# Patient Record
Sex: FEMALE | Race: WHITE | NOT HISPANIC OR LATINO | URBAN - METROPOLITAN AREA
[De-identification: names, ages, dates, MRNs, and addresses within clinical notes are randomized per-mention and may not be internally consistent; named-entity substitution may affect disease eponyms.]

---

## 2019-06-25 ENCOUNTER — EMERGENCY (EMERGENCY)
Facility: HOSPITAL | Age: 74
LOS: 1 days | Discharge: HOME | End: 2019-06-25
Attending: EMERGENCY MEDICINE | Admitting: EMERGENCY MEDICINE
Payer: MEDICARE

## 2019-06-25 VITALS
OXYGEN SATURATION: 98 % | SYSTOLIC BLOOD PRESSURE: 148 MMHG | TEMPERATURE: 99 F | HEIGHT: 66 IN | HEART RATE: 104 BPM | DIASTOLIC BLOOD PRESSURE: 90 MMHG | RESPIRATION RATE: 16 BRPM | WEIGHT: 134.92 LBS

## 2019-06-25 VITALS
DIASTOLIC BLOOD PRESSURE: 89 MMHG | HEART RATE: 86 BPM | OXYGEN SATURATION: 96 % | RESPIRATION RATE: 18 BRPM | SYSTOLIC BLOOD PRESSURE: 158 MMHG

## 2019-06-25 DIAGNOSIS — K56.41 FECAL IMPACTION: ICD-10-CM

## 2019-06-25 DIAGNOSIS — K62.89 OTHER SPECIFIED DISEASES OF ANUS AND RECTUM: ICD-10-CM

## 2019-06-25 PROCEDURE — 99283 EMERGENCY DEPT VISIT LOW MDM: CPT

## 2019-06-25 RX ORDER — ONDANSETRON 8 MG/1
8 TABLET, FILM COATED ORAL ONCE
Refills: 0 | Status: COMPLETED | OUTPATIENT
Start: 2019-06-25 | End: 2019-06-25

## 2019-06-25 RX ADMIN — Medication 1 ENEMA: at 20:39

## 2019-06-25 RX ADMIN — ONDANSETRON 8 MILLIGRAM(S): 8 TABLET, FILM COATED ORAL at 22:19

## 2019-06-25 NOTE — ED PROVIDER NOTE - CLINICAL SUMMARY MEDICAL DECISION MAKING FREE TEXT BOX
px with fecal impaction- manually disimpacted in the ED- then had several Large bm's- feeling mild quesy, but improved, stable for d/c home jackie faustin

## 2019-06-25 NOTE — ED PROVIDER NOTE - NSFOLLOWUPINSTRUCTIONS_ED_ALL_ED_FT
Fecal Impaction  Image   A fecal impaction is a large, firm amount of stool (feces) that will not pass out of the body. A fecal impaction usually occurs in the end of the large intestine (rectum). It can block the large intestine and cause significant problems.    What are the causes?  This condition may be caused by anything that slows down bowel movements, including:  Long-term use of medicines that help you have a bowel movement (laxatives).  Constipation.  Pain in the rectum. Fecal impaction can occur if you avoid having bowel movements due to the pain. Pain in the rectum can result from a medical condition, such as hemorrhoids or anal fissures.  Narcotic pain-relieving medicines, such as methadone, morphine, or codeine.  Not drinking enough fluids.  Being inactive for a long period of time.  Diseases of the brain or nervous system that damage nerves that control the muscles of the intestines.  What are the signs or symptoms?  Symptoms of this condition include:  Breathing problems.  Nausea, vomiting, and dehydration.  Dizziness.  Confusion.  Rapid heartbeat.  Fever.  Sweating.  Changes in blood pressure.  Not having a normal number of bowel movements.  Changes in bowel patterns. This may include going to the bathroom less often or not at all.  A sense of fullness in the rectum but being unable to pass stool.  Pain or cramps in the abdominal area. These often happen after meals.  Thin, watery discharge from the rectum.  How is this diagnosed?  This condition may be diagnosed based on your symptoms and an exam of your rectum. Sometimes X-rays or lab tests are done to confirm the diagnosis and to check for other problems.    How is this treated?  This condition may be treated by:  Having your health care provider remove the stool using a gloved finger.  Taking medicine.  A suppository or enema given in the rectum to soften the stool, which can stimulate a bowel movement.  Follow these instructions at home:  Eating and drinking     Image   Drink enough fluid to keep your urine clear or pale yellow.  Include a lot of fiber in your diet. Foods with a lot of fiber include fruits, vegetables, and oatmeal.  If you begin to get constipated, increase the amount of fiber in your diet.  General instructions     Develop bowel habits. An example of a bowel habit is having a bowel movement right after breakfast every day. Be sure to give yourself enough time on the toilet. This may require using enemas, bowel softeners, or suppositories at home, as directed by your health care provider. It may also include using mineral oil or olive oil.  Exercise regularly.  Take over-the-counter and prescription medicines only as told by your health care provider.  Contact a health care provider if:  You have ongoing pain in your rectum.  You need to use an enema or a suppository more than 2 times a week.  You have rectal bleeding.  You continue to have problems. The problems may include not being able to go to the bathroom and long-term (chronic) constipation.  You have pain in your abdomen.  You have thin, pencil-like stools.  Get help right away if:  You have black or tarry stools.  This information is not intended to replace advice given to you by your health care provider. Make sure you discuss any questions you have with your health care provider.    Document Released: 09/09/2005 Document Revised: 07/21/2017 Document Reviewed: 06/22/2017  [employer] Patient Education © 2019 [employer]

## 2019-06-25 NOTE — ED PROVIDER NOTE - OBJECTIVE STATEMENT
73 F co rectal pain- no sig bm x 5-6 days w abd distention  no n/v tolerating po normally no prior abd surgeries 73 F co rectal pain- no sig bm x 5-6 days w abd distention  no n/v tolerating po normally no prior abd surgeries  no abd pain no f/c  was partially disimpacted 2 days ago- no sig bm for 5-6 days  no exac/allev factors

## 2019-06-25 NOTE — ED ADULT NURSE NOTE - OBJECTIVE STATEMENT
Patient presents to the ED with no BM for 5 days.  Denies nausea, vomiting, diarrhea.  Patient states she feels pressure like she could go but she can't. Patient presents to the ED with no BM for 5 days.  Denies nausea, vomiting, diarrhea.  Patient states she feels pressure like she could go but she can't.  patient well appearing, AA&OX3, respirations unlabored, non-diaphoretic, no pallor. abdomen soft, non-tender.

## 2019-06-28 ENCOUNTER — EMERGENCY (EMERGENCY)
Facility: HOSPITAL | Age: 74
LOS: 1 days | Discharge: HOME | End: 2019-06-28
Attending: EMERGENCY MEDICINE | Admitting: EMERGENCY MEDICINE
Payer: MEDICARE

## 2019-06-28 VITALS
TEMPERATURE: 98 F | WEIGHT: 136.69 LBS | HEIGHT: 66 IN | DIASTOLIC BLOOD PRESSURE: 69 MMHG | SYSTOLIC BLOOD PRESSURE: 109 MMHG | HEART RATE: 118 BPM | RESPIRATION RATE: 18 BRPM | OXYGEN SATURATION: 99 %

## 2019-06-28 VITALS
RESPIRATION RATE: 18 BRPM | HEART RATE: 86 BPM | DIASTOLIC BLOOD PRESSURE: 88 MMHG | SYSTOLIC BLOOD PRESSURE: 145 MMHG | TEMPERATURE: 99 F | OXYGEN SATURATION: 99 %

## 2019-06-28 LAB
ALBUMIN SERPL ELPH-MCNC: 4.5 G/DL — SIGNIFICANT CHANGE UP (ref 3.3–5)
ALP SERPL-CCNC: 53 U/L — SIGNIFICANT CHANGE UP (ref 40–120)
ALT FLD-CCNC: 15 U/L — SIGNIFICANT CHANGE UP (ref 10–45)
ANION GAP SERPL CALC-SCNC: 12 MMOL/L — SIGNIFICANT CHANGE UP (ref 5–17)
AST SERPL-CCNC: 22 U/L — SIGNIFICANT CHANGE UP (ref 10–40)
BASOPHILS # BLD AUTO: 0.03 K/UL — SIGNIFICANT CHANGE UP (ref 0–0.2)
BASOPHILS NFR BLD AUTO: 0.4 % — SIGNIFICANT CHANGE UP (ref 0–2)
BILIRUB SERPL-MCNC: 0.4 MG/DL — SIGNIFICANT CHANGE UP (ref 0.2–1.2)
BUN SERPL-MCNC: 23 MG/DL — SIGNIFICANT CHANGE UP (ref 7–23)
CALCIUM SERPL-MCNC: 9.2 MG/DL — SIGNIFICANT CHANGE UP (ref 8.4–10.5)
CHLORIDE SERPL-SCNC: 101 MMOL/L — SIGNIFICANT CHANGE UP (ref 96–108)
CO2 SERPL-SCNC: 26 MMOL/L — SIGNIFICANT CHANGE UP (ref 22–31)
CREAT SERPL-MCNC: 1.02 MG/DL — SIGNIFICANT CHANGE UP (ref 0.5–1.3)
EOSINOPHIL # BLD AUTO: 0.09 K/UL — SIGNIFICANT CHANGE UP (ref 0–0.5)
EOSINOPHIL NFR BLD AUTO: 1.1 % — SIGNIFICANT CHANGE UP (ref 0–6)
GLUCOSE SERPL-MCNC: 110 MG/DL — HIGH (ref 70–99)
HCT VFR BLD CALC: 39.7 % — SIGNIFICANT CHANGE UP (ref 34.5–45)
HGB BLD-MCNC: 12.6 G/DL — SIGNIFICANT CHANGE UP (ref 11.5–15.5)
IMM GRANULOCYTES NFR BLD AUTO: 0.3 % — SIGNIFICANT CHANGE UP (ref 0–1.5)
LYMPHOCYTES # BLD AUTO: 1.98 K/UL — SIGNIFICANT CHANGE UP (ref 1–3.3)
LYMPHOCYTES # BLD AUTO: 25.1 % — SIGNIFICANT CHANGE UP (ref 13–44)
MCHC RBC-ENTMCNC: 27.7 PG — SIGNIFICANT CHANGE UP (ref 27–34)
MCHC RBC-ENTMCNC: 31.7 GM/DL — LOW (ref 32–36)
MCV RBC AUTO: 87.3 FL — SIGNIFICANT CHANGE UP (ref 80–100)
MONOCYTES # BLD AUTO: 0.76 K/UL — SIGNIFICANT CHANGE UP (ref 0–0.9)
MONOCYTES NFR BLD AUTO: 9.6 % — SIGNIFICANT CHANGE UP (ref 2–14)
NEUTROPHILS # BLD AUTO: 5 K/UL — SIGNIFICANT CHANGE UP (ref 1.8–7.4)
NEUTROPHILS NFR BLD AUTO: 63.5 % — SIGNIFICANT CHANGE UP (ref 43–77)
NRBC # BLD: 0 /100 WBCS — SIGNIFICANT CHANGE UP (ref 0–0)
PLATELET # BLD AUTO: 225 K/UL — SIGNIFICANT CHANGE UP (ref 150–400)
POTASSIUM SERPL-MCNC: 4.3 MMOL/L — SIGNIFICANT CHANGE UP (ref 3.5–5.3)
POTASSIUM SERPL-SCNC: 4.3 MMOL/L — SIGNIFICANT CHANGE UP (ref 3.5–5.3)
PROT SERPL-MCNC: 7.7 G/DL — SIGNIFICANT CHANGE UP (ref 6–8.3)
RBC # BLD: 4.55 M/UL — SIGNIFICANT CHANGE UP (ref 3.8–5.2)
RBC # FLD: 14 % — SIGNIFICANT CHANGE UP (ref 10.3–14.5)
SODIUM SERPL-SCNC: 139 MMOL/L — SIGNIFICANT CHANGE UP (ref 135–145)
WBC # BLD: 7.88 K/UL — SIGNIFICANT CHANGE UP (ref 3.8–10.5)
WBC # FLD AUTO: 7.88 K/UL — SIGNIFICANT CHANGE UP (ref 3.8–10.5)

## 2019-06-28 PROCEDURE — 74019 RADEX ABDOMEN 2 VIEWS: CPT | Mod: 26

## 2019-06-28 PROCEDURE — 99284 EMERGENCY DEPT VISIT MOD MDM: CPT

## 2019-06-28 NOTE — ED PROVIDER NOTE - SKIN NEGATIVE STATEMENT, MLM
no abrasions, no jaundice, no lesions, no pruritis, and no rashes.
Patient/Caregiver provided printed discharge information.

## 2019-06-28 NOTE — ED PROVIDER NOTE - NSFOLLOWUPINSTRUCTIONS_ED_ALL_ED_FT
Please follow up with your primary physician in 1-2 days for re evaluation.  Please return to ER immediately should your symptoms worsen or if you have any concern prior to this recommended follow up.          CONSTIPATION - AfterCare(R) Instructions(ER/ED)     Constipation    WHAT YOU NEED TO KNOW:    Constipation is when you have hard, dry bowel movements, or you go longer than usual between bowel movements.     DISCHARGE INSTRUCTIONS:    Call your doctor if:     You have blood in your bowel movements.      You have a fever and abdominal pain with the constipation.      Your constipation gets worse.       You start to vomit.      You have questions or concerns about your condition or care.    Medicines:     Medicine such as a laxative may help relax and loosen your intestines to help you have a bowel movement. Your provider may recommend you only use laxatives for a short time. Long-term use may make your bowels dependent on the medicine.      Take your medicine as directed. Contact your healthcare provider if you think your medicine is not helping or if you have side effects. Tell him of her if you are allergic to any medicine. Keep a list of the medicines, vitamins, and herbs you take. Include the amounts, and when and why you take them. Bring the list or the pill bottles to follow-up visits. Carry your medicine list with you in case of an emergency.    Relieve constipation:     A suppository may be used to help soften your bowel movements. This may make them easier to pass. A suppository is guided into your rectum through your anus.Suppository for Constipation           An enema is liquid medicine used to clear bowel movement from your rectum. The medicine is put into your rectum through your anus.Enemas         Prevent constipation:     Drink liquids as directed. You may need to drink extra liquids to help soften and move your bowels. Ask how much liquid to drink each day and which liquids are best for you.       Eat high-fiber foods. This may help decrease constipation by adding bulk to your bowel movements. High-fiber foods include fruit, vegetables, whole-grain breads and cereals, and beans. Your healthcare provider or dietitian can help you create a high-fiber meal plan. Your provider may also recommend a fiber supplement if you cannot get enough fiber from food.            Exercise regularly. Regular physical activity can help stimulate your intestines. Walking is a good exercise to prevent or relieve constipation. Ask which exercises are best for you.Walking for Exercise           Schedule a time each day to have a bowel movement. This may help train your body to have regular bowel movements. Bend forward while you are on the toilet to help move the bowel movement out. Sit on the toilet for at least 10 minutes, even if you do not have a bowel movement.       Talk to your healthcare provider about your medicines. Certain medicines, such as opioids, can cause constipation. Your provider may be able to make medicine changes. For example, he or she may change the kind of medicine, or change when you take it.    Follow up with your healthcare provider as directed: Write down your questions so you remember to ask them during your visits.        © Copyright [employer] 2019 All illustrations and images included in CareNotes are the copyrighted property of [employer] or [employer].      back to top                      © Copyright [employer] 2019

## 2019-06-28 NOTE — ED PROVIDER NOTE - OBJECTIVE STATEMENT
73 year old female presents to ED with concern for rectal pain and constipation, ongoing.  Patient notes she had a large bowel movements on her way to ED and is now feeling improved.  She had taken any enema prior to arrival in ED today.  She notes recent ED and PCP eval with disimpaction, though was still feeling constipated this morning.  Patient denies associated headache, fever, chills, nausea, emesis, or additional acute complaints or concerns at this time.

## 2019-06-28 NOTE — ED PROVIDER NOTE - CLINICAL SUMMARY MEDICAL DECISION MAKING FREE TEXT BOX
Patient in ED w concern for constipation over the past week - seen in ED and by her PCP with subsequent disimpactions.  Patient notes she was feeling constipated again today despite digital disimpaction by her PCP yesterday, so she took an enema and decided to come to ED for evaluation.  Patient notes en route to ED she had a large BM and is feeling improvement, though still wanted to be evaluated.  Patient is now abdominal pain free and well apearing.  Benign physical exam without reproducible abdominal discomfort.  Labs and imaging reviewed.  Several air fluid levels noted on abd x ray.  I spoke with radiologist in reading room who does not note dilated loops of bowel and is not worried for obstruction.  Given patient's benign exam and improved symptoms following BM, will plan for discharge and advise continued use of stool softener and prompt PCP follow up in 1-2 days for re evaluation.  Patient is instructed to return to ED immediately should symptoms worsen or if there is any concern prior to this recommended follow up.  Patient is aware of plan and verbalizes her understanding.  Will discharge home at this time.

## 2019-06-28 NOTE — ED ADULT NURSE NOTE - OBJECTIVE STATEMENT
The pt is a 74 y/o female who came in to ED for evaluation of rectal pain. Pt reports that she was here in ED two days ago and she was manually disimpacted. Pt reports taking an enema and that is "working now" Pt primary complaint is rectal pain and pressure.

## 2019-06-30 ENCOUNTER — INPATIENT (INPATIENT)
Facility: HOSPITAL | Age: 74
LOS: 1 days | Discharge: HOME | DRG: 389 | End: 2019-07-02
Attending: SPECIALIST | Admitting: SPECIALIST
Payer: MEDICARE

## 2019-06-30 VITALS
DIASTOLIC BLOOD PRESSURE: 98 MMHG | HEART RATE: 92 BPM | WEIGHT: 134.92 LBS | SYSTOLIC BLOOD PRESSURE: 159 MMHG | TEMPERATURE: 98 F | HEIGHT: 66 IN | OXYGEN SATURATION: 98 % | RESPIRATION RATE: 17 BRPM

## 2019-06-30 DIAGNOSIS — R63.8 OTHER SYMPTOMS AND SIGNS CONCERNING FOOD AND FLUID INTAKE: ICD-10-CM

## 2019-06-30 DIAGNOSIS — G20 PARKINSON'S DISEASE: ICD-10-CM

## 2019-06-30 DIAGNOSIS — K52.9 NONINFECTIVE GASTROENTERITIS AND COLITIS, UNSPECIFIED: ICD-10-CM

## 2019-06-30 DIAGNOSIS — K56.41 FECAL IMPACTION: ICD-10-CM

## 2019-06-30 DIAGNOSIS — R33.9 RETENTION OF URINE, UNSPECIFIED: ICD-10-CM

## 2019-06-30 DIAGNOSIS — Z29.9 ENCOUNTER FOR PROPHYLACTIC MEASURES, UNSPECIFIED: ICD-10-CM

## 2019-06-30 DIAGNOSIS — I10 ESSENTIAL (PRIMARY) HYPERTENSION: ICD-10-CM

## 2019-06-30 DIAGNOSIS — E78.5 HYPERLIPIDEMIA, UNSPECIFIED: ICD-10-CM

## 2019-06-30 DIAGNOSIS — Z91.89 OTHER SPECIFIED PERSONAL RISK FACTORS, NOT ELSEWHERE CLASSIFIED: ICD-10-CM

## 2019-06-30 LAB
ALBUMIN SERPL ELPH-MCNC: 4.2 G/DL — SIGNIFICANT CHANGE UP (ref 3.3–5)
ALP SERPL-CCNC: 56 U/L — SIGNIFICANT CHANGE UP (ref 40–120)
ALT FLD-CCNC: 14 U/L — SIGNIFICANT CHANGE UP (ref 10–45)
ANION GAP SERPL CALC-SCNC: 12 MMOL/L — SIGNIFICANT CHANGE UP (ref 5–17)
APPEARANCE UR: CLEAR — SIGNIFICANT CHANGE UP
AST SERPL-CCNC: 20 U/L — SIGNIFICANT CHANGE UP (ref 10–40)
BASOPHILS # BLD AUTO: 0.03 K/UL — SIGNIFICANT CHANGE UP (ref 0–0.2)
BASOPHILS NFR BLD AUTO: 0.4 % — SIGNIFICANT CHANGE UP (ref 0–2)
BILIRUB SERPL-MCNC: 0.5 MG/DL — SIGNIFICANT CHANGE UP (ref 0.2–1.2)
BILIRUB UR-MCNC: NEGATIVE — SIGNIFICANT CHANGE UP
BLD GP AB SCN SERPL QL: NEGATIVE — SIGNIFICANT CHANGE UP
BUN SERPL-MCNC: 22 MG/DL — SIGNIFICANT CHANGE UP (ref 7–23)
CALCIUM SERPL-MCNC: 9.5 MG/DL — SIGNIFICANT CHANGE UP (ref 8.4–10.5)
CHLORIDE SERPL-SCNC: 103 MMOL/L — SIGNIFICANT CHANGE UP (ref 96–108)
CO2 SERPL-SCNC: 25 MMOL/L — SIGNIFICANT CHANGE UP (ref 22–31)
COLOR SPEC: YELLOW — SIGNIFICANT CHANGE UP
CREAT SERPL-MCNC: 0.93 MG/DL — SIGNIFICANT CHANGE UP (ref 0.5–1.3)
DIFF PNL FLD: ABNORMAL
EOSINOPHIL # BLD AUTO: 0.06 K/UL — SIGNIFICANT CHANGE UP (ref 0–0.5)
EOSINOPHIL NFR BLD AUTO: 0.8 % — SIGNIFICANT CHANGE UP (ref 0–6)
GLUCOSE SERPL-MCNC: 105 MG/DL — HIGH (ref 70–99)
GLUCOSE UR QL: NEGATIVE — SIGNIFICANT CHANGE UP
HCT VFR BLD CALC: 41.2 % — SIGNIFICANT CHANGE UP (ref 34.5–45)
HGB BLD-MCNC: 13.2 G/DL — SIGNIFICANT CHANGE UP (ref 11.5–15.5)
IMM GRANULOCYTES NFR BLD AUTO: 0.3 % — SIGNIFICANT CHANGE UP (ref 0–1.5)
INR BLD: 1.15 — SIGNIFICANT CHANGE UP (ref 0.88–1.16)
KETONES UR-MCNC: 15 MG/DL
LACTATE SERPL-SCNC: 0.9 MMOL/L — SIGNIFICANT CHANGE UP (ref 0.5–2)
LEUKOCYTE ESTERASE UR-ACNC: NEGATIVE — SIGNIFICANT CHANGE UP
LYMPHOCYTES # BLD AUTO: 2.37 K/UL — SIGNIFICANT CHANGE UP (ref 1–3.3)
LYMPHOCYTES # BLD AUTO: 32.4 % — SIGNIFICANT CHANGE UP (ref 13–44)
MCHC RBC-ENTMCNC: 28.1 PG — SIGNIFICANT CHANGE UP (ref 27–34)
MCHC RBC-ENTMCNC: 32 GM/DL — SIGNIFICANT CHANGE UP (ref 32–36)
MCV RBC AUTO: 87.8 FL — SIGNIFICANT CHANGE UP (ref 80–100)
MONOCYTES # BLD AUTO: 0.7 K/UL — SIGNIFICANT CHANGE UP (ref 0–0.9)
MONOCYTES NFR BLD AUTO: 9.6 % — SIGNIFICANT CHANGE UP (ref 2–14)
NEUTROPHILS # BLD AUTO: 4.14 K/UL — SIGNIFICANT CHANGE UP (ref 1.8–7.4)
NEUTROPHILS NFR BLD AUTO: 56.5 % — SIGNIFICANT CHANGE UP (ref 43–77)
NITRITE UR-MCNC: NEGATIVE — SIGNIFICANT CHANGE UP
NRBC # BLD: 0 /100 WBCS — SIGNIFICANT CHANGE UP (ref 0–0)
PH UR: 6 — SIGNIFICANT CHANGE UP (ref 5–8)
PLATELET # BLD AUTO: 239 K/UL — SIGNIFICANT CHANGE UP (ref 150–400)
POTASSIUM SERPL-MCNC: 4.8 MMOL/L — SIGNIFICANT CHANGE UP (ref 3.5–5.3)
POTASSIUM SERPL-SCNC: 4.8 MMOL/L — SIGNIFICANT CHANGE UP (ref 3.5–5.3)
PROT SERPL-MCNC: 7.7 G/DL — SIGNIFICANT CHANGE UP (ref 6–8.3)
PROT UR-MCNC: NEGATIVE MG/DL — SIGNIFICANT CHANGE UP
PROTHROM AB SERPL-ACNC: 13.1 SEC — HIGH (ref 10–12.9)
RBC # BLD: 4.69 M/UL — SIGNIFICANT CHANGE UP (ref 3.8–5.2)
RBC # FLD: 13.7 % — SIGNIFICANT CHANGE UP (ref 10.3–14.5)
RH IG SCN BLD-IMP: POSITIVE — SIGNIFICANT CHANGE UP
SODIUM SERPL-SCNC: 140 MMOL/L — SIGNIFICANT CHANGE UP (ref 135–145)
SP GR SPEC: <=1.005 — SIGNIFICANT CHANGE UP (ref 1–1.03)
TSH SERPL-MCNC: 1.39 UIU/ML — SIGNIFICANT CHANGE UP (ref 0.35–4.94)
UROBILINOGEN FLD QL: 0.2 E.U./DL — SIGNIFICANT CHANGE UP
WBC # BLD: 7.32 K/UL — SIGNIFICANT CHANGE UP (ref 3.8–10.5)
WBC # FLD AUTO: 7.32 K/UL — SIGNIFICANT CHANGE UP (ref 3.8–10.5)

## 2019-06-30 PROCEDURE — 74177 CT ABD & PELVIS W/CONTRAST: CPT | Mod: 26

## 2019-06-30 PROCEDURE — 93010 ELECTROCARDIOGRAM REPORT: CPT | Mod: 76

## 2019-06-30 PROCEDURE — 99285 EMERGENCY DEPT VISIT HI MDM: CPT

## 2019-06-30 RX ORDER — ONDANSETRON 8 MG/1
4 TABLET, FILM COATED ORAL ONCE
Refills: 0 | Status: COMPLETED | OUTPATIENT
Start: 2019-06-30 | End: 2019-06-30

## 2019-06-30 RX ORDER — IOHEXOL 300 MG/ML
30 INJECTION, SOLUTION INTRAVENOUS ONCE
Refills: 0 | Status: COMPLETED | OUTPATIENT
Start: 2019-06-30 | End: 2019-06-30

## 2019-06-30 RX ORDER — SOD SULF/SODIUM/NAHCO3/KCL/PEG
4000 SOLUTION, RECONSTITUTED, ORAL ORAL ONCE
Refills: 0 | Status: DISCONTINUED | OUTPATIENT
Start: 2019-06-30 | End: 2019-06-30

## 2019-06-30 RX ORDER — AMLODIPINE BESYLATE 2.5 MG/1
2.5 TABLET ORAL ONCE
Refills: 0 | Status: DISCONTINUED | OUTPATIENT
Start: 2019-06-30 | End: 2019-06-30

## 2019-06-30 RX ORDER — SELEGILINE HYDROCHLORIDE 1.25 MG/1
5 TABLET, ORALLY DISINTEGRATING ORAL
Refills: 0 | Status: DISCONTINUED | OUTPATIENT
Start: 2019-06-30 | End: 2019-07-01

## 2019-06-30 RX ORDER — SODIUM CHLORIDE 9 MG/ML
1000 INJECTION INTRAMUSCULAR; INTRAVENOUS; SUBCUTANEOUS ONCE
Refills: 0 | Status: COMPLETED | OUTPATIENT
Start: 2019-06-30 | End: 2019-06-30

## 2019-06-30 RX ORDER — LACTULOSE 10 G/15ML
10 SOLUTION ORAL DAILY
Refills: 0 | Status: DISCONTINUED | OUTPATIENT
Start: 2019-06-30 | End: 2019-07-02

## 2019-06-30 RX ORDER — LISINOPRIL 2.5 MG/1
2.5 TABLET ORAL ONCE
Refills: 0 | Status: COMPLETED | OUTPATIENT
Start: 2019-06-30 | End: 2019-06-30

## 2019-06-30 RX ORDER — ATORVASTATIN CALCIUM 80 MG/1
10 TABLET, FILM COATED ORAL DAILY
Refills: 0 | Status: DISCONTINUED | OUTPATIENT
Start: 2019-06-30 | End: 2019-06-30

## 2019-06-30 RX ORDER — SOD SULF/SODIUM/NAHCO3/KCL/PEG
1000 SOLUTION, RECONSTITUTED, ORAL ORAL ONCE
Refills: 0 | Status: COMPLETED | OUTPATIENT
Start: 2019-06-30 | End: 2019-06-30

## 2019-06-30 RX ORDER — ATORVASTATIN CALCIUM 80 MG/1
10 TABLET, FILM COATED ORAL AT BEDTIME
Refills: 0 | Status: DISCONTINUED | OUTPATIENT
Start: 2019-06-30 | End: 2019-07-01

## 2019-06-30 RX ORDER — SELEGILINE HYDROCHLORIDE 1.25 MG/1
5 TABLET, ORALLY DISINTEGRATING ORAL
Refills: 0 | Status: DISCONTINUED | OUTPATIENT
Start: 2019-06-30 | End: 2019-06-30

## 2019-06-30 RX ORDER — LIDOCAINE 4 G/100G
1 CREAM TOPICAL ONCE
Refills: 0 | Status: COMPLETED | OUTPATIENT
Start: 2019-06-30 | End: 2019-06-30

## 2019-06-30 RX ADMIN — Medication 1000 MILLILITER(S): at 23:00

## 2019-06-30 RX ADMIN — ONDANSETRON 4 MILLIGRAM(S): 8 TABLET, FILM COATED ORAL at 11:17

## 2019-06-30 RX ADMIN — Medication 10 MILLIGRAM(S): at 19:06

## 2019-06-30 RX ADMIN — IOHEXOL 30 MILLILITER(S): 300 INJECTION, SOLUTION INTRAVENOUS at 11:00

## 2019-06-30 RX ADMIN — LIDOCAINE 1 APPLICATION(S): 4 CREAM TOPICAL at 13:51

## 2019-06-30 RX ADMIN — LISINOPRIL 2.5 MILLIGRAM(S): 2.5 TABLET ORAL at 19:05

## 2019-06-30 RX ADMIN — ONDANSETRON 4 MILLIGRAM(S): 8 TABLET, FILM COATED ORAL at 23:00

## 2019-06-30 RX ADMIN — SODIUM CHLORIDE 1000 MILLILITER(S): 9 INJECTION INTRAMUSCULAR; INTRAVENOUS; SUBCUTANEOUS at 11:15

## 2019-06-30 NOTE — ED PROVIDER NOTE - PHYSICAL EXAMINATION
CONSTITUTIONAL: WD,WN. NAD.    SKIN: Normal color and turgor. No rash.    HEAD: NC/AT.  EYES: Conjunctiva clear. EOMI. PERRL.    ENT: Airway patent, OP without erythema, tonsillar swelling or exudate; uvula midline without swelling. Nasal mucosa clear, no rhinorrhea.   RESPIRATORY:  Breathing non-labored. No retractions or accessory muscle use.  Lungs CTA bilat.  CARDIOVASCULAR:  RRR, S1S2. No M/R/G.      GI:  Abdomen moderately distended.  BS present, hypoactive.  No signficant tenderness.  Rectal: + fecal impaction, no visible blood.   MSK: Neck supple with painless ROM.  No lower extremity edema or calf tenderness.  No joint swelling or ROM limitation.  NEURO: Alert and oriented; CN II-XII grossly intact. Speech clear. 5/5 strength in all extremities.  Normal balance and gait.

## 2019-06-30 NOTE — ED PROVIDER NOTE - ATTENDING CONTRIBUTION TO CARE
72 yo f w hx of ongoing constipation presents to ED with concern for constipation.  She notes multiple recent evaluations and 2 subsequent disimpactions.  She has been taking her bowel regimen and despite this is not feeling improved.  She called Dr. Santacruz today and was instructed to come to ED for eval.  On my face to face ED eval, patient is non toxic in appearance.  HRRR, lungs clear.  Abd soft.  Will check CT and dispo accordingly.

## 2019-06-30 NOTE — H&P ADULT - PROBLEM SELECTOR PLAN 4
-Was given lisinopril 2.5mg PRN for  -Was given lisinopril 2.5mg one time dose /90  -Continue to monitor BP with scheduled vitals

## 2019-06-30 NOTE — ED ADULT TRIAGE NOTE - CHIEF COMPLAINT QUOTE
Patient complaining of constipation and rectal pain.  Patient states several visits this week to [facility] this week for same symptoms.  Patient states LBM was "a tiny piece yesterday."  Patient denies any CP, SOB, N/V or any other complaints at this time. Patient complaining of constipation and rectal pain.  Patient states several visits this week to [facility] for same symptoms.  Patient states LBM was "a tiny piece yesterday."  Patient denies any CP, SOB, N/V or any other complaints at this time.

## 2019-06-30 NOTE — ED PROVIDER NOTE - NS ED ROS FT
CONSTITUTIONAL: No fever, chills, or weakness  NEURO: No headache, no dizziness, no syncope; No focal weakness/tingling/numbness  EYES: No visual changes  ENT: No rhinorrhea or sore throat  PULM: No cough or dyspnea  CV: No chest pain or palpitations  GI: HPI  : HPI  MSK: No neck pain or back pain, no joint pain  SKIN: no rash or unusual bruising

## 2019-06-30 NOTE — H&P ADULT - NSHPSOCIALHISTORY_GEN_ALL_CORE
occasional etoh use  denies tobacco use  denies recreational drug use  lives in apartment by herself

## 2019-06-30 NOTE — ED PROVIDER NOTE - OBJECTIVE STATEMENT
72 yo fem PMHx parkinson's disease c/o rectal pain and abdominal bloating.  Constipated for over a week taking Miralax, colace, prune juice; has seen her PCP with digital rectal disimpaction in office, and had 2 ED visits in the past week before coming to ED today.  She had temporary relief after a second manual disimpaction in the ED a few days ago.  She spoke with Dr Santacruz covering for Dr Farrell, recommended return to ED for re-evaluation and CT scan.  No vomiting, fever/chills.  + urinary retention.  Last colonoscopy 2 yrs ago.

## 2019-06-30 NOTE — ED PROVIDER NOTE - PROGRESS NOTE DETAILS
d/w Dr Santacruz: he will admit for stercoral colitis; recommends trial of GoLytely, Linzess, or Cytotec.  Will have surgery consult as well.

## 2019-06-30 NOTE — H&P ADULT - PROBLEM SELECTOR PLAN 9
1) PCP Contacted on Admission: Dr. Robinson BARAHONA  2) Date of Contact with PCP:  3) PCP Contacted at Discharge:   4) Summary of Handoff Given to PCP:   5) Post-Discharge Appointment Date and Location:

## 2019-06-30 NOTE — H&P ADULT - PROBLEM SELECTOR PLAN 1
-generalized abdominal distension, active bowel sounds, non peritonitic, hemodynamically stable  -WBC 7.3, Hgb 13.2, lactate wnl  -CT A/P w oral and IV contrast noted, c/w stercoral colitis and severely distended urinary bladder  Plan:  -CBC, Coags, Type and Screen, Blood cx  -No surgical consultation at this time  -No empiric abx at this time  -NPO  -Serial abdominal exams  -Bowel regimen to include Genesis, Lactulose, Serial Enema -generalized abdominal distension, active bowel sounds, non peritonitic, hemodynamically stable  -WBC 7.3, Hgb 13.2, lactate wnl  -CT A/P w oral and IV contrast noted, c/w stercoral colitis and severely distended urinary bladder  Plan:  -CBC, Coags, Type and Screen, Blood cx  -AM labs (BMP, CBC, Mag)  -No surgical consultation at this time  -No empiric abx at this time  -NPO  -Serial abdominal exams  -Bowel regimen to include Genesis, Lactulose, Serial Enema -generalized abdominal distension, active bowel sounds, non peritonitic, hemodynamically stable  -WBC 7.3, Hgb 13.2, lactate wnl  -CT A/P w oral and IV contrast noted, c/w stercoral colitis and severely distended urinary bladder  Plan:  -CBC, Coags, Type and Screen, Blood cx  -AM labs (BMP, CBC, Mag)  -No surgical consultation at this time  -No empiric abx at this time  -NPO  -Zofran for nausea  -Bowel regimen to include Genesis, Lactulose, Serial Enema

## 2019-06-30 NOTE — H&P ADULT - PROBLEM SELECTOR PLAN 3
-STAT straight cath  -Bladder scan q6H and if PVR >300, consider straight cath again -Bladder scan showing PVR >900ml  -Will place silva and do trial of void tomorrow AM

## 2019-06-30 NOTE — H&P ADULT - NSHPPHYSICALEXAM_GEN_ALL_CORE
.  VITAL SIGNS:  T(C): 36.7 (06-30-19 @ 17:43), Max: 37.6 (06-30-19 @ 12:52)  T(F): 98 (06-30-19 @ 17:43), Max: 99.6 (06-30-19 @ 12:52)  HR: 89 (06-30-19 @ 17:43) (84 - 92)  BP: 160/96 (06-30-19 @ 17:43) (157/89 - 167/98)  BP(mean): --  RR: 16 (06-30-19 @ 17:43) (16 - 18)  SpO2: 99% (06-30-19 @ 17:43) (98% - 99%)  Wt(kg): --    PHYSICAL EXAM:    Constitutional: WDWN resting comfortably in bed; NAD  Head: NC/AT  Eyes: PERRL, EOMI  ENT: MMM  Neck: supple; no JVD or thyromegaly  Respiratory: CTA B/L; no W/R/R, no retractions  Cardiac: +S1/S2; RRR; no M/R/G  Gastrointestinal: moderate abdominal distension and slight discomfort to palpation in suprapubic region; no rebound, no guarding, normal bowel sounds  Back: no CVAT B/L  Extremities: WWP, no clubbing or cyanosis; no peripheral edema  Musculoskeletal: NROM x4; no joint swelling, tenderness or erythema  Vascular: 2+ radial, femoral, DP/PT pulses B/L  Dermatologic: skin warm, dry and intact; no rashes, wounds, or scars  Lymphatic: no submandibular or cervical LAD  Neurologic: AAOx3; CNII-XII grossly intact; no focal deficits  Psychiatric: affect and characteristics of appearance, verbalizations, behaviors are appropriate

## 2019-06-30 NOTE — H&P ADULT - PROBLEM SELECTOR PLAN 6
F: tolerating PO, no IVF  E: replete K<4, Mg<2  N: Dash/TLC    VTE Prophylaxis: Lovenox SubQ  C: Full Code  D: RMF c/w home lipitor

## 2019-06-30 NOTE — ED ADULT NURSE NOTE - OBJECTIVE STATEMENT
pt to ER w/ report of constipation and rectal pain.  Pt denies cp/sob/f/c/n/v.  Breathing unlabored, skin warm and dry. IV access established, labs drawn and sent. Will continue to monitor.

## 2019-06-30 NOTE — ED PROVIDER NOTE - CLINICAL SUMMARY MEDICAL DECISION MAKING FREE TEXT BOX
Patient with constipation not relieved by bowel regimen at home and multiple digital disimpactions, and associated urinary retention.  Third digital disimpaction done in ED, given tap water enema, and CT scan with oral contrast done without success. CT scan showing stercoral colitis, will admit.

## 2019-06-30 NOTE — ED ADULT NURSE NOTE - CHIEF COMPLAINT QUOTE
Patient complaining of constipation and rectal pain.  Patient states several visits this week to [facility] for same symptoms.  Patient states LBM was "a tiny piece yesterday."  Patient denies any CP, SOB, N/V or any other complaints at this time.

## 2019-06-30 NOTE — H&P ADULT - NSHPLABSRESULTS_GEN_ALL_CORE
13.2   7.32  )-----------( 239      ( 30 Jun 2019 11:13 )             41.2       LIVER FUNCTIONS - ( 30 Jun 2019 11:13 )  Alb: 4.2 g/dL / Pro: 7.7 g/dL / ALK PHOS: 56 U/L / ALT: 14 U/L / AST: 20 U/L / GGT: x             All imaging reviewed. .  LABS:                         13.2   7.32  )-----------( 239      ( 30 Jun 2019 11:13 )             41.2     06-30    140  |  103  |  22  ----------------------------<  105<H>  4.8   |  25  |  0.93    Ca    9.5      30 Jun 2019 11:13    TPro  7.7  /  Alb  4.2  /  TBili  0.5  /  DBili  x   /  AST  20  /  ALT  14  /  AlkPhos  56  06-30      Urinalysis Basic - ( 30 Jun 2019 15:52 )    Color: Yellow / Appearance: Clear / SG: <=1.005 / pH: x  Gluc: x / Ketone: 15 mg/dL  / Bili: Negative / Urobili: 0.2 E.U./dL   Blood: x / Protein: NEGATIVE mg/dL / Nitrite: NEGATIVE   Leuk Esterase: NEGATIVE / RBC: < 5 /HPF / WBC < 5 /HPF   Sq Epi: x / Non Sq Epi: 0-5 /HPF / Bacteria: Present /HPF            Lactate, Blood: 0.9 mmoL/L (06-30 @ 18:17)      RADIOLOGY, EKG & ADDITIONAL TESTS: Reviewed.

## 2019-07-01 LAB
ANION GAP SERPL CALC-SCNC: 11 MMOL/L — SIGNIFICANT CHANGE UP (ref 5–17)
BUN SERPL-MCNC: 19 MG/DL — SIGNIFICANT CHANGE UP (ref 7–23)
CALCIUM SERPL-MCNC: 8.6 MG/DL — SIGNIFICANT CHANGE UP (ref 8.4–10.5)
CHLORIDE SERPL-SCNC: 101 MMOL/L — SIGNIFICANT CHANGE UP (ref 96–108)
CO2 SERPL-SCNC: 25 MMOL/L — SIGNIFICANT CHANGE UP (ref 22–31)
CREAT SERPL-MCNC: 0.94 MG/DL — SIGNIFICANT CHANGE UP (ref 0.5–1.3)
GLUCOSE SERPL-MCNC: 128 MG/DL — HIGH (ref 70–99)
HCT VFR BLD CALC: 35.9 % — SIGNIFICANT CHANGE UP (ref 34.5–45)
HCV AB S/CO SERPL IA: 0.17 S/CO — SIGNIFICANT CHANGE UP
HCV AB SERPL-IMP: SIGNIFICANT CHANGE UP
HGB BLD-MCNC: 11.7 G/DL — SIGNIFICANT CHANGE UP (ref 11.5–15.5)
MAGNESIUM SERPL-MCNC: 2 MG/DL — SIGNIFICANT CHANGE UP (ref 1.6–2.6)
MCHC RBC-ENTMCNC: 27.9 PG — SIGNIFICANT CHANGE UP (ref 27–34)
MCHC RBC-ENTMCNC: 32.6 GM/DL — SIGNIFICANT CHANGE UP (ref 32–36)
MCV RBC AUTO: 85.5 FL — SIGNIFICANT CHANGE UP (ref 80–100)
NRBC # BLD: 0 /100 WBCS — SIGNIFICANT CHANGE UP (ref 0–0)
PLATELET # BLD AUTO: 225 K/UL — SIGNIFICANT CHANGE UP (ref 150–400)
POTASSIUM SERPL-MCNC: 3.8 MMOL/L — SIGNIFICANT CHANGE UP (ref 3.5–5.3)
POTASSIUM SERPL-SCNC: 3.8 MMOL/L — SIGNIFICANT CHANGE UP (ref 3.5–5.3)
RBC # BLD: 4.2 M/UL — SIGNIFICANT CHANGE UP (ref 3.8–5.2)
RBC # FLD: 13.6 % — SIGNIFICANT CHANGE UP (ref 10.3–14.5)
SODIUM SERPL-SCNC: 137 MMOL/L — SIGNIFICANT CHANGE UP (ref 135–145)
WBC # BLD: 7.94 K/UL — SIGNIFICANT CHANGE UP (ref 3.8–10.5)
WBC # FLD AUTO: 7.94 K/UL — SIGNIFICANT CHANGE UP (ref 3.8–10.5)

## 2019-07-01 RX ORDER — MINERAL OIL
133 OIL (ML) MISCELLANEOUS ONCE
Refills: 0 | Status: COMPLETED | OUTPATIENT
Start: 2019-07-01 | End: 2019-07-01

## 2019-07-01 RX ORDER — SELEGILINE HYDROCHLORIDE 1.25 MG/1
10 TABLET, ORALLY DISINTEGRATING ORAL
Refills: 0 | Status: DISCONTINUED | OUTPATIENT
Start: 2019-07-02 | End: 2019-07-02

## 2019-07-01 RX ORDER — ATORVASTATIN CALCIUM 80 MG/1
1 TABLET, FILM COATED ORAL
Qty: 0 | Refills: 0 | DISCHARGE

## 2019-07-01 RX ORDER — IBUPROFEN 200 MG
200 TABLET ORAL ONCE
Refills: 0 | Status: COMPLETED | OUTPATIENT
Start: 2019-07-01 | End: 2019-07-01

## 2019-07-01 RX ORDER — SELEGILINE HYDROCHLORIDE 1.25 MG/1
10 TABLET, ORALLY DISINTEGRATING ORAL ONCE
Refills: 0 | Status: COMPLETED | OUTPATIENT
Start: 2019-07-01 | End: 2019-07-01

## 2019-07-01 RX ORDER — ATORVASTATIN CALCIUM 80 MG/1
10 TABLET, FILM COATED ORAL DAILY
Refills: 0 | Status: DISCONTINUED | OUTPATIENT
Start: 2019-07-01 | End: 2019-07-02

## 2019-07-01 RX ORDER — LIDOCAINE 4 G/100G
1 CREAM TOPICAL ONCE
Refills: 0 | Status: COMPLETED | OUTPATIENT
Start: 2019-07-01 | End: 2019-07-01

## 2019-07-01 RX ORDER — SELEGILINE HYDROCHLORIDE 1.25 MG/1
1 TABLET, ORALLY DISINTEGRATING ORAL
Qty: 0 | Refills: 0 | DISCHARGE

## 2019-07-01 RX ORDER — ONDANSETRON 8 MG/1
4 TABLET, FILM COATED ORAL ONCE
Refills: 0 | Status: COMPLETED | OUTPATIENT
Start: 2019-07-01 | End: 2019-07-01

## 2019-07-01 RX ADMIN — ONDANSETRON 4 MILLIGRAM(S): 8 TABLET, FILM COATED ORAL at 10:19

## 2019-07-01 RX ADMIN — LIDOCAINE 1 APPLICATION(S): 4 CREAM TOPICAL at 06:44

## 2019-07-01 RX ADMIN — Medication 200 MILLIGRAM(S): at 13:30

## 2019-07-01 RX ADMIN — Medication 200 MILLIGRAM(S): at 12:54

## 2019-07-01 RX ADMIN — SELEGILINE HYDROCHLORIDE 10 MILLIGRAM(S): 1.25 TABLET, ORALLY DISINTEGRATING ORAL at 10:04

## 2019-07-01 RX ADMIN — LACTULOSE 10 GRAM(S): 10 SOLUTION ORAL at 11:56

## 2019-07-01 RX ADMIN — Medication 133 MILLILITER(S): at 10:04

## 2019-07-01 RX ADMIN — ATORVASTATIN CALCIUM 10 MILLIGRAM(S): 80 TABLET, FILM COATED ORAL at 12:00

## 2019-07-01 NOTE — PROGRESS NOTE ADULT - SUBJECTIVE AND OBJECTIVE BOX
coverage  Bud    Pt is a 73F with a history of Parkinson's disease, constipation, presenting for worsening abdominal bloating and rectal pain. Patient has reported approximately 1.5 weeks of abdominal bloating and constipation. She went into her PCP (Dr Bowers) a week ago and underwent disimpaction. Has also had 2 ED visits in the last week, most recently 3 days ago with KUB showing nonspecific gas pattern. For past two days she has had persistent generalized abdominal distension, bloating, and rectal pain (which she attributes to manipulation from disimpaction). Patient has tried miralax, colace, and prunes with no relief of symptoms. Also complains of urinary retention for the past two days but states that in the ED she was able to produce some urine. Endorses mild nausea but no vomiting, fevers, chills, chest pain, SOB, dysuria.   In the ED she wasmildly hypertensive with otherwise stable VS. Labs notable for normal WBC count, LFTs, lytes and creatinine. She underwent attempted manual disimpaction. She had a CT A/P which showed significant stool burden, stercoral colitis, and distended urinary bladder. She received 1L NS and zofran. (30 Jun 2019 15:44)      REVIEW OF SYSTEMS:  Constitutional: No fever, weight loss or fatigue  ENMT:  No difficulty hearing, tinnitus, vertigo; No sinus or throat pain  Respiratory: No cough, wheezing, chills or hemoptysis  Cardiovascular: No chest pain, palpitations, dizziness or leg swelling  Gastrointestinal: No abdominal or epigastric pain. No nausea, vomiting or hematemesis; no BM despite taking half of golytely  Skin: No itching, burning, rashes or lesions   Musculoskeletal: No joint pain or swelling; No muscle, back or extremity pain    PAST MEDICAL & SURGICAL HISTORY:  Hyperlipidemia  Parkinson disease  No significant past surgical history      FAMILY HISTORY:  No pertinent family history in first degree relatives      SOCIAL HISTORY:  Smoking Status: [ ] Current, [ ] Former, [ ] Never  Pack Years:    MEDICATIONS:  MEDICATIONS  (STANDING):  atorvastatin 10 milliGRAM(s) Oral at bedtime  lactulose Syrup 10 Gram(s) Oral daily    MEDICATIONS  (PRN):  ondansetron   Disintegrating Tablet 4 milliGRAM(s) Oral Once PRN Nausea      Allergies    No Known Allergies    Intolerances        Vital Signs Last 24 Hrs  T(C): 36.7 (01 Jul 2019 05:25), Max: 37.6 (30 Jun 2019 12:52)  T(F): 98.1 (01 Jul 2019 05:25), Max: 99.6 (30 Jun 2019 12:52)  HR: 97 (01 Jul 2019 05:25) (84 - 97)  BP: 127/76 (01 Jul 2019 05:25) (127/76 - 183/95)  BP(mean): --  RR: 15 (01 Jul 2019 05:25) (15 - 18)  SpO2: 96% (01 Jul 2019 05:25) (96% - 99%)    06-30 @ 07:01  -  07-01 @ 07:00  --------------------------------------------------------  IN: 0 mL / OUT: 2400 mL / NET: -2400 mL          PHYSICAL EXAM:    General: Well developed; well nourished; in no acute distress  HEENT: MMM, conjunctiva and sclera clear  Lungs: clear  Heart: regular  Gastrointestinal: Soft, non-tender non-distended; Normal bowel sounds; No rebound or guarding, silva in  Extremities: Normal range of motion, No clubbing, cyanosis or edema  Neurological: Alert and oriented x3  Skin: Warm and dry. No obvious rash      LABS:                        13.2   7.32  )-----------( 239      ( 30 Jun 2019 11:13 )             41.2     06-30    140  |  103  |  22  ----------------------------<  105<H>  4.8   |  25  |  0.93    Ca    9.5      30 Jun 2019 11:13    TPro  7.7  /  Alb  4.2  /  TBili  0.5  /  DBili  x   /  AST  20  /  ALT  14  /  AlkPhos  56  06-30          RADIOLOGY & ADDITIONAL STUDIES:

## 2019-07-01 NOTE — PROGRESS NOTE ADULT - ASSESSMENT
73F with Parkinsons disease and constipation admitted with stercoralcolitis **************** 73F with Parkinsons disease and constipation admitted with stercoralcolitis  currently being monitored and treated with Genesis and awaiting resolution of constipation.

## 2019-07-01 NOTE — PHYSICAL THERAPY INITIAL EVALUATION ADULT - GENERAL OBSERVATIONS, REHAB EVAL
Patient received semi-supine no acute distress +silva, cleared for PT by MATEUS Nguyễn, agreeable to PT Eval. Left as found +call joey, MATEUS Nguyễn aware. FIM 7

## 2019-07-01 NOTE — CONSULT NOTE ADULT - SUBJECTIVE AND OBJECTIVE BOX
CONSULT NOTE:     Patient is a 73y old  Female who presents with a chief complaint of colitis (01 Jul 2019 18:26)      HPI:  Pt is a 73F with a history of Parkinson's disease, constipation, presenting for worsening abdominal bloating and rectal pain. Patient has reported approximately 1.5 weeks of abdominal bloating and constipation. She went into her PCP (Dr Bowers) a week ago and underwent disimpaction. Has also had 2 ED visits in the last week, most recently 3 days ago with KUB showing nonspecific gas pattern. For past two days she has had persistent generalized abdominal distension, bloating, and rectal pain (which she attributes to manipulation from disimpaction). Patient has tried miralax, colace, and prunes with no relief of symptoms. Also complains of urinary retention for the past two days but states that in the ED she was able to produce some urine. Endorses mild nausea but no vomiting, fevers, chills, chest pain, SOB, dysuria.       Urology called for urinary retention. Patient reports having new onset of urinary retention started 5-7 days ago with constipation. She states she feels full due to the constipation. She was able to void small amounts last week but felt as though was not emptying bladder. She has never seen a urologist and reports never having retention in the past.+increased frequency.  Denies hematuria, dysuria, pyuria, urgency, flank pain, fatigue.       Vital Signs Last 24 Hrs  T(C): 36.8 (01 Jul 2019 12:23), Max: 36.8 (01 Jul 2019 12:23)  T(F): 98.2 (01 Jul 2019 12:23), Max: 98.2 (01 Jul 2019 12:23)  HR: 87 (01 Jul 2019 12:23) (87 - 97)  BP: 151/85 (01 Jul 2019 12:23) (127/76 - 183/95)  BP(mean): --  RR: 16 (01 Jul 2019 12:23) (15 - 16)  SpO2: 97% (01 Jul 2019 12:23) (96% - 98%)  I&O's Summary    30 Jun 2019 07:01  -  01 Jul 2019 07:00  --------------------------------------------------------  IN: 0 mL / OUT: 2400 mL / NET: -2400 mL        PE:  Gen:  Abd:  :  VICKI:    LABS:                        11.7   7.94  )-----------( 225      ( 01 Jul 2019 11:09 )             35.9     07-01    137  |  101  |  19  ----------------------------<  128<H>  3.8   |  25  |  0.94    Ca    8.6      01 Jul 2019 11:09  Mg     2.0     07-01    TPro  7.7  /  Alb  4.2  /  TBili  0.5  /  DBili  x   /  AST  20  /  ALT  14  /  AlkPhos  56  06-30    PT/INR - ( 30 Jun 2019 18:17 )   PT: 13.1 sec;   INR: 1.15            Cultures      A/P: Pt is a 73F with a history of Parkinson's disease, constipation, presenting for worsening abdominal bloating and rectal pain. Patient has reported approximately 1.5 weeks of abdominal bloating and constipation. she also presented c/o urinary retention associating start of symptoms with constipation. UA negative.   -Silva for approximately 1 week, until constipation resolved.  -Can f/u outpatient for silva catheter removal and TOV  -discussed with Dr. Kulkarni

## 2019-07-01 NOTE — PROGRESS NOTE ADULT - PROBLEM SELECTOR PLAN 3
-Bladder scan showing PVR >900ml  -Will place silva and do trial of void tomorrow AM -Holloway was placed last night and drained _______ml -Silva was placed last night and drained 2400ml  -Patient no longer retaining urine and silva was discontinued -Holloway was placed last night and drained 2400ml  -Need to reevaluate whether patient still needs Holloway  -Appreciate renal recs

## 2019-07-01 NOTE — PHYSICAL THERAPY INITIAL EVALUATION ADULT - PERTINENT HX OF CURRENT PROBLEM, REHAB EVAL
Pt is a 73F with a history of Parkinson's disease, constipation, presenting for worsening abdominal bloating and rectal pain. Patient has reported approximately 1.5 weeks of abdominal bloating and constipation. She went into her PCP (Dr Bowers) a week ago and underwent disimpaction. Has also had 2 ED visits in the last week, most recently 3 days ago with KUB showing nonspecific gas pattern. For past two days she has had persistent generalized abdominal distension, bloating, and rectal pain

## 2019-07-01 NOTE — PROGRESS NOTE ADULT - SUBJECTIVE AND OBJECTIVE BOX
pt is well known to me.  when she goes home, she will see me next week.  obvious palpable hard material in LLQ with tympanitic abdomen above.  Await results of flex sig.

## 2019-07-01 NOTE — PROGRESS NOTE ADULT - SUBJECTIVE AND OBJECTIVE BOX
INCOMPLETE**********      OVERNIGHT EVENTS: PRAVEEN    INTERVAL EVENTS: Pt. reports small amounts of liquid bowel movement. Denies pain, or discomfort, but does report nausea with Go-lightly    VITAL SIGNS:  Vital Signs Last 24 Hrs  T(C): 36.7 (01 Jul 2019 05:25), Max: 37.6 (30 Jun 2019 12:52)  T(F): 98.1 (01 Jul 2019 05:25), Max: 99.6 (30 Jun 2019 12:52)  HR: 97 (01 Jul 2019 05:25) (84 - 97)  BP: 127/76 (01 Jul 2019 05:25) (127/76 - 183/95)  BP(mean): --  RR: 15 (01 Jul 2019 05:25) (15 - 18)  SpO2: 96% (01 Jul 2019 05:25) (96% - 99%)    PHYSICAL EXAM:    General: in NAD, sitting up in bed  HEENT: normocephalic, atraumatic; PERRL, anicteric sclera; MMM  Neck: supple, no JVD, no thyromegaly, no lymphadenopathy  Cardiovascular: +S1/S2, RRR, no M/G/R  Respiratory: clear to auscultation B/L; no wheezing, no rales, no rhonchi  Gastrointestinal: soft, mildly distended abdomen; +BSx4, no organomegaly  Extremities: WWP; no edema, clubbing or cyanosis  Vascular: 2+ radial, DP/PT pulses B/L  Neurological: AAOx3; mild intention tremor on L hand at baseline; cranial nerves grossly intact    MEDICATIONS:  MEDICATIONS  (STANDING):  atorvastatin 10 milliGRAM(s) Oral at bedtime  lactulose Syrup 10 Gram(s) Oral daily  selegiline Oral Tab/Cap 5 milliGRAM(s) Oral two times a day    MEDICATIONS  (PRN):  ondansetron   Disintegrating Tablet 4 milliGRAM(s) Oral Once PRN Nausea      ALLERGIES:  Allergies    No Known Allergies    Intolerances        LABS:                        13.2   7.32  )-----------( 239      ( 30 Jun 2019 11:13 )             41.2     06-30    140  |  103  |  22  ----------------------------<  105<H>  4.8   |  25  |  0.93    Ca    9.5      30 Jun 2019 11:13    TPro  7.7  /  Alb  4.2  /  TBili  0.5  /  DBili  x   /  AST  20  /  ALT  14  /  AlkPhos  56  06-30    PT/INR - ( 30 Jun 2019 18:17 )   PT: 13.1 sec;   INR: 1.15            Urinalysis Basic - ( 30 Jun 2019 15:52 )    Color: Yellow / Appearance: Clear / SG: <=1.005 / pH: x  Gluc: x / Ketone: 15 mg/dL  / Bili: Negative / Urobili: 0.2 E.U./dL   Blood: x / Protein: NEGATIVE mg/dL / Nitrite: NEGATIVE   Leuk Esterase: NEGATIVE / RBC: < 5 /HPF / WBC < 5 /HPF   Sq Epi: x / Non Sq Epi: 0-5 /HPF / Bacteria: Present /HPF      CAPILLARY BLOOD GLUCOSE          RADIOLOGY & ADDITIONAL TESTS: Reviewed. OVERNIGHT EVENTS: PRAVEEN    INTERVAL EVENTS: Pt. reports small amounts of liquid bowel movement. Denies pain, or discomfort, but does report nausea with Genesis    VITAL SIGNS:  Vital Signs Last 24 Hrs  T(C): 36.7 (01 Jul 2019 05:25), Max: 37.6 (30 Jun 2019 12:52)  T(F): 98.1 (01 Jul 2019 05:25), Max: 99.6 (30 Jun 2019 12:52)  HR: 97 (01 Jul 2019 05:25) (84 - 97)  BP: 127/76 (01 Jul 2019 05:25) (127/76 - 183/95)  BP(mean): --  RR: 15 (01 Jul 2019 05:25) (15 - 18)  SpO2: 96% (01 Jul 2019 05:25) (96% - 99%)    PHYSICAL EXAM:    General: in NAD, sitting up in bed  HEENT: normocephalic, atraumatic; PERRL, anicteric sclera; MMM  Neck: supple, no JVD, no thyromegaly, no lymphadenopathy  Cardiovascular: +S1/S2, RRR, no M/G/R  Respiratory: clear to auscultation B/L; no wheezing, no rales, no rhonchi  Gastrointestinal: soft, mildly distended abdomen; +BSx4, no organomegaly  Extremities: WWP; no edema, clubbing or cyanosis  Vascular: 2+ radial, DP/PT pulses B/L  Neurological: AAOx3; mild intention tremor on L hand at baseline; cranial nerves grossly intact    MEDICATIONS:  MEDICATIONS  (STANDING):  atorvastatin 10 milliGRAM(s) Oral at bedtime  lactulose Syrup 10 Gram(s) Oral daily  selegiline Oral Tab/Cap 5 milliGRAM(s) Oral two times a day    MEDICATIONS  (PRN):  ondansetron   Disintegrating Tablet 4 milliGRAM(s) Oral Once PRN Nausea      ALLERGIES:  Allergies    No Known Allergies    Intolerances        LABS:                        13.2   7.32  )-----------( 239      ( 30 Jun 2019 11:13 )             41.2     06-30    140  |  103  |  22  ----------------------------<  105<H>  4.8   |  25  |  0.93    Ca    9.5      30 Jun 2019 11:13    TPro  7.7  /  Alb  4.2  /  TBili  0.5  /  DBili  x   /  AST  20  /  ALT  14  /  AlkPhos  56  06-30    PT/INR - ( 30 Jun 2019 18:17 )   PT: 13.1 sec;   INR: 1.15            Urinalysis Basic - ( 30 Jun 2019 15:52 )    Color: Yellow / Appearance: Clear / SG: <=1.005 / pH: x  Gluc: x / Ketone: 15 mg/dL  / Bili: Negative / Urobili: 0.2 E.U./dL   Blood: x / Protein: NEGATIVE mg/dL / Nitrite: NEGATIVE   Leuk Esterase: NEGATIVE / RBC: < 5 /HPF / WBC < 5 /HPF   Sq Epi: x / Non Sq Epi: 0-5 /HPF / Bacteria: Present /HPF      CAPILLARY BLOOD GLUCOSE          RADIOLOGY & ADDITIONAL TESTS: Reviewed.

## 2019-07-01 NOTE — PHYSICAL THERAPY INITIAL EVALUATION ADULT - DISCHARGE DISPOSITION, PT EVAL
patient demonstrates independence with all basic mobility tasks and is cleared by PT, safe for d/c home at this time. FIM 7/no skilled PT needs/home

## 2019-07-02 ENCOUNTER — TRANSCRIPTION ENCOUNTER (OUTPATIENT)
Age: 74
End: 2019-07-02

## 2019-07-02 VITALS
RESPIRATION RATE: 16 BRPM | HEART RATE: 90 BPM | TEMPERATURE: 98 F | DIASTOLIC BLOOD PRESSURE: 94 MMHG | OXYGEN SATURATION: 99 % | SYSTOLIC BLOOD PRESSURE: 156 MMHG

## 2019-07-02 DIAGNOSIS — K62.89 OTHER SPECIFIED DISEASES OF ANUS AND RECTUM: ICD-10-CM

## 2019-07-02 DIAGNOSIS — K59.01 SLOW TRANSIT CONSTIPATION: ICD-10-CM

## 2019-07-02 PROCEDURE — 81001 URINALYSIS AUTO W/SCOPE: CPT

## 2019-07-02 PROCEDURE — 84443 ASSAY THYROID STIM HORMONE: CPT

## 2019-07-02 PROCEDURE — 86850 RBC ANTIBODY SCREEN: CPT

## 2019-07-02 PROCEDURE — 83605 ASSAY OF LACTIC ACID: CPT

## 2019-07-02 PROCEDURE — 74019 RADEX ABDOMEN 2 VIEWS: CPT

## 2019-07-02 PROCEDURE — 74177 CT ABD & PELVIS W/CONTRAST: CPT

## 2019-07-02 PROCEDURE — 93005 ELECTROCARDIOGRAM TRACING: CPT

## 2019-07-02 PROCEDURE — 86803 HEPATITIS C AB TEST: CPT

## 2019-07-02 PROCEDURE — 85027 COMPLETE CBC AUTOMATED: CPT

## 2019-07-02 PROCEDURE — 86901 BLOOD TYPING SEROLOGIC RH(D): CPT

## 2019-07-02 PROCEDURE — 97161 PT EVAL LOW COMPLEX 20 MIN: CPT

## 2019-07-02 PROCEDURE — 80048 BASIC METABOLIC PNL TOTAL CA: CPT

## 2019-07-02 PROCEDURE — 83735 ASSAY OF MAGNESIUM: CPT

## 2019-07-02 PROCEDURE — 80053 COMPREHEN METABOLIC PANEL: CPT

## 2019-07-02 PROCEDURE — 36415 COLL VENOUS BLD VENIPUNCTURE: CPT

## 2019-07-02 PROCEDURE — 85025 COMPLETE CBC W/AUTO DIFF WBC: CPT

## 2019-07-02 PROCEDURE — 86900 BLOOD TYPING SEROLOGIC ABO: CPT

## 2019-07-02 PROCEDURE — 99285 EMERGENCY DEPT VISIT HI MDM: CPT

## 2019-07-02 PROCEDURE — 85610 PROTHROMBIN TIME: CPT

## 2019-07-02 RX ORDER — LACTULOSE 10 G/15ML
15 SOLUTION ORAL
Qty: 300 | Refills: 0
Start: 2019-07-02 | End: 2019-07-11

## 2019-07-02 RX ORDER — LACTULOSE 10 G/15ML
15 SOLUTION ORAL
Qty: 900 | Refills: 0
Start: 2019-07-02 | End: 2019-07-31

## 2019-07-02 RX ORDER — LACTULOSE 10 G/15ML
10 SOLUTION ORAL
Refills: 0 | Status: DISCONTINUED | OUTPATIENT
Start: 2019-07-02 | End: 2019-07-02

## 2019-07-02 RX ORDER — MISOPROSTOL 200 UG/1
1 TABLET ORAL
Qty: 120 | Refills: 0
Start: 2019-07-02 | End: 2019-07-31

## 2019-07-02 RX ORDER — POTASSIUM CHLORIDE 20 MEQ
20 PACKET (EA) ORAL ONCE
Refills: 0 | Status: COMPLETED | OUTPATIENT
Start: 2019-07-02 | End: 2019-07-02

## 2019-07-02 RX ORDER — MISOPROSTOL 200 UG/1
1 TABLET ORAL
Qty: 40 | Refills: 0
Start: 2019-07-02 | End: 2019-07-11

## 2019-07-02 RX ADMIN — Medication 20 MILLIEQUIVALENT(S): at 12:03

## 2019-07-02 RX ADMIN — LACTULOSE 10 GRAM(S): 10 SOLUTION ORAL at 12:02

## 2019-07-02 RX ADMIN — SELEGILINE HYDROCHLORIDE 10 MILLIGRAM(S): 1.25 TABLET, ORALLY DISINTEGRATING ORAL at 09:19

## 2019-07-02 RX ADMIN — ATORVASTATIN CALCIUM 10 MILLIGRAM(S): 80 TABLET, FILM COATED ORAL at 12:03

## 2019-07-02 NOTE — DISCHARGE NOTE PROVIDER - CARE PROVIDERS DIRECT ADDRESSES
,DirectAddress_Unknown ,DirectAddress_Unknown,DirectAddress_Unknown ,DirectAddress_Unknown,DirectAddress_Unknown,[email or web address]

## 2019-07-02 NOTE — PROGRESS NOTE ADULT - ASSESSMENT
flex sig = stercoral ulcer  stool impaction  I manually disimpacted more stools   PlaN: discharge planning: misoprostol 100 mg QID, Lactulose 30 ml BID may increase prn, at least 6 x 8 ounces of water per day  d/c home with silva and follow up with , please ask nurse to do leg bag and teaching

## 2019-07-02 NOTE — DISCHARGE NOTE NURSING/CASE MANAGEMENT/SOCIAL WORK - NSDCDPATPORTLINK_GEN_ALL_CORE
You can access the [facility] Portal, offered by [facility], by registering with the following website: [email or web address]

## 2019-07-02 NOTE — PROGRESS NOTE ADULT - SUBJECTIVE AND OBJECTIVE BOX
Pt seen and examined   flex sig today  feels better but only liquid BM    REVIEW OF SYSTEMS:  Constitutional: No fever, weight loss or fatigue  Cardiovascular: No chest pain, palpitations, dizziness or leg swelling  Gastrointestinal: No abdominal or epigastric pain. No nausea, vomiting or hematemesis; liquid BM  Skin: No itching, burning, rashes or lesions       MEDICATIONS:  MEDICATIONS  (STANDING):  atorvastatin 10 milliGRAM(s) Oral daily  lactulose Syrup 10 Gram(s) Oral daily  misoprostol 100 MICROGram(s) Oral four times a day  potassium chloride    Tablet ER 20 milliEquivalent(s) Oral once  selegiline Oral Tab/Cap 10 milliGRAM(s) Oral <User Schedule>    MEDICATIONS  (PRN):      Allergies    No Known Allergies    Intolerances        Vital Signs Last 24 Hrs  T(C): 36.7 (02 Jul 2019 09:26), Max: 36.8 (01 Jul 2019 12:23)  T(F): 98 (02 Jul 2019 09:26), Max: 98.2 (01 Jul 2019 12:23)  HR: 96 (02 Jul 2019 09:26) (84 - 96)  BP: 147/85 (02 Jul 2019 09:26) (113/70 - 151/85)  BP(mean): --  RR: 16 (02 Jul 2019 09:26) (15 - 16)  SpO2: 96% (02 Jul 2019 09:26) (96% - 97%)    07-01 @ 07:01  -  07-02 @ 07:00  --------------------------------------------------------  IN: 0 mL / OUT: 650 mL / NET: -650 mL        PHYSICAL EXAM:    General:  in no acute distress  HEENT: MMM, conjunctiva and sclera clear  Lungs: clear  heart: regular  Gastrointestinal: Soft non-tender non-distended; Normal bowel sounds; No hepatosplenomegaly  Skin: Warm and dry. No obvious rash    LABS:      CBC Full  -  ( 01 Jul 2019 11:09 )  WBC Count : 7.94 K/uL  RBC Count : 4.20 M/uL  Hemoglobin : 11.7 g/dL  Hematocrit : 35.9 %  Platelet Count - Automated : 225 K/uL  Mean Cell Volume : 85.5 fl  Mean Cell Hemoglobin : 27.9 pg  Mean Cell Hemoglobin Concentration : 32.6 gm/dL  Auto Neutrophil # : x  Auto Lymphocyte # : x  Auto Monocyte # : x  Auto Eosinophil # : x  Auto Basophil # : x  Auto Neutrophil % : x  Auto Lymphocyte % : x  Auto Monocyte % : x  Auto Eosinophil % : x  Auto Basophil % : x    07-01    137  |  101  |  19  ----------------------------<  128<H>  3.8   |  25  |  0.94    Ca    8.6      01 Jul 2019 11:09  Mg     2.0     07-01      PT/INR - ( 30 Jun 2019 18:17 )   PT: 13.1 sec;   INR: 1.15                Urinalysis Basic - ( 30 Jun 2019 15:52 )    Color: Yellow / Appearance: Clear / SG: <=1.005 / pH: x  Gluc: x / Ketone: 15 mg/dL  / Bili: Negative / Urobili: 0.2 E.U./dL   Blood: x / Protein: NEGATIVE mg/dL / Nitrite: NEGATIVE   Leuk Esterase: NEGATIVE / RBC: < 5 /HPF / WBC < 5 /HPF   Sq Epi: x / Non Sq Epi: 0-5 /HPF / Bacteria: Present /HPF                RADIOLOGY & ADDITIONAL STUDIES (The following images were personally reviewed):

## 2019-07-02 NOTE — DISCHARGE NOTE PROVIDER - NSDCCPCAREPLAN_GEN_ALL_CORE_FT
PRINCIPAL DISCHARGE DIAGNOSIS  Diagnosis: Fecal impaction of colon  Assessment and Plan of Treatment: You were admitted with severe constipation that was refractory to multiple bowel regimens. You underwent flexible sigmoidoscopy with disimpaction, and were found to have a rectal ulcer. Per Dr. Santacruz, you can be discharged with prescriptions for misoprostol 100mg (to be taken 4 times a day) and lactulose 30 ml (to be taken twice a day). He also recommends that you drink 6-8 ounces of water daily.      SECONDARY DISCHARGE DIAGNOSES  Diagnosis: Urinary retention  Assessment and Plan of Treatment: You came in with urinary retention. On admission, a silva catheter was inserted to drain your bladder. We recommend that you keep the silva catheter on until your constipation has fully resolved. The nurse will have given you instructions on how to manage the leg bag. Please follow up with Dr. Kulkarni (Urology) in 1 week for removal of the Silva catheter and further evaluation. PRINCIPAL DISCHARGE DIAGNOSIS  Diagnosis: Fecal impaction of colon  Assessment and Plan of Treatment: You were admitted with severe constipation that was refractory to multiple bowel regimens. You underwent flexible sigmoidoscopy with disimpaction, and were found to have a rectal ulcer. Per Dr. Santacruz, you can be discharged with prescriptions for misoprostol 100mg (to be taken 4 times a day) and lactulose 30 ml (to be taken twice a day). He also recommends that you drink 6-8 glasses of water daily. Please make an appointment with Dr. Santacruz at your convenience to follow up and reevaluate your need for further treatment.      SECONDARY DISCHARGE DIAGNOSES  Diagnosis: Urinary retention  Assessment and Plan of Treatment: You came in with urinary retention. On admission, a silva catheter was inserted to drain your bladder. We recommend that you keep the silva catheter on until your constipation has fully resolved. The nurse will have given you instructions on how to manage the leg bag. Please follow up with Dr. Kulkarni (Urology) in 1 week for removal of the Silva catheter and further evaluation.

## 2019-07-02 NOTE — DISCHARGE NOTE PROVIDER - HOSPITAL COURSE
73F with Parkinson's disease presents with worsening abdominal distension and rectal pain over the course of 1 week        1.) Stercoral colitis, status post flexible sigmoidoscopy, disimpaction and discovery of rectal ulcer    The patient was severely constipated 73F with Parkinson's disease presents with worsening abdominal distension and rectal pain over the course of 1 week and urinary retention        1.) Stercoral colitis, status post flexible sigmoidoscopy, disimpaction and discovery of rectal ulcer    The patient was severely constipated on admission with significant stool burden but no detected pathology on abdominal CT scan. Patient was given multiple enemas, Genesis, and stool softeners with no relief. Patient underwent a flexible sigmoidoscopy and disimpaction, with a new finding of a rectal ulcer. Patient has remained stable throughout the duration of her admission. We are discharging her with new medications as per GI's recommendations    Follow up: Please ensure medication compliance and periodically assess need for further treatment        2.) Urinary retention    Patient also came in with urinary retention. A silva catheter was placed upon admission with significant drainage. Patient will be discharged with the silva, with instructions to follow up with Urology for silva removal and trial to void.    Follow up: Please monitor for further urinary complications

## 2019-07-02 NOTE — DISCHARGE NOTE PROVIDER - CARE PROVIDER_API CALL
Clayton Flannery)  Internal Medicine; Nephrology  [address] [place] [address]  Phone: [phone]  Fax: [phone]  Follow Up Time: Clayton Flannery)  Internal Medicine; Nephrology  [address] [place] [address]  Phone: [phone]  Fax: [phone]  Follow Up Time:     Negro Kulkarni)  Urology  [address] [place] [address]  Phone: [phone]  Fax: [phone]  Follow Up Time: 1 week Clayton Flannery)  Internal Medicine; Nephrology  [address] [address]  Phone: [phone]  Fax: [phone]  Follow Up Time:     Negro Kulkarni)  Urology  [address] [address]  Phone: [phone]  Fax: [phone]  Follow Up Time: 1 week    Samir Santacruz)  Medicine  [address], [facility] [address]  [place] [address]  Phone: [phone]  Fax: [phone]  Follow Up Time:

## 2019-07-03 RX ORDER — LACTULOSE 10 G/15ML
15 SOLUTION ORAL
Qty: 300 | Refills: 0
Start: 2019-07-03 | End: 2019-07-12

## 2019-07-08 DIAGNOSIS — E78.5 HYPERLIPIDEMIA, UNSPECIFIED: ICD-10-CM

## 2019-07-08 DIAGNOSIS — K56.41 FECAL IMPACTION: ICD-10-CM

## 2019-07-08 DIAGNOSIS — I10 ESSENTIAL (PRIMARY) HYPERTENSION: ICD-10-CM

## 2019-07-08 DIAGNOSIS — K64.4 RESIDUAL HEMORRHOIDAL SKIN TAGS: ICD-10-CM

## 2019-07-08 DIAGNOSIS — K52.9 NONINFECTIVE GASTROENTERITIS AND COLITIS, UNSPECIFIED: ICD-10-CM

## 2019-07-08 DIAGNOSIS — G20 PARKINSON'S DISEASE: ICD-10-CM

## 2019-07-08 DIAGNOSIS — K62.6 ULCER OF ANUS AND RECTUM: ICD-10-CM

## 2019-07-08 DIAGNOSIS — K52.89 OTHER SPECIFIED NONINFECTIVE GASTROENTERITIS AND COLITIS: ICD-10-CM

## 2019-07-08 DIAGNOSIS — R33.9 RETENTION OF URINE, UNSPECIFIED: ICD-10-CM

## 2020-02-19 NOTE — ED ADULT TRIAGE NOTE - HEIGHT IN CM
OCCUPATIONAL THERAPY EVALUATION - INPATIENT     Room Number: 1838/3645-I  Evaluation Date: 2/19/2020  Type of Evaluation: Initial  Presenting Problem: R MCA, R ICA stenosis, R CEA scheduled on 2/20    Physician Order: IP Consult to Occupational Therapy  Re MRI.  There are other areas of infarct which are not as well appreciated on this exam.    Continued follow-up is suggested.      There is a short segment of high-grade stenosis involving the right internal carotid artery shortly after its origin over a segm None                PAIN ASSESSMENT  Ratin          COGNITION  Overall Cognitive Status:  Impaired  Attention Span:  difficulty attending to directions and difficulty dividing attention  Following Commands:  follows multistep commands with increased ti Rapid Alternating Movement: Symmetrical  Coordination - Finger Opposition: Asymmetrical;Left decreased speed;Left decreased accuracy       ACTIVITY TOLERANCE                         O2 SATURATIONS                ACTIVITIES OF DAILY LIVING ASSESSMENT  AM-PA insight and commented, \"It's great, I am fine. \"    Patient End of Session: Up in chair;Needs met;Call light within reach;RN aware of session/findings; All patient questions and concerns addressed; Family present    ASSESSMENT     Patient is a 76year old m Deficits HIGH - Comorbidities and significant modifications of tasks    Clinical Decision Making HIGH - Analysis of occupational profile, comprehensive assessments, multiple treatment options    Overall Complexity MODERATE     OT Discharge Recommendations: 167.64

## 2020-03-17 NOTE — PATIENT PROFILE ADULT - FALL HARM RISK TYPE OF ASSESSMENT
"Per Ansley Baxter, \"Spoke with RN at [facility]. Will help facilitate getting samples to try a flat pouch for now\" regarding pt wound ostomy referral from Dr. Auguste.   " admission

## 2020-07-02 NOTE — PROGRESS NOTE ADULT - ASSESSMENT
Plan:  for retention (1.5 liters)  silva for now  flex sig tomorrow  keep on clears for today Clindamycin Counseling: I counseled the patient regarding use of clindamycin as an antibiotic for prophylactic and/or therapeutic purposes. Clindamycin is active against numerous classes of bacteria, including skin bacteria. Side effects may include nausea, diarrhea, gastrointestinal upset, rash, hives, yeast infections, and in rare cases, colitis.

## 2020-09-22 ENCOUNTER — TRANSCRIPTION ENCOUNTER (OUTPATIENT)
Age: 75
End: 2020-09-22

## 2020-12-31 NOTE — PATIENT PROFILE ADULT - ARE SIGNIFICANT INDICATORS COMPLETE.
[facility] PROGRESS NOTE Follow up for: PRATIMA Subjective:  
Patient seen and examined in [facility] ICU remains intubated on vent 45% FiO2, 12 PEEP, on dopamine and fentanyl. SHAMIKA ICU RN. Labs and chart reviewed- renal function worsening, non oliguric ROS: 
Un able to obtain Objective:  
Exam: 
Vitals:  
 12/31/20 0930 12/31/20 0931 12/31/20 1000 12/31/20 1015 BP:  (!) 140/67 135/73 (!) 148/73 Pulse: 70 70 79 73 Resp: 28 27 28 27 Temp:      
SpO2: 97% 97% 96% 96% Weight:      
Height:      
 
 
 
Intake/Output Summary (Last 24 hours) at 12/31/2020 1044 Last data filed at 12/31/2020 0800 Gross per 24 hour Intake 669.44 ml Output 700 ml Net -30.56 ml  
 
 
Current Facility-Administered Medications Medication Dose Route Frequency  fentaNYL in normal saline (pf) 25 mcg/mL infusion  0-200 mcg/hr IntraVENous TITRATE  midazolam in normal saline (VERSED) 1 mg/mL infusion  0-10 mg/hr IntraVENous TITRATE  ascorbic acid (vitamin C) (VITAMIN C) tablet 1,000 mg  1,000 mg Per NG tube BID  cholecalciferol (VITAMIN D3) (1000 Units /25 mcg) tablet 5 Tab  5,000 Units Per NG tube DAILY  hydrALAZINE (APRESOLINE) tablet 10 mg  10 mg Per NG tube TID  zinc sulfate tablet 220 mg  220 mg Per G Tube DAILY  NOREPINephrine (LEVOPHED) 4 mg in 5% dextrose 250 mL infusion  0.5-16 mcg/min IntraVENous TITRATE  DOPamine (INTROPIN) 800 mg in dextrose 5% 250 mL infusion  0-50 mcg/kg/min IntraVENous TITRATE  famotidine (PF) (PEPCID) 20 mg in 0.9% sodium chloride 10 mL injection  20 mg IntraVENous DAILY  dextrose 40% (GLUTOSE) oral gel 1 Tube  15 g Oral PRN  
 glucagon (GLUCAGEN) injection 1 mg  1 mg IntraMUSCular PRN  
 dextrose (D50W) injection syrg 12.5-25 g  25-50 mL IntraVENous PRN  
 albuterol (PROVENTIL HFA, VENTOLIN HFA, PROAIR HFA) inhaler 2 Puff  2 Puff Inhalation Q4H PRN  
 hydrALAZINE (APRESOLINE) 20 mg/mL injection 20 mg  20 mg IntraVENous Q2H PRN  
  insulin lispro (HUMALOG) injection 0-15 Units  0-15 Units SubCUTAneous AC&HS  insulin glargine (LANTUS) injection 20 Units  20 Units SubCUTAneous BID  phenol throat spray (CHLORASEPTIC) 1 Spray  1 Spray Oral PRN  
 lip protectant (BLISTEX) ointment 1 Each  1 Each Topical PRN  
 acetaminophen (TYLENOL) tablet 650 mg  650 mg Oral Q6H PRN  
 cefTRIAXone (ROCEPHIN) 1 g in 0.9% sodium chloride (MBP/ADV) 50 mL MBP  1 g IntraVENous Q24H  
 0.9% sodium chloride infusion 250 mL  250 mL IntraVENous PRN  
 sodium chloride (NS) flush 5-40 mL  5-40 mL IntraVENous Q8H  
 sodium chloride (NS) flush 5-40 mL  5-40 mL IntraVENous PRN  
 heparin (porcine) injection 5,000 Units  5,000 Units SubCUTAneous Q8H  
 dexamethasone (DECADRON) 4 mg/mL injection 6 mg  6 mg IntraVENous DAILY  albuterol (PROVENTIL VENTOLIN) nebulizer solution 2.5 mg  2.5 mg Nebulization Q6H RT  
 
 
EXAM 
GEN - intubated, sedated on vent in no distress CV - S1, S2, Regular rate, no Rub Lung - diminished lung sounds bilaterally Abd - soft, nontender, BS present Ext - no edema Recent Labs 12/31/20 
0411 12/30/20 
0258 12/29/20 
6913 WBC 12.4* 15.4* 14.8* HGB 10.6* 11.9 10.9* HCT 32.3* 35.8 32.5*  
 238 260 Recent Labs 12/31/20 
0411 12/30/20 
0258 12/29/20 
1110  138 139  
K 5.8* 4.8 4.8  
* 112* 109* CO2 23 22 22 BUN 85* 78* 78* CREA 3.36* 3.18* 3.12* CA 8.2* 8.3 8.3 GLU 53* 135* 165* MG 2.9* 2.8*  --   
PHOS 5.2* 4.3*  --   
 
 
Assessment and Plan: 1. PRATIMA over CKD Possibly COVID ATN , non oliguric Baseline 1 to 1.5 , admitted with Cr of 2.8 on 12/24  
-Echo from 2019 - Normal EF and systolic function Renal function worsening, non oliguric, no indication for acute RRT at this time Trending renal function with strict I&O 2. Hyperkalemia- add calcium gluconate, trend  
  
3. COVID 19 + AirVo and BiPap @ HS, on dexamethasone Not a candidate for remdesivir On rocephin, azithromycin  
  
4. Anemia stable  
  
5. Hypermagnesemia and phosphatemia seen sec to PRATIMA Declan Salinas, NP 
 Yes

## 2021-08-02 NOTE — ED PROVIDER NOTE - SKIN, MLM
Immunization chart prep completed.  Immunization records verified.  Lisette Burger due for All Vacinations Up To Date No Vaccinations Needed   Skin normal color for race, warm, dry and intact. No evidence of rash.

## 2021-10-27 ENCOUNTER — INPATIENT (INPATIENT)
Facility: HOSPITAL | Age: 76
LOS: 6 days | Discharge: NURSING, REHAB OR LONG-TERM CARE | DRG: 521 | End: 2021-11-03
Attending: ORTHOPAEDIC SURGERY | Admitting: ORTHOPAEDIC SURGERY
Payer: MEDICARE

## 2021-10-27 ENCOUNTER — TRANSCRIPTION ENCOUNTER (OUTPATIENT)
Age: 76
End: 2021-10-27

## 2021-10-27 VITALS
DIASTOLIC BLOOD PRESSURE: 99 MMHG | RESPIRATION RATE: 18 BRPM | TEMPERATURE: 98 F | OXYGEN SATURATION: 96 % | HEIGHT: 66 IN | SYSTOLIC BLOOD PRESSURE: 163 MMHG | WEIGHT: 136.91 LBS | HEART RATE: 103 BPM

## 2021-10-27 LAB
ANION GAP SERPL CALC-SCNC: 11 MMOL/L — SIGNIFICANT CHANGE UP (ref 5–17)
APTT BLD: 26.8 SEC — LOW (ref 27.5–35.5)
BASOPHILS # BLD AUTO: 0.05 K/UL — SIGNIFICANT CHANGE UP (ref 0–0.2)
BASOPHILS NFR BLD AUTO: 0.6 % — SIGNIFICANT CHANGE UP (ref 0–2)
BLD GP AB SCN SERPL QL: NEGATIVE — SIGNIFICANT CHANGE UP
BUN SERPL-MCNC: 27 MG/DL — HIGH (ref 7–23)
CALCIUM SERPL-MCNC: 9.4 MG/DL — SIGNIFICANT CHANGE UP (ref 8.4–10.5)
CHLORIDE SERPL-SCNC: 108 MMOL/L — SIGNIFICANT CHANGE UP (ref 96–108)
CO2 SERPL-SCNC: 25 MMOL/L — SIGNIFICANT CHANGE UP (ref 22–31)
CREAT SERPL-MCNC: 1.17 MG/DL — SIGNIFICANT CHANGE UP (ref 0.5–1.3)
EOSINOPHIL # BLD AUTO: 0.06 K/UL — SIGNIFICANT CHANGE UP (ref 0–0.5)
EOSINOPHIL NFR BLD AUTO: 0.7 % — SIGNIFICANT CHANGE UP (ref 0–6)
GLUCOSE SERPL-MCNC: 128 MG/DL — HIGH (ref 70–99)
HCT VFR BLD CALC: 37.7 % — SIGNIFICANT CHANGE UP (ref 34.5–45)
HGB BLD-MCNC: 12.2 G/DL — SIGNIFICANT CHANGE UP (ref 11.5–15.5)
IMM GRANULOCYTES NFR BLD AUTO: 0.3 % — SIGNIFICANT CHANGE UP (ref 0–1.5)
INR BLD: 1.11 — SIGNIFICANT CHANGE UP (ref 0.88–1.16)
LYMPHOCYTES # BLD AUTO: 2.12 K/UL — SIGNIFICANT CHANGE UP (ref 1–3.3)
LYMPHOCYTES # BLD AUTO: 24.5 % — SIGNIFICANT CHANGE UP (ref 13–44)
MCHC RBC-ENTMCNC: 27.7 PG — SIGNIFICANT CHANGE UP (ref 27–34)
MCHC RBC-ENTMCNC: 32.4 GM/DL — SIGNIFICANT CHANGE UP (ref 32–36)
MCV RBC AUTO: 85.7 FL — SIGNIFICANT CHANGE UP (ref 80–100)
MONOCYTES # BLD AUTO: 0.77 K/UL — SIGNIFICANT CHANGE UP (ref 0–0.9)
MONOCYTES NFR BLD AUTO: 8.9 % — SIGNIFICANT CHANGE UP (ref 2–14)
NEUTROPHILS # BLD AUTO: 5.63 K/UL — SIGNIFICANT CHANGE UP (ref 1.8–7.4)
NEUTROPHILS NFR BLD AUTO: 65 % — SIGNIFICANT CHANGE UP (ref 43–77)
NRBC # BLD: 0 /100 WBCS — SIGNIFICANT CHANGE UP (ref 0–0)
PLATELET # BLD AUTO: 253 K/UL — SIGNIFICANT CHANGE UP (ref 150–400)
POTASSIUM SERPL-MCNC: 4.2 MMOL/L — SIGNIFICANT CHANGE UP (ref 3.5–5.3)
POTASSIUM SERPL-SCNC: 4.2 MMOL/L — SIGNIFICANT CHANGE UP (ref 3.5–5.3)
PROTHROM AB SERPL-ACNC: 13.2 SEC — SIGNIFICANT CHANGE UP (ref 10.6–13.6)
RBC # BLD: 4.4 M/UL — SIGNIFICANT CHANGE UP (ref 3.8–5.2)
RBC # FLD: 13.8 % — SIGNIFICANT CHANGE UP (ref 10.3–14.5)
RH IG SCN BLD-IMP: POSITIVE — SIGNIFICANT CHANGE UP
SARS-COV-2 RNA SPEC QL NAA+PROBE: NEGATIVE — SIGNIFICANT CHANGE UP
SODIUM SERPL-SCNC: 144 MMOL/L — SIGNIFICANT CHANGE UP (ref 135–145)
WBC # BLD: 8.66 K/UL — SIGNIFICANT CHANGE UP (ref 3.8–10.5)
WBC # FLD AUTO: 8.66 K/UL — SIGNIFICANT CHANGE UP (ref 3.8–10.5)

## 2021-10-27 PROCEDURE — 73502 X-RAY EXAM HIP UNI 2-3 VIEWS: CPT | Mod: 26,LT

## 2021-10-27 PROCEDURE — 73502 X-RAY EXAM HIP UNI 2-3 VIEWS: CPT | Mod: 26

## 2021-10-27 PROCEDURE — 73552 X-RAY EXAM OF FEMUR 2/>: CPT | Mod: 26,LT

## 2021-10-27 PROCEDURE — 71045 X-RAY EXAM CHEST 1 VIEW: CPT | Mod: 26

## 2021-10-27 PROCEDURE — 93010 ELECTROCARDIOGRAM REPORT: CPT

## 2021-10-27 PROCEDURE — 99285 EMERGENCY DEPT VISIT HI MDM: CPT | Mod: 25

## 2021-10-27 PROCEDURE — 71045 X-RAY EXAM CHEST 1 VIEW: CPT | Mod: 26,77

## 2021-10-27 RX ORDER — SODIUM CHLORIDE 9 MG/ML
1000 INJECTION, SOLUTION INTRAVENOUS
Refills: 0 | Status: DISCONTINUED | OUTPATIENT
Start: 2021-10-27 | End: 2021-11-03

## 2021-10-27 RX ORDER — OXYCODONE HYDROCHLORIDE 5 MG/1
5 TABLET ORAL EVERY 4 HOURS
Refills: 0 | Status: DISCONTINUED | OUTPATIENT
Start: 2021-10-27 | End: 2021-10-27

## 2021-10-27 RX ORDER — OXYCODONE HYDROCHLORIDE 5 MG/1
10 TABLET ORAL EVERY 4 HOURS
Refills: 0 | Status: DISCONTINUED | OUTPATIENT
Start: 2021-10-27 | End: 2021-10-27

## 2021-10-27 RX ORDER — HYDROMORPHONE HYDROCHLORIDE 2 MG/ML
0.5 INJECTION INTRAMUSCULAR; INTRAVENOUS; SUBCUTANEOUS EVERY 6 HOURS
Refills: 0 | Status: DISCONTINUED | OUTPATIENT
Start: 2021-10-27 | End: 2021-10-28

## 2021-10-27 RX ORDER — ACETAMINOPHEN 500 MG
1000 TABLET ORAL ONCE
Refills: 0 | Status: COMPLETED | OUTPATIENT
Start: 2021-10-27 | End: 2021-10-27

## 2021-10-27 RX ADMIN — Medication 400 MILLIGRAM(S): at 21:03

## 2021-10-27 NOTE — ED PROVIDER NOTE - NS ED MD DISPO ISOLATION TYPES
“You can access the Follow[employer] Patient Portal, offered by [facility], by registering with the following website: [email or web address]”
None

## 2021-10-27 NOTE — ED PROVIDER NOTE - MUSCULOSKELETAL, MLM
Spine appears normal, pain in left hip. left leg shortened. hurts to palpate, move hip joint. knee normal.

## 2021-10-27 NOTE — ED PROVIDER NOTE - OBJECTIVE STATEMENT
history of parkinsons with balance issues, here with fall. Was in her living room, turned and lost balance, fell onto left side. Unable to get up due to pain in left leg/hip. Denies head injury, neck/back pain, loc, dizziness, chest pain. Called doorman using apple watch and came and called 911. Hurts to touch area, attempted movement. Better with rest. Didn't take anything for pain.

## 2021-10-27 NOTE — ED ADULT TRIAGE NOTE - OTHER COMPLAINTS
patient BIBEMS from home c/o fall at home p/w L hip pain-- patient reports "I just felt weak and I fell"-- denies head strike-- no shortening or external rotation noted

## 2021-10-27 NOTE — ED ADULT NURSE NOTE - OBJECTIVE STATEMENT
received A&OX4 76years old female pt with c/o of " I have hx of parkinson's and today I was standing and lost balance and fell to my left." pt denies head trauma, LOC, nausea, dizziness. pt complains of left hip pain, 7/10, she wasn't able to get up by herself from the fall. sensation intact bilateral lower extremities. pt able to wiggle her toes, usually pt ambulates independently.

## 2021-10-28 ENCOUNTER — RESULT REVIEW (OUTPATIENT)
Age: 76
End: 2021-10-28

## 2021-10-28 PROBLEM — G20 PARKINSON'S DISEASE: Chronic | Status: ACTIVE | Noted: 2019-06-30

## 2021-10-28 PROBLEM — E78.5 HYPERLIPIDEMIA, UNSPECIFIED: Chronic | Status: ACTIVE | Noted: 2019-06-30

## 2021-10-28 LAB
ANION GAP SERPL CALC-SCNC: 9 MMOL/L — SIGNIFICANT CHANGE UP (ref 5–17)
APPEARANCE UR: CLEAR — SIGNIFICANT CHANGE UP
BACTERIA # UR AUTO: PRESENT /HPF
BASOPHILS # BLD AUTO: 0.05 K/UL — SIGNIFICANT CHANGE UP (ref 0–0.2)
BASOPHILS NFR BLD AUTO: 0.5 % — SIGNIFICANT CHANGE UP (ref 0–2)
BILIRUB UR-MCNC: NEGATIVE — SIGNIFICANT CHANGE UP
BLD GP AB SCN SERPL QL: NEGATIVE — SIGNIFICANT CHANGE UP
BUN SERPL-MCNC: 27 MG/DL — HIGH (ref 7–23)
CALCIUM SERPL-MCNC: 9.3 MG/DL — SIGNIFICANT CHANGE UP (ref 8.4–10.5)
CHLORIDE SERPL-SCNC: 108 MMOL/L — SIGNIFICANT CHANGE UP (ref 96–108)
CO2 SERPL-SCNC: 24 MMOL/L — SIGNIFICANT CHANGE UP (ref 22–31)
COLOR SPEC: YELLOW — SIGNIFICANT CHANGE UP
COVID-19 SPIKE DOMAIN AB INTERP: POSITIVE
COVID-19 SPIKE DOMAIN ANTIBODY RESULT: >250 U/ML — HIGH
CREAT SERPL-MCNC: 1.1 MG/DL — SIGNIFICANT CHANGE UP (ref 0.5–1.3)
DIFF PNL FLD: ABNORMAL
EOSINOPHIL # BLD AUTO: 0.04 K/UL — SIGNIFICANT CHANGE UP (ref 0–0.5)
EOSINOPHIL NFR BLD AUTO: 0.4 % — SIGNIFICANT CHANGE UP (ref 0–6)
EPI CELLS # UR: SIGNIFICANT CHANGE UP /HPF (ref 0–5)
GLUCOSE SERPL-MCNC: 133 MG/DL — HIGH (ref 70–99)
GLUCOSE UR QL: NEGATIVE — SIGNIFICANT CHANGE UP
HCT VFR BLD CALC: 37.9 % — SIGNIFICANT CHANGE UP (ref 34.5–45)
HGB BLD-MCNC: 12 G/DL — SIGNIFICANT CHANGE UP (ref 11.5–15.5)
IMM GRANULOCYTES NFR BLD AUTO: 0.3 % — SIGNIFICANT CHANGE UP (ref 0–1.5)
KETONES UR-MCNC: ABNORMAL MG/DL
LDH SERPL L TO P-CCNC: 216 U/L — SIGNIFICANT CHANGE UP (ref 50–242)
LEUKOCYTE ESTERASE UR-ACNC: NEGATIVE — SIGNIFICANT CHANGE UP
LYMPHOCYTES # BLD AUTO: 1.86 K/UL — SIGNIFICANT CHANGE UP (ref 1–3.3)
LYMPHOCYTES # BLD AUTO: 19.6 % — SIGNIFICANT CHANGE UP (ref 13–44)
MCHC RBC-ENTMCNC: 27.7 PG — SIGNIFICANT CHANGE UP (ref 27–34)
MCHC RBC-ENTMCNC: 31.7 GM/DL — LOW (ref 32–36)
MCV RBC AUTO: 87.5 FL — SIGNIFICANT CHANGE UP (ref 80–100)
MONOCYTES # BLD AUTO: 0.93 K/UL — HIGH (ref 0–0.9)
MONOCYTES NFR BLD AUTO: 9.8 % — SIGNIFICANT CHANGE UP (ref 2–14)
NEUTROPHILS # BLD AUTO: 6.57 K/UL — SIGNIFICANT CHANGE UP (ref 1.8–7.4)
NEUTROPHILS NFR BLD AUTO: 69.4 % — SIGNIFICANT CHANGE UP (ref 43–77)
NITRITE UR-MCNC: NEGATIVE — SIGNIFICANT CHANGE UP
NRBC # BLD: 0 /100 WBCS — SIGNIFICANT CHANGE UP (ref 0–0)
PH UR: 5.5 — SIGNIFICANT CHANGE UP (ref 5–8)
PLATELET # BLD AUTO: 221 K/UL — SIGNIFICANT CHANGE UP (ref 150–400)
POTASSIUM SERPL-MCNC: 4.6 MMOL/L — SIGNIFICANT CHANGE UP (ref 3.5–5.3)
POTASSIUM SERPL-SCNC: 4.6 MMOL/L — SIGNIFICANT CHANGE UP (ref 3.5–5.3)
PROT UR-MCNC: NEGATIVE MG/DL — SIGNIFICANT CHANGE UP
RBC # BLD: 4.33 M/UL — SIGNIFICANT CHANGE UP (ref 3.8–5.2)
RBC # FLD: 13.8 % — SIGNIFICANT CHANGE UP (ref 10.3–14.5)
RBC CASTS # UR COMP ASSIST: > 10 /HPF
RH IG SCN BLD-IMP: POSITIVE — SIGNIFICANT CHANGE UP
SARS-COV-2 IGG+IGM SERPL QL IA: >250 U/ML — HIGH
SARS-COV-2 IGG+IGM SERPL QL IA: POSITIVE
SODIUM SERPL-SCNC: 141 MMOL/L — SIGNIFICANT CHANGE UP (ref 135–145)
SP GR SPEC: 1.02 — SIGNIFICANT CHANGE UP (ref 1–1.03)
TSH SERPL-MCNC: 1.86 UIU/ML — SIGNIFICANT CHANGE UP (ref 0.27–4.2)
UROBILINOGEN FLD QL: 0.2 E.U./DL — SIGNIFICANT CHANGE UP
WBC # BLD: 9.48 K/UL — SIGNIFICANT CHANGE UP (ref 3.8–10.5)
WBC # FLD AUTO: 9.48 K/UL — SIGNIFICANT CHANGE UP (ref 3.8–10.5)
WBC UR QL: < 5 /HPF — SIGNIFICANT CHANGE UP

## 2021-10-28 PROCEDURE — 99223 1ST HOSP IP/OBS HIGH 75: CPT

## 2021-10-28 PROCEDURE — 88300 SURGICAL PATH GROSS: CPT | Mod: 26

## 2021-10-28 PROCEDURE — 73502 X-RAY EXAM HIP UNI 2-3 VIEWS: CPT | Mod: 26,LT

## 2021-10-28 RX ORDER — SELEGILINE HYDROCHLORIDE 1.25 MG/1
5 TABLET, ORALLY DISINTEGRATING ORAL
Refills: 0 | Status: DISCONTINUED | OUTPATIENT
Start: 2021-10-28 | End: 2021-10-28

## 2021-10-28 RX ORDER — SENNA PLUS 8.6 MG/1
2 TABLET ORAL AT BEDTIME
Refills: 0 | Status: DISCONTINUED | OUTPATIENT
Start: 2021-10-28 | End: 2021-11-01

## 2021-10-28 RX ORDER — HYDROMORPHONE HYDROCHLORIDE 2 MG/ML
0.5 INJECTION INTRAMUSCULAR; INTRAVENOUS; SUBCUTANEOUS
Refills: 0 | Status: DISCONTINUED | OUTPATIENT
Start: 2021-10-28 | End: 2021-10-29

## 2021-10-28 RX ORDER — OXYCODONE HYDROCHLORIDE 5 MG/1
5 TABLET ORAL EVERY 4 HOURS
Refills: 0 | Status: DISCONTINUED | OUTPATIENT
Start: 2021-10-28 | End: 2021-10-29

## 2021-10-28 RX ORDER — ASPIRIN/CALCIUM CARB/MAGNESIUM 324 MG
325 TABLET ORAL
Refills: 0 | Status: DISCONTINUED | OUTPATIENT
Start: 2021-10-29 | End: 2021-11-03

## 2021-10-28 RX ORDER — ACETAMINOPHEN 500 MG
975 TABLET ORAL EVERY 8 HOURS
Refills: 0 | Status: DISCONTINUED | OUTPATIENT
Start: 2021-10-28 | End: 2021-11-03

## 2021-10-28 RX ORDER — OXYCODONE HYDROCHLORIDE 5 MG/1
10 TABLET ORAL EVERY 4 HOURS
Refills: 0 | Status: DISCONTINUED | OUTPATIENT
Start: 2021-10-28 | End: 2021-10-29

## 2021-10-28 RX ORDER — ATORVASTATIN CALCIUM 80 MG/1
10 TABLET, FILM COATED ORAL AT BEDTIME
Refills: 0 | Status: DISCONTINUED | OUTPATIENT
Start: 2021-10-28 | End: 2021-11-03

## 2021-10-28 RX ORDER — ONDANSETRON 8 MG/1
4 TABLET, FILM COATED ORAL EVERY 6 HOURS
Refills: 0 | Status: DISCONTINUED | OUTPATIENT
Start: 2021-10-28 | End: 2021-11-03

## 2021-10-28 RX ORDER — METOCLOPRAMIDE HCL 10 MG
10 TABLET ORAL ONCE
Refills: 0 | Status: COMPLETED | OUTPATIENT
Start: 2021-10-28 | End: 2021-10-28

## 2021-10-28 RX ORDER — CEFAZOLIN SODIUM 1 G
2000 VIAL (EA) INJECTION EVERY 8 HOURS
Refills: 0 | Status: DISCONTINUED | OUTPATIENT
Start: 2021-10-29 | End: 2021-10-30

## 2021-10-28 RX ORDER — ACETAMINOPHEN 500 MG
1000 TABLET ORAL ONCE
Refills: 0 | Status: COMPLETED | OUTPATIENT
Start: 2021-10-28 | End: 2021-10-28

## 2021-10-28 RX ORDER — FAMOTIDINE 10 MG/ML
20 INJECTION INTRAVENOUS EVERY 12 HOURS
Refills: 0 | Status: DISCONTINUED | OUTPATIENT
Start: 2021-10-28 | End: 2021-11-03

## 2021-10-28 RX ORDER — CHLORHEXIDINE GLUCONATE 213 G/1000ML
1 SOLUTION TOPICAL ONCE
Refills: 0 | Status: DISCONTINUED | OUTPATIENT
Start: 2021-10-28 | End: 2021-11-03

## 2021-10-28 RX ORDER — HYDROMORPHONE HYDROCHLORIDE 2 MG/ML
0.5 INJECTION INTRAMUSCULAR; INTRAVENOUS; SUBCUTANEOUS EVERY 4 HOURS
Refills: 0 | Status: DISCONTINUED | OUTPATIENT
Start: 2021-10-28 | End: 2021-11-03

## 2021-10-28 RX ORDER — POVIDONE-IODINE 5 %
1 AEROSOL (ML) TOPICAL ONCE
Refills: 0 | Status: DISCONTINUED | OUTPATIENT
Start: 2021-10-28 | End: 2021-11-03

## 2021-10-28 RX ORDER — METOCLOPRAMIDE HCL 10 MG
5 TABLET ORAL ONCE
Refills: 0 | Status: DISCONTINUED | OUTPATIENT
Start: 2021-10-28 | End: 2021-10-28

## 2021-10-28 RX ORDER — SELEGILINE HYDROCHLORIDE 1.25 MG/1
5 TABLET, ORALLY DISINTEGRATING ORAL EVERY 12 HOURS
Refills: 0 | Status: DISCONTINUED | OUTPATIENT
Start: 2021-10-28 | End: 2021-10-29

## 2021-10-28 RX ORDER — INFLUENZA VIRUS VACCINE 15; 15; 15; 15 UG/.5ML; UG/.5ML; UG/.5ML; UG/.5ML
0.7 SUSPENSION INTRAMUSCULAR ONCE
Refills: 0 | Status: DISCONTINUED | OUTPATIENT
Start: 2021-10-28 | End: 2021-11-03

## 2021-10-28 RX ADMIN — HYDROMORPHONE HYDROCHLORIDE 0.5 MILLIGRAM(S): 2 INJECTION INTRAMUSCULAR; INTRAVENOUS; SUBCUTANEOUS at 00:15

## 2021-10-28 RX ADMIN — SODIUM CHLORIDE 80 MILLILITER(S): 9 INJECTION, SOLUTION INTRAVENOUS at 00:19

## 2021-10-28 RX ADMIN — SELEGILINE HYDROCHLORIDE 5 MILLIGRAM(S): 1.25 TABLET, ORALLY DISINTEGRATING ORAL at 10:48

## 2021-10-28 RX ADMIN — SODIUM CHLORIDE 80 MILLILITER(S): 9 INJECTION, SOLUTION INTRAVENOUS at 06:05

## 2021-10-28 RX ADMIN — Medication 400 MILLIGRAM(S): at 12:07

## 2021-10-28 RX ADMIN — ONDANSETRON 4 MILLIGRAM(S): 8 TABLET, FILM COATED ORAL at 05:33

## 2021-10-28 RX ADMIN — Medication 10 MILLIGRAM(S): at 09:29

## 2021-10-28 RX ADMIN — HYDROMORPHONE HYDROCHLORIDE 0.5 MILLIGRAM(S): 2 INJECTION INTRAMUSCULAR; INTRAVENOUS; SUBCUTANEOUS at 00:00

## 2021-10-28 RX ADMIN — Medication 1000 MILLIGRAM(S): at 12:30

## 2021-10-28 NOTE — PROGRESS NOTE ADULT - SUBJECTIVE AND OBJECTIVE BOX
Ortho Note    Subjective:  Pt comfortable without complaints, patient reports  left hip pain she does not have left hip pain when she doesn't move.   Patient reports chronic numbness to the great toes of her bilateral feet.    Denies CP, SOB, N/V,       Vital Signs Last 24 Hrs  T(C): 36.8 (10-28-21 @ 13:06), Max: 36.8 (10-28-21 @ 13:00)  T(F): 98.2 (10-28-21 @ 13:06), Max: 98.2 (10-28-21 @ 13:00)  HR: 88 (10-28-21 @ 13:06) (86 - 88)  BP: 156/81 (10-28-21 @ 13:06) (156/81 - 172/92)  BP(mean): --  RR: 17 (10-28-21 @ 13:06) (17 - 18)  SpO2: 97% (10-28-21 @ 13:06) (96% - 97%)  AVSS    Objective:    Physical Exam:  General: Pt Alert and oriented, NAD  Pulses: +2 pedal pulses, wwp toes, cap refill less than 3 seconds  Sensation:  numbness to her great toes,    Motor: EHL/FHL/TA/GS- firing        Plan of Care:  A/P: 76yFemale with L G4 FNF for mireya vs EARL  - afebrile, nontoxic apperance  - Pain Control- tylenol IV 1000mg x1, Oxycodone 5-10mg PO Q4h prn moderate to severe pain, Dilaudid 0.5mg Q6h prn breakthrough pain - DVT ppx:- held for OR  - PT, WBS: bedrest until OR  - NPO for OR- LR @ 100 ml/hour  - Appreciate Medicine recs and clearance for OR  - Dispo- OR today    Ortho Pager [phone]

## 2021-10-28 NOTE — H&P ADULT - ASSESSMENT
76F hx of Parkinson's Dementia s/p L Femoral Neck Fracture, for OR today with Dr. Loja for L Hip Johan vs. EARL  - Admit to orthopedics, Regional floor  - NPO for OR  - Medical clearance in AM by Dr. Lanza  - Hold chemical DVT PPx for OR  - SCDs  - IVF  - Pain control  - Continue home Anti-Parkinson's meds    Jalen Ward, PGY-2  Orthopedic Surgery

## 2021-10-28 NOTE — H&P ADULT - NSHPPHYSICALEXAM_GEN_ALL_CORE
General: AAOx3, NAD  L Hip: Shortened, externally rotated  Motor 5/5 TA/GS/EHL, Quad/Psoas Deferred  SILT grossly SPN/DPN/Saph/Luis E/Tib  DP 2+

## 2021-10-28 NOTE — H&P ADULT - HISTORY OF PRESENT ILLNESS
76F PMHx of Parkinson's, HLD, presents after ground level fall onto L hip. Pt unable to bear weight or stand afterwards. Denies any head trauma/LOC. Pt notes she has been feeling "wobby" occasionally from her Parkinson's. Endorses that physical activity keeps her Parkinsonian symptoms at bay. Denies any numbness/tingling.

## 2021-10-28 NOTE — CONSULT NOTE ADULT - ASSESSMENT
76F w HLD, Parkinson's disease, p/w ground level fall onto L hip found to have Displaced L FNF - for OR w Dr. Loja - THR vs hemiarthroplasty    #Pre-operative evaluation  EKG: Sinus rhythm. 99  RCRI: Class I Risk  CONNELLY: 0.2% risk of MI or cardiac arrest intraoperatively or up to 30d post op  NSQIP: 0.1% risk of cardiac complication - below average.    - 18% risk of dc to rehab facility    #HLD - c/w home statin  #Parkinson's disease - c/w seleginline 5 BID  #L Hip fx - mgmt per orthopedics. pt has h/o osteopenia. see osteoporosis labs below  #Constipation - takes laxatives 3-4/week. Was admitted in the past for impaction. Pt has BM every 1-2 days at baseline    Recommendations  -patient is low risk for intermediate risk procedure  -Add TSH, Free thyroxine, vitamin 25-OH-D, serum PTH and LDH    -in setting of Parkinson's disease, pt is at increased risk of delirium. Avoid benzodiazepines. AVOID haldol. Optimize pain, start bowel regimen. AVOID physicial restraints. Mobilize as tolerated post-operatively.     PMD: Dr. Clayton Flannery  DISPO: TBD pending OR, post-op PT

## 2021-10-28 NOTE — CONSULT NOTE ADULT - SUBJECTIVE AND OBJECTIVE BOX
HPI "76F PMHx of Parkinson's, HLD, presents after ground level fall onto L hip. Pt unable to bear weight or stand afterwards. Denies any head trauma/LOC. Pt notes she has been feeling "wobby" occasionally from her Parkinson's. Endorses that physical activity keeps her Parkinsonian symptoms at bay. Denies any numbness/tingling. (28 Oct 2021 02:03)"    76F w HLD, Parkinson's disease, p/w ground level fall onto L hip found to have Displaced L FNF - for OR w Dr. Loja - THR vs hemiarthroplasty    #Pre-operative evaluation  EKG: Sinus rhythm. 99  #HLD - c/w home statin  #Parkinson's disease - c/w seleginline 5 BID  #L Hip fx - mgmt per orthopedics    Recommendations  -patient is ___ risk for intermediate risk procedure    -Add TSH, Free thyroxine, vitamin 25-OH-D, serum PTH and LDH    PMD: Dr. Clayton Flannery  DISPO: TBD pending OR, post-op PT    Allergies  FHx  SH      PAST MEDICAL & SURGICAL HISTORY:  Parkinson disease    Hyperlipidemia    No significant past surgical history      Home Meds: Home Medications:  Lipitor 10 mg oral tablet: 1 tab(s) orally once a day (01 Jul 2019 11:40)  selegiline 5 mg oral tablet: 1 tab(s) orally 2 times a day (01 Jul 2019 11:40)    Allergies: Allergies    penicillin (Swelling)  sulfa drugs (Nausea)    Intolerances      Soc:   Advanced Directives: Presumed Full Code     CURRENT MEDICATIONS:   --------------------------------------------------------------------------------------  Neurologic Medications  HYDROmorphone  Injectable 0.5 milliGRAM(s) IV Push every 6 hours PRN breakthrough  ondansetron Injectable 4 milliGRAM(s) IV Push every 6 hours PRN Nausea and/or Vomiting  oxyCODONE    IR 10 milliGRAM(s) Oral every 4 hours PRN Severe Pain (7 - 10)  oxyCODONE    IR 5 milliGRAM(s) Oral every 4 hours PRN Moderate Pain (4 - 6)  selegiline Oral Tab/Cap 5 milliGRAM(s) Oral every 12 hours    Respiratory Medications    Cardiovascular Medications    Gastrointestinal Medications  lactated ringers. 1000 milliLiter(s) IV Continuous <Continuous>    Genitourinary Medications    Hematologic/Oncologic Medications    Antimicrobial/Immunologic Medications    Endocrine/Metabolic Medications  atorvastatin 10 milliGRAM(s) Oral at bedtime    Topical/Other Medications  chlorhexidine 2% Cloths 1 Application(s) Topical once  povidone iodine 5% Nasal Swab 1 Application(s) Both Nostrils once    --------------------------------------------------------------------------------------    VITAL SIGNS, INS/OUTS (last 24 hours):  --------------------------------------------------------------------------------------  ICU Vital Signs Last 24 Hrs  T(C): 36.7 (28 Oct 2021 08:38), Max: 36.8 (28 Oct 2021 05:37)  T(F): 98.1 (28 Oct 2021 08:38), Max: 98.2 (28 Oct 2021 05:37)  HR: 86 (28 Oct 2021 08:38) (81 - 103)  BP: 166/92 (28 Oct 2021 09:54) (154/83 - 172/92)  BP(mean): --  ABP: --  ABP(mean): --  RR: 18 (28 Oct 2021 08:38) (18 - 19)  SpO2: 96% (28 Oct 2021 08:38) (95% - 96%)    I&O's Summary    28 Oct 2021 07:01  -  28 Oct 2021 10:01  --------------------------------------------------------  IN: 0 mL / OUT: 700 mL / NET: -700 mL      --------------------------------------------------------------------------------------    EXAM:    LABS  --------------------------------------------------------------------------------------  Labs:  CAPILLARY BLOOD GLUCOSE                              12.0   9.48  )-----------( 221      ( 28 Oct 2021 06:19 )             37.9       Auto Neutrophil %: 69.4 % (10-28-21 @ 06:19)  Auto Immature Granulocyte %: 0.3 % (10-28-21 @ 06:19)  Auto Neutrophil %: 65.0 % (10-27-21 @ 21:21)  Auto Immature Granulocyte %: 0.3 % (10-27-21 @ 21:21)    10-28    141  |  108  |  27<H>  ----------------------------<  133<H>  4.6   |  24  |  1.10      Calcium, Total Serum: 9.3 mg/dL (10-28-21 @ 06:19)      LFTs:         Coags:     13.2   ----< 1.11    ( 27 Oct 2021 21:21 )     26.8                      --------------------------------------------------------------------------------------    OTHER LABS    IMAGING RESULTS  ****************    CXR:   IMPRESSION: No acute cardiopulmonary disease process.       HPI "76F PMHx of Parkinson's, HLD, presents after ground level fall onto L hip. Pt unable to bear weight or stand afterwards. Denies any head trauma/LOC. Pt notes she has been feeling "wobby" occasionally from her Parkinson's. Endorses that physical activity keeps her Parkinsonian symptoms at bay. Denies any numbness/tingling. (28 Oct 2021 02:03)"    76F w HLD, Parkinson's disease, p/w ground level fall onto L hip found to have Displaced L FNF - for OR w Dr. Loja - THR vs hemiarthroplasty    Pt states she was dx w Parkinson's in 2017 and started selegiline. Has since tried to be as active as possible - goes to gym daily and does weights, balance exercises, pilates. Walked 3-4 miles last week without issues. However more recently she has noted increased symptoms including decrease in balance and difficulty sitting up from a seated position. Had fall/syncope several weeks ago - eval by PMD, cardiologist, neurologist - had holter monitor x5d wo abnormalities per patient. Pt yesterday was standing in her room, took a step, and then fell onto her L side. Denies hitting her head or LOC. Salvo pain in her L hip immediately. Called EMS via [employer] Phone. Found to have displaced FNF on L side. Pt does not use assistive devices at baseline. Currently pt denies any fever, chest pain, dyspnea, palpitations. Had some nausea from pain medication wo emesis. No abd pain. Holloway placed and pt was retaining 700cc. Denies dysuria. Reports baseline b/l toe numbness. Pain localized in R hip is controlled after receiving pain medication. No h/o CAD, MI, CHF, DM, CKD, CVA. No h/o DVT/PE.    ROS - 12 point ROS reviewed and otherwise negative per HPI    Allergies: nausea - sulfa, pcn, codeine. Reports leg swelling also w PCN. Denies any rash, dyspnea, lip/throat swelling w PCN  FHx: No h/o DVT/PE. Mother living age 100  SH: lives alone in apt. 6 steps leading into apt building. Never smoker, occasional EtOH. Has 2 children. Retired professional .       PAST MEDICAL & SURGICAL HISTORY:  Parkinson disease    Hyperlipidemia    No significant past surgical history      Home Meds: Home Medications:  Lipitor 10 mg oral tablet: 1 tab(s) orally once a day (01 Jul 2019 11:40)  selegiline 5 mg oral tablet: 1 tab(s) orally 2 times a day (01 Jul 2019 11:40)    Allergies: Allergies    penicillin (Swelling)  sulfa drugs (Nausea)    Intolerances      Soc:   Advanced Directives: Presumed Full Code     CURRENT MEDICATIONS:   --------------------------------------------------------------------------------------  Neurologic Medications  HYDROmorphone  Injectable 0.5 milliGRAM(s) IV Push every 6 hours PRN breakthrough  ondansetron Injectable 4 milliGRAM(s) IV Push every 6 hours PRN Nausea and/or Vomiting  oxyCODONE    IR 10 milliGRAM(s) Oral every 4 hours PRN Severe Pain (7 - 10)  oxyCODONE    IR 5 milliGRAM(s) Oral every 4 hours PRN Moderate Pain (4 - 6)  selegiline Oral Tab/Cap 5 milliGRAM(s) Oral every 12 hours    Respiratory Medications    Cardiovascular Medications    Gastrointestinal Medications  lactated ringers. 1000 milliLiter(s) IV Continuous <Continuous>    Genitourinary Medications    Hematologic/Oncologic Medications    Antimicrobial/Immunologic Medications    Endocrine/Metabolic Medications  atorvastatin 10 milliGRAM(s) Oral at bedtime    Topical/Other Medications  chlorhexidine 2% Cloths 1 Application(s) Topical once  povidone iodine 5% Nasal Swab 1 Application(s) Both Nostrils once    --------------------------------------------------------------------------------------    VITAL SIGNS, INS/OUTS (last 24 hours):  --------------------------------------------------------------------------------------  ICU Vital Signs Last 24 Hrs  T(C): 36.7 (28 Oct 2021 08:38), Max: 36.8 (28 Oct 2021 05:37)  T(F): 98.1 (28 Oct 2021 08:38), Max: 98.2 (28 Oct 2021 05:37)  HR: 86 (28 Oct 2021 08:38) (81 - 103)  BP: 166/92 (28 Oct 2021 09:54) (154/83 - 172/92)  BP(mean): --  ABP: --  ABP(mean): --  RR: 18 (28 Oct 2021 08:38) (18 - 19)  SpO2: 96% (28 Oct 2021 08:38) (95% - 96%)    I&O's Summary    28 Oct 2021 07:01  -  28 Oct 2021 10:01  --------------------------------------------------------  IN: 0 mL / OUT: 700 mL / NET: -700 mL      --------------------------------------------------------------------------------------    EXAM:  GEN: female in NAD on RA  HEENT: NC/AT, MMM  NECK: Supple  CV: RRR, nml S1S2, no murmurs  PULM: nml effort, CTAB wo rales, rhonchi, or weakness  ABD: Soft, non-distended, NABS, non-tender  NEURO  A/O x3, moving all extremities. sensation equal in BLE.   5/5 in BUE. Decreased strength in L hip and knee 2/2 pain. Held in figure 4 postion. L plantarflex/ext 5/5.   Faint tremor in b/l hands present  PSYCH: Appropriate    LABS  --------------------------------------------------------------------------------------  Labs:  CAPILLARY BLOOD GLUCOSE                              12.0   9.48  )-----------( 221      ( 28 Oct 2021 06:19 )             37.9       Auto Neutrophil %: 69.4 % (10-28-21 @ 06:19)  Auto Immature Granulocyte %: 0.3 % (10-28-21 @ 06:19)  Auto Neutrophil %: 65.0 % (10-27-21 @ 21:21)  Auto Immature Granulocyte %: 0.3 % (10-27-21 @ 21:21)    10-28    141  |  108  |  27<H>  ----------------------------<  133<H>  4.6   |  24  |  1.10      Calcium, Total Serum: 9.3 mg/dL (10-28-21 @ 06:19)      LFTs:         Coags:     13.2   ----< 1.11    ( 27 Oct 2021 21:21 )     26.8                      --------------------------------------------------------------------------------------    OTHER LABS    IMAGING RESULTS  ****************    CXR:   IMPRESSION: No acute cardiopulmonary disease process.

## 2021-10-28 NOTE — PROGRESS NOTE ADULT - SUBJECTIVE AND OBJECTIVE BOX
Ortho Post Op Check    Procedure: L hip hemiarthroplasty  Surgeon: Dr. Loja    Pt comfortable without complaints, pain controlled  Denies CP, SOB, N/V, numbness/tingling     Vital Signs Last 24 Hrs  T(C): --  T(F): --  HR: 84 (10-28-21 @ 21:12) (78 - 87)  BP: 125/69 (10-28-21 @ 21:12) (119/57 - 128/67)  BP(mean): 87 (10-28-21 @ 21:12) (87 - 95)  RR: 12 (10-28-21 @ 21:12) (12 - 14)  SpO2: 99% (10-28-21 @ 21:12) (98% - 99%)  I&O's Summary    28 Oct 2021 07:01  -  28 Oct 2021 21:20  --------------------------------------------------------  IN: 0 mL / OUT: 700 mL / NET: -700 mL        Physical Exam:  General: Pt Alert and oriented, NAD  DSG aquacell C/D/I  Pulses: 2+dp, pt pulses, toes wwp, cap refill <3 sec  Sensation: SILT in sural/saph/sp/dp/tibial distributions  Motor: EHL/FHL/TA/GS 5/5                          12.0   9.48  )-----------( 221      ( 28 Oct 2021 06:19 )             37.9     10-28    141  |  108  |  27<H>  ----------------------------<  133<H>  4.6   |  24  |  1.10    Ca    9.3      28 Oct 2021 06:19        Post-op X-Ray:   Xray L hip: hardware in place maintaining correct aligment    A/P: 76yFemale POD#0 s/p L hip mireya by Dr. Loja on 10/28  - Stable  - Pain Control  - f/u AM labs  - DVT ppx:  BID  - Post op abx: periop ancef  - PT, WBS: WBAT LLE with posterior hip precautions  - Dispo: pending PT veronica Torres, PGY-1  Ortho Pager [phone]

## 2021-10-29 ENCOUNTER — TRANSCRIPTION ENCOUNTER (OUTPATIENT)
Age: 76
End: 2021-10-29

## 2021-10-29 LAB
24R-OH-CALCIDIOL SERPL-MCNC: 35.4 NG/ML — SIGNIFICANT CHANGE UP (ref 30–80)
ANION GAP SERPL CALC-SCNC: 10 MMOL/L — SIGNIFICANT CHANGE UP (ref 5–17)
ANION GAP SERPL CALC-SCNC: 9 MMOL/L — SIGNIFICANT CHANGE UP (ref 5–17)
BUN SERPL-MCNC: 31 MG/DL — HIGH (ref 7–23)
BUN SERPL-MCNC: 34 MG/DL — HIGH (ref 7–23)
CALCIUM SERPL-MCNC: 8.5 MG/DL — SIGNIFICANT CHANGE UP (ref 8.4–10.5)
CALCIUM SERPL-MCNC: 8.7 MG/DL — SIGNIFICANT CHANGE UP (ref 8.4–10.5)
CALCIUM SERPL-MCNC: 9.1 MG/DL — SIGNIFICANT CHANGE UP (ref 8.4–10.5)
CHLORIDE SERPL-SCNC: 103 MMOL/L — SIGNIFICANT CHANGE UP (ref 96–108)
CHLORIDE SERPL-SCNC: 106 MMOL/L — SIGNIFICANT CHANGE UP (ref 96–108)
CO2 SERPL-SCNC: 23 MMOL/L — SIGNIFICANT CHANGE UP (ref 22–31)
CO2 SERPL-SCNC: 24 MMOL/L — SIGNIFICANT CHANGE UP (ref 22–31)
CREAT SERPL-MCNC: 1.62 MG/DL — HIGH (ref 0.5–1.3)
CREAT SERPL-MCNC: 1.63 MG/DL — HIGH (ref 0.5–1.3)
GLUCOSE SERPL-MCNC: 115 MG/DL — HIGH (ref 70–99)
GLUCOSE SERPL-MCNC: 170 MG/DL — HIGH (ref 70–99)
HCT VFR BLD CALC: 35.1 % — SIGNIFICANT CHANGE UP (ref 34.5–45)
HGB BLD-MCNC: 11 G/DL — LOW (ref 11.5–15.5)
MCHC RBC-ENTMCNC: 27.8 PG — SIGNIFICANT CHANGE UP (ref 27–34)
MCHC RBC-ENTMCNC: 31.3 GM/DL — LOW (ref 32–36)
MCV RBC AUTO: 88.6 FL — SIGNIFICANT CHANGE UP (ref 80–100)
NRBC # BLD: 0 /100 WBCS — SIGNIFICANT CHANGE UP (ref 0–0)
PLATELET # BLD AUTO: 165 K/UL — SIGNIFICANT CHANGE UP (ref 150–400)
POTASSIUM SERPL-MCNC: 4.6 MMOL/L — SIGNIFICANT CHANGE UP (ref 3.5–5.3)
POTASSIUM SERPL-MCNC: 4.6 MMOL/L — SIGNIFICANT CHANGE UP (ref 3.5–5.3)
POTASSIUM SERPL-SCNC: 4.6 MMOL/L — SIGNIFICANT CHANGE UP (ref 3.5–5.3)
POTASSIUM SERPL-SCNC: 4.6 MMOL/L — SIGNIFICANT CHANGE UP (ref 3.5–5.3)
PTH-INTACT FLD-MCNC: 82 PG/ML — HIGH (ref 15–65)
RBC # BLD: 3.96 M/UL — SIGNIFICANT CHANGE UP (ref 3.8–5.2)
RBC # FLD: 14.2 % — SIGNIFICANT CHANGE UP (ref 10.3–14.5)
SODIUM SERPL-SCNC: 137 MMOL/L — SIGNIFICANT CHANGE UP (ref 135–145)
SODIUM SERPL-SCNC: 138 MMOL/L — SIGNIFICANT CHANGE UP (ref 135–145)
T4 FREE SERPL-MCNC: 1.29 NG/DL — SIGNIFICANT CHANGE UP (ref 0.93–1.7)
WBC # BLD: 9.73 K/UL — SIGNIFICANT CHANGE UP (ref 3.8–10.5)
WBC # FLD AUTO: 9.73 K/UL — SIGNIFICANT CHANGE UP (ref 3.8–10.5)

## 2021-10-29 PROCEDURE — 99233 SBSQ HOSP IP/OBS HIGH 50: CPT

## 2021-10-29 RX ORDER — SELEGILINE HYDROCHLORIDE 1.25 MG/1
10 TABLET, ORALLY DISINTEGRATING ORAL
Refills: 0 | Status: DISCONTINUED | OUTPATIENT
Start: 2021-10-30 | End: 2021-11-03

## 2021-10-29 RX ORDER — SELEGILINE HYDROCHLORIDE 1.25 MG/1
10 TABLET, ORALLY DISINTEGRATING ORAL EVERY 12 HOURS
Refills: 0 | Status: DISCONTINUED | OUTPATIENT
Start: 2021-10-29 | End: 2021-10-29

## 2021-10-29 RX ORDER — SELEGILINE HYDROCHLORIDE 1.25 MG/1
5 TABLET, ORALLY DISINTEGRATING ORAL ONCE
Refills: 0 | Status: COMPLETED | OUTPATIENT
Start: 2021-10-29 | End: 2021-10-29

## 2021-10-29 RX ORDER — SODIUM CHLORIDE 9 MG/ML
500 INJECTION INTRAMUSCULAR; INTRAVENOUS; SUBCUTANEOUS ONCE
Refills: 0 | Status: COMPLETED | OUTPATIENT
Start: 2021-10-29 | End: 2021-10-29

## 2021-10-29 RX ADMIN — SELEGILINE HYDROCHLORIDE 5 MILLIGRAM(S): 1.25 TABLET, ORALLY DISINTEGRATING ORAL at 16:32

## 2021-10-29 RX ADMIN — Medication 100 MILLIGRAM(S): at 23:38

## 2021-10-29 RX ADMIN — Medication 100 MILLIGRAM(S): at 16:19

## 2021-10-29 RX ADMIN — SENNA PLUS 2 TABLET(S): 8.6 TABLET ORAL at 22:27

## 2021-10-29 RX ADMIN — Medication 100 MILLIGRAM(S): at 00:39

## 2021-10-29 RX ADMIN — Medication 325 MILLIGRAM(S): at 06:43

## 2021-10-29 RX ADMIN — ATORVASTATIN CALCIUM 10 MILLIGRAM(S): 80 TABLET, FILM COATED ORAL at 06:43

## 2021-10-29 RX ADMIN — Medication 325 MILLIGRAM(S): at 19:01

## 2021-10-29 RX ADMIN — FAMOTIDINE 20 MILLIGRAM(S): 10 INJECTION INTRAVENOUS at 19:02

## 2021-10-29 RX ADMIN — Medication 975 MILLIGRAM(S): at 12:05

## 2021-10-29 RX ADMIN — Medication 100 MILLIGRAM(S): at 07:40

## 2021-10-29 RX ADMIN — SODIUM CHLORIDE 500 MILLILITER(S): 9 INJECTION INTRAMUSCULAR; INTRAVENOUS; SUBCUTANEOUS at 17:31

## 2021-10-29 RX ADMIN — Medication 975 MILLIGRAM(S): at 13:05

## 2021-10-29 RX ADMIN — FAMOTIDINE 20 MILLIGRAM(S): 10 INJECTION INTRAVENOUS at 06:44

## 2021-10-29 RX ADMIN — SELEGILINE HYDROCHLORIDE 5 MILLIGRAM(S): 1.25 TABLET, ORALLY DISINTEGRATING ORAL at 06:44

## 2021-10-29 NOTE — DISCHARGE NOTE PROVIDER - HOSPITAL COURSE
Admitted  Surgery  Tala-op Antibiotics  Pain control  DVT prophylaxis  OOB/Physical Therapy Admitted to Orthopedics for hip fracture   Medical clearance   Surgery for left hip fracture, for left hip mireya   Tala-op Antibiotics  Pain control  Holloway for failed trial of void POD 2- POD  DVT prophylaxis  OOB/Physical Therapy Admitted to Orthopedics for hip fracture   Internal medicine consulted for medical optimization for surgery  Surgery for left hip fracture - left hip hemiarthroplasty on 10/28/2021  Tala-op Antibiotics  Pain control  Holloway placed for urinary retention   DVT prophylaxis  OOB/Physical Therapy

## 2021-10-29 NOTE — PHYSICAL THERAPY INITIAL EVALUATION ADULT - ADDITIONAL COMMENTS
Prior to fall, pt lives in elevator building. Pt has parkinsons, however, pt was working with a  4x per week. Pt very independent despite neurological diagnosis. Pt denies use of any DME prior to admission

## 2021-10-29 NOTE — OCCUPATIONAL THERAPY INITIAL EVALUATION ADULT - MODALITIES TREATMENT COMMENTS
Pt able to tolerate standing ~5 mins with Mod Ax2 with RW and took 1 side stepping however limited for further ambulation 2/2 pt reports her legs "feel like 100 lbs".

## 2021-10-29 NOTE — DISCHARGE NOTE PROVIDER - NSDCACTIVITY_GEN_ALL_CORE
No restrictions/Do not drive or operate machinery/Do not make important decisions/Walking - Indoors allowed No restrictions/Do not drive or operate machinery/Showering allowed/Do not make important decisions/Walking - Indoors allowed/No heavy lifting/straining/Follow Instructions Provided by your Surgical Team

## 2021-10-29 NOTE — DISCHARGE NOTE PROVIDER - NSDCFUADDAPPT_GEN_ALL_CORE_FT
You should follow up with the outpatient genitourinary clinic within 1-2 weeks for your silva catheter. You can call [phone] to schedule an appointment. The office is located at [address] [place] [address].

## 2021-10-29 NOTE — DISCHARGE NOTE PROVIDER - NSDCFUADDINST_GEN_ALL_CORE_FT
Weight bear as tolerated with assistive device.    No strenuous activity, heavy lifting, driving or returning to work until cleared by MD.    You may shower - dressing is water-resistant, no soaking in bathtubs.    Remove dressing after post op day 5-7, then leave incision open to air. Keep incision clean and dry.    Try to have regular bowel movements, take stool softener or laxative if necessary.    May take Pepcid or Prilosec for upset stomach.    May take Aleve or Naproxen if needed.    Do not apply any creams or ointments on the incision or around the incision/dressing.    Swelling may travel all the way down leg to foot, this is normal and will subside in a few weeks.    Call to schedule an appt with  _________ for follow up, if you have staples or sutures they will be removed in office.    Contact your doctor if you experience: fever greater than 101.5, chills, chest pain, difficulty breathing, redness or excessive drainage around the incision, other concerns.    Follow up with your primary care provider.    Your creatinine and bun were elevated during this admission. These values reflect kidney function. They have improved since admission. Continue to monitor them with your PCP  Weight bear as tolerated with assistive device.    No strenuous activity, heavy lifting, driving or returning to work until cleared by MD.    You may shower - dressing is water-resistant, no soaking in bathtubs.    Remove dressing after post op day 5-7, then leave incision open to air. Keep incision clean and dry.    Try to have regular bowel movements, take stool softener or laxative if necessary.    May take Pepcid or Prilosec for upset stomach.    May take Aleve or Naproxen if needed.    Do not apply any creams or ointments on the incision or around the incision/dressing.    Swelling may travel all the way down leg to foot, this is normal and will subside in a few weeks.    Call to schedule an appt with Dr. Loja for follow up, if you have staples or sutures they will be removed in office.    Contact your doctor if you experience: fever greater than 101.5, chills, chest pain, difficulty breathing, redness or excessive drainage around the incision, other concerns.    Follow up with your primary care provider.    Your creatinine and bun were elevated during this admission. These values reflect kidney function. They have improved since admission. Continue to monitor them with your PCP  Weight bear as tolerated with assistive device.  No strenuous activity, heavy lifting, driving or returning to work until cleared by MD.  You may shower - dressing is water-resistant, no soaking in bathtubs.  Remove dressing after post op day 5-7, then leave incision open to air. Keep incision clean and dry.  Try to have regular bowel movements, take stool softener or laxative if necessary.  May take Pepcid or Prilosec for upset stomach.  Do not apply any creams or ointments on the incision or around the incision/dressing.  Swelling may travel all the way down leg to foot, this is normal and will subside in a few weeks.  Call to schedule an appt with Dr. Pratt for follow up, if you have staples or sutures they will be removed in office.  Contact your doctor if you experience: fever greater than 101.5, chills, chest pain, difficulty breathing, redness or excessive drainage around the incision, other concerns.  Follow up with your primary care provider. Your creatinine and bun were elevated during this admission. These values reflect kidney function. They have improved since admission. Continue to monitor them with your PCP. You also have a silva catheter due to urinary retention.

## 2021-10-29 NOTE — OCCUPATIONAL THERAPY INITIAL EVALUATION ADULT - MD ORDER
76F w HLD, Parkinson's disease, p/w ground level fall onto L hip found to have Displaced L FNF s/p L hemiarthroplasty 10/28 w Dr. Loja

## 2021-10-29 NOTE — OCCUPATIONAL THERAPY INITIAL EVALUATION ADULT - GENERAL OBSERVATIONS, REHAB EVAL
Pt received semisupine in bed NAD, +IVL, abd pillow, + 3L via NC, + abd pillow, + L hip dressing C/I/D.

## 2021-10-29 NOTE — OCCUPATIONAL THERAPY INITIAL EVALUATION ADULT - DIAGNOSIS, OT EVAL
Pt p/w PMHx PD demonstrating decrease in balance, strength and ROM affecting ADLs and functional mobility.

## 2021-10-29 NOTE — DISCHARGE NOTE PROVIDER - CARE PROVIDER_API CALL
Doug Loja  ORTHOPAEDIC SURGERY  [address] [place] [id number]  Phone: [phone]  Fax: [phone]  Follow Up Time: 1 week

## 2021-10-29 NOTE — DISCHARGE NOTE PROVIDER - NSDCMRMEDTOKEN_GEN_ALL_CORE_FT
lactulose 10 g/15 mL oral syrup: 15 milliliter(s) orally 2 times a day  Lipitor 10 mg oral tablet: 1 tab(s) orally once a day  miSOPROStol 100 mcg oral tablet: 1 tab(s) orally 4 times a day  selegiline 5 mg oral tablet: 1 tab(s) orally 2 times a day   acetaminophen 325 mg oral tablet: 2 tab(s) orally every 6 hours, As Needed for mild pain  aspirin 325 mg oral delayed release tablet: 1 tab(s) orally 2 times a day  cefpodoxime 200 mg oral tablet: 1 tab(s) orally every 12 hours x 3 days (until 11/6)  famotidine 20 mg oral tablet: 1 tab(s) orally every 12 hours  Lipitor 10 mg oral tablet: 1 tab(s) orally once a day  oxyCODONE 10 mg oral tablet: 1 tab(s) orally every 4 hours, As needed, Severe Pain (7 - 10)  oxyCODONE 5 mg oral tablet: 1 tab(s) orally every 4 hours, As needed, Moderate Pain (4 - 6)  selegiline 5 mg oral tablet: 1 tab(s) orally 2 times a day

## 2021-10-29 NOTE — PHYSICAL THERAPY INITIAL EVALUATION ADULT - DIAGNOSIS, PT EVAL
4G: Impaired Joint Mobility, Muscle Performance, and Range of Motion Associated with Fracture 4I: Impaired Joint Mobility, Motor Function, Muscle Performance, and Range of Motion Associated with Bony or Soft Tissue Surgery

## 2021-10-29 NOTE — DISCHARGE NOTE PROVIDER - NSDCCPCAREPLAN_GEN_ALL_CORE_FT
PRINCIPAL DISCHARGE DIAGNOSIS  Diagnosis: Hip fracture, left  Assessment and Plan of Treatment:

## 2021-10-29 NOTE — PROGRESS NOTE ADULT - ASSESSMENT
76F w HLD, Parkinson's disease, p/w ground level fall onto L hip found to have Displaced L FNF s/p L hemiarthroplasty 10/28 w Dr. Loja     #Post-op state - pain controlled. On ASA BID for PPx. On bowel regimen and incentive spirometer.   #TAMEKA - Cr 1.6 today from 1.1 Suspect hypovolemia/pre-renal etiology vs obstruction (pt was retaining 700cc prior to surgery).   #HLD - c/w home statin  #Parkinson's disease - c/w seleginline 10 daily   #L Hip fx - mgmt per orthopedics. pt has h/o osteopenia. see osteoporosis labs below   - 25,OH-D at target at 35.   #Constipation - takes laxatives 3-4/week. Was admitted in the past for impaction. Pt has BM every 1-2 days at baseline    Recommendations  -Obtain UA, Fractional excretion studies  -Encourage PO/Fluid intake  -AVOID NSAIDs in setting of TAMEKA  -BMP this afternoon; TOV once standing w PT  -Pending PT eval; f/u recs.     -in setting of Parkinson's disease, pt is at increased risk of delirium. Avoid benzodiazepines. AVOID haldol. Optimize pain, start bowel regimen. AVOID physicial restraints. Mobilize as tolerated post-operatively.     PMD: Dr. Clayton Flannery  DISPO: TBD pending PT, improvement in Cr/TAMEKA

## 2021-10-29 NOTE — DISCHARGE NOTE PROVIDER - NSDCHC_MEDRECSTATUS_GEN_ALL_CORE
Detail Level: Detailed Admission Reconciliation is Completed  Discharge Reconciliation is Not Complete Admission Reconciliation is Completed  Discharge Reconciliation is Completed

## 2021-10-30 LAB
ANION GAP SERPL CALC-SCNC: 10 MMOL/L — SIGNIFICANT CHANGE UP (ref 5–17)
BUN SERPL-MCNC: 31 MG/DL — HIGH (ref 7–23)
CALCIUM SERPL-MCNC: 8.8 MG/DL — SIGNIFICANT CHANGE UP (ref 8.4–10.5)
CHLORIDE SERPL-SCNC: 104 MMOL/L — SIGNIFICANT CHANGE UP (ref 96–108)
CO2 SERPL-SCNC: 22 MMOL/L — SIGNIFICANT CHANGE UP (ref 22–31)
CREAT SERPL-MCNC: 1.39 MG/DL — HIGH (ref 0.5–1.3)
GLUCOSE SERPL-MCNC: 112 MG/DL — HIGH (ref 70–99)
HCT VFR BLD CALC: 34.1 % — LOW (ref 34.5–45)
HGB BLD-MCNC: 11 G/DL — LOW (ref 11.5–15.5)
MCHC RBC-ENTMCNC: 28.5 PG — SIGNIFICANT CHANGE UP (ref 27–34)
MCHC RBC-ENTMCNC: 32.3 GM/DL — SIGNIFICANT CHANGE UP (ref 32–36)
MCV RBC AUTO: 88.3 FL — SIGNIFICANT CHANGE UP (ref 80–100)
NRBC # BLD: 0 /100 WBCS — SIGNIFICANT CHANGE UP (ref 0–0)
PLATELET # BLD AUTO: 160 K/UL — SIGNIFICANT CHANGE UP (ref 150–400)
POTASSIUM SERPL-MCNC: 4.2 MMOL/L — SIGNIFICANT CHANGE UP (ref 3.5–5.3)
POTASSIUM SERPL-SCNC: 4.2 MMOL/L — SIGNIFICANT CHANGE UP (ref 3.5–5.3)
RBC # BLD: 3.86 M/UL — SIGNIFICANT CHANGE UP (ref 3.8–5.2)
RBC # FLD: 14.2 % — SIGNIFICANT CHANGE UP (ref 10.3–14.5)
SODIUM SERPL-SCNC: 136 MMOL/L — SIGNIFICANT CHANGE UP (ref 135–145)
WBC # BLD: 12.18 K/UL — HIGH (ref 3.8–10.5)
WBC # FLD AUTO: 12.18 K/UL — HIGH (ref 3.8–10.5)

## 2021-10-30 PROCEDURE — 99233 SBSQ HOSP IP/OBS HIGH 50: CPT

## 2021-10-30 PROCEDURE — 71045 X-RAY EXAM CHEST 1 VIEW: CPT | Mod: 26

## 2021-10-30 RX ORDER — CALCIUM CARBONATE 500(1250)
1 TABLET ORAL EVERY 4 HOURS
Refills: 0 | Status: DISCONTINUED | OUTPATIENT
Start: 2021-10-30 | End: 2021-11-03

## 2021-10-30 RX ADMIN — FAMOTIDINE 20 MILLIGRAM(S): 10 INJECTION INTRAVENOUS at 06:43

## 2021-10-30 RX ADMIN — Medication 1 TABLET(S): at 13:57

## 2021-10-30 RX ADMIN — FAMOTIDINE 20 MILLIGRAM(S): 10 INJECTION INTRAVENOUS at 17:00

## 2021-10-30 RX ADMIN — Medication 325 MILLIGRAM(S): at 17:00

## 2021-10-30 RX ADMIN — ATORVASTATIN CALCIUM 10 MILLIGRAM(S): 80 TABLET, FILM COATED ORAL at 21:45

## 2021-10-30 RX ADMIN — SODIUM CHLORIDE 100 MILLILITER(S): 9 INJECTION, SOLUTION INTRAVENOUS at 09:54

## 2021-10-30 RX ADMIN — Medication 30 MILLILITER(S): at 12:54

## 2021-10-30 RX ADMIN — Medication 100 MILLIGRAM(S): at 07:43

## 2021-10-30 RX ADMIN — ATORVASTATIN CALCIUM 10 MILLIGRAM(S): 80 TABLET, FILM COATED ORAL at 06:44

## 2021-10-30 RX ADMIN — SELEGILINE HYDROCHLORIDE 10 MILLIGRAM(S): 1.25 TABLET, ORALLY DISINTEGRATING ORAL at 12:55

## 2021-10-30 RX ADMIN — Medication 325 MILLIGRAM(S): at 06:43

## 2021-10-30 NOTE — PROGRESS NOTE ADULT - SUBJECTIVE AND OBJECTIVE BOX
INTERVAL HPI/OVERNIGHT EVENTS: PRAVEEN O/N    SUBJECTIVE: Patient seen and examined at bedside.   Pt feels pain in leg is controlled. Reports having pleuritic chest discomfort yesterday which is improved today. No numbness. No fever, chest pain, dyspnea. Eating well. +Flatus wo BM. Holloway remains in place.       OBJECTIVE:    VITAL SIGNS:  ICU Vital Signs Last 24 Hrs  T(C): 37.4 (30 Oct 2021 17:19), Max: 37.4 (30 Oct 2021 17:19)  T(F): 99.4 (30 Oct 2021 17:19), Max: 99.4 (30 Oct 2021 17:19)  HR: 105 (30 Oct 2021 19:06) (97 - 114)  BP: 134/83 (30 Oct 2021 19:06) (123/76 - 167/84)  BP(mean): 100 (30 Oct 2021 04:19) (91 - 100)  ABP: --  ABP(mean): --  RR: 16 (30 Oct 2021 17:19) (16 - 17)  SpO2: 96% (30 Oct 2021 17:19) (94% - 96%)        10-29 @ 07:01  -  10-30 @ 07:00  --------------------------------------------------------  IN: 0 mL / OUT: 600 mL / NET: -600 mL      CAPILLARY BLOOD GLUCOSE          PHYSICAL EXAM:  GEN: female in NAD on RA  HEENT: NC/AT, MMM  NECK: Supple  CV: RRR, nml S1S2, no murmurs  PULM: nml effort, CTAB wo rales, rhonchi, or weakness  ABD: Soft, non-distended, NABS, non-tender  NEURO  A/O x3, moving all extremities.   5/5 in BUE.  L lateral thigh dressing c/d/i. Decreased strength in L hip and knee 2/2 pain. L plantarflex/ext 5/5.   PSYCH: Appropriate    MEDICATIONS:  MEDICATIONS  (STANDING):  aspirin enteric coated 325 milliGRAM(s) Oral two times a day  atorvastatin 10 milliGRAM(s) Oral at bedtime  chlorhexidine 2% Cloths 1 Application(s) Topical once  famotidine    Tablet 20 milliGRAM(s) Oral every 12 hours  influenza  Vaccine (HIGH DOSE) 0.7 milliLiter(s) IntraMuscular once  lactated ringers. 1000 milliLiter(s) (100 mL/Hr) IV Continuous <Continuous>  povidone iodine 5% Nasal Swab 1 Application(s) Both Nostrils once  selegiline Oral Tab/Cap 10 milliGRAM(s) Oral <User Schedule>  senna 2 Tablet(s) Oral at bedtime    MEDICATIONS  (PRN):  acetaminophen     Tablet .. 975 milliGRAM(s) Oral every 8 hours PRN Mild Pain (1 - 3)  aluminum hydroxide/magnesium hydroxide/simethicone Suspension 30 milliLiter(s) Oral four times a day PRN Indigestion  calcium carbonate    500 mG (Tums) Chewable 1 Tablet(s) Chew every 4 hours PRN Indigestion  HYDROmorphone  Injectable 0.5 milliGRAM(s) IV Push every 4 hours PRN breakthrough pain  ondansetron Injectable 4 milliGRAM(s) IV Push every 6 hours PRN Nausea and/or Vomiting  oxyCODONE    IR 10 milliGRAM(s) Oral every 4 hours PRN Severe Pain (7 - 10)  oxyCODONE    IR 5 milliGRAM(s) Oral every 4 hours PRN Moderate Pain (4 - 6)      ALLERGIES:  Allergies    penicillin (Swelling)  sulfa drugs (Nausea)    Intolerances        LABS:                        11.0   12.18 )-----------( 160      ( 30 Oct 2021 07:19 )             34.1     10-30    136  |  104  |  31<H>  ----------------------------<  112<H>  4.2   |  22  |  1.39<H>    Ca    8.8      30 Oct 2021 07:19            RADIOLOGY & ADDITIONAL TESTS: Reviewed.

## 2021-10-30 NOTE — PROGRESS NOTE ADULT - ASSESSMENT
76F w HLD, Parkinson's disease, p/w ground level fall onto L hip found to have Displaced L FNF s/p L hemiarthroplasty 10/28 w Dr. Loja     #Post-op state - pain controlled. On ASA BID for PPx. On bowel regimen and incentive spirometer.   #TAMEKA - Cr 1.39 today from 1.6. Suspect hypovolemia/pre-renal etiology vs obstruction (pt was retaining 700cc prior to surgery).   #HLD - c/w home statin  #Parkinson's disease - c/w seleginline 10 daily   #L Hip fx - mgmt per orthopedics. pt has h/o osteopenia. see osteoporosis labs below   - 25,OH-D at target at 35.   #Constipation - takes laxatives 3-4/week. Was admitted in the past for impaction. Pt has BM every 1-2 days at baseline    Recommendations  Overall. Renal function improving. Perform TOV today  Q6h Bladder scans  Obtain portable CXR in setting of borderline tachycardia and improving R pleuritic chest pain.   -AVOID NSAIDs in setting of TAMEKA    -in setting of Parkinson's disease, pt is at increased risk of delirium. Avoid benzodiazepines. AVOID haldol. Optimize pain, start bowel regimen. AVOID physicial restraints. Mobilize as tolerated post-operatively.     PMD: Dr. Clayton Flannery  DISPO: Acute rehab. Pending TOV. Acceptance for rehab and possible auth. Anticipate DC MON-TUE

## 2021-10-31 LAB
ANION GAP SERPL CALC-SCNC: 11 MMOL/L — SIGNIFICANT CHANGE UP (ref 5–17)
BUN SERPL-MCNC: 26 MG/DL — HIGH (ref 7–23)
CALCIUM SERPL-MCNC: 8.8 MG/DL — SIGNIFICANT CHANGE UP (ref 8.4–10.5)
CHLORIDE SERPL-SCNC: 104 MMOL/L — SIGNIFICANT CHANGE UP (ref 96–108)
CO2 SERPL-SCNC: 22 MMOL/L — SIGNIFICANT CHANGE UP (ref 22–31)
CREAT SERPL-MCNC: 1.05 MG/DL — SIGNIFICANT CHANGE UP (ref 0.5–1.3)
GLUCOSE SERPL-MCNC: 113 MG/DL — HIGH (ref 70–99)
HCT VFR BLD CALC: 33.8 % — LOW (ref 34.5–45)
HGB BLD-MCNC: 11.3 G/DL — LOW (ref 11.5–15.5)
MCHC RBC-ENTMCNC: 28.4 PG — SIGNIFICANT CHANGE UP (ref 27–34)
MCHC RBC-ENTMCNC: 33.4 GM/DL — SIGNIFICANT CHANGE UP (ref 32–36)
MCV RBC AUTO: 84.9 FL — SIGNIFICANT CHANGE UP (ref 80–100)
NRBC # BLD: 0 /100 WBCS — SIGNIFICANT CHANGE UP (ref 0–0)
PLATELET # BLD AUTO: 193 K/UL — SIGNIFICANT CHANGE UP (ref 150–400)
POTASSIUM SERPL-MCNC: 4.2 MMOL/L — SIGNIFICANT CHANGE UP (ref 3.5–5.3)
POTASSIUM SERPL-SCNC: 4.2 MMOL/L — SIGNIFICANT CHANGE UP (ref 3.5–5.3)
RBC # BLD: 3.98 M/UL — SIGNIFICANT CHANGE UP (ref 3.8–5.2)
RBC # FLD: 14 % — SIGNIFICANT CHANGE UP (ref 10.3–14.5)
SODIUM SERPL-SCNC: 137 MMOL/L — SIGNIFICANT CHANGE UP (ref 135–145)
WBC # BLD: 13.54 K/UL — HIGH (ref 3.8–10.5)
WBC # FLD AUTO: 13.54 K/UL — HIGH (ref 3.8–10.5)

## 2021-10-31 PROCEDURE — 99232 SBSQ HOSP IP/OBS MODERATE 35: CPT

## 2021-10-31 RX ADMIN — Medication 325 MILLIGRAM(S): at 17:27

## 2021-10-31 RX ADMIN — ATORVASTATIN CALCIUM 10 MILLIGRAM(S): 80 TABLET, FILM COATED ORAL at 21:16

## 2021-10-31 RX ADMIN — ONDANSETRON 4 MILLIGRAM(S): 8 TABLET, FILM COATED ORAL at 23:25

## 2021-10-31 RX ADMIN — SELEGILINE HYDROCHLORIDE 10 MILLIGRAM(S): 1.25 TABLET, ORALLY DISINTEGRATING ORAL at 11:14

## 2021-10-31 RX ADMIN — Medication 325 MILLIGRAM(S): at 05:28

## 2021-10-31 RX ADMIN — Medication 1 TABLET(S): at 05:28

## 2021-10-31 NOTE — PROGRESS NOTE ADULT - ASSESSMENT
76F w HLD, Parkinson's disease, p/w ground level fall onto L hip found to have Displaced L FNF s/p L hemiarthroplasty 10/28 w Dr. Loja - complicated by continued urinary retention - silva replaced 10/30    #Post-op state - pain controlled. On ASA BID for PPx. On bowel regimen and incentive spirometer.   #TAMEKA - Continues to improve. Now down to 1.05 from 1.39 today from 1.6. Suspect hypovolemia/pre-renal etiology vs obstruction (pt was retaining 700cc prior to surgery)  #Acute urinary retention - likely 2/2 post-op state. Continue to mobilize;   #HLD - c/w home statin  #Parkinson's disease - c/w seleginline 10 daily   #L Hip fx - mgmt per orthopedics. pt has h/o osteopenia. see osteoporosis labs below   - 25,OH-D at target at 35.   #Constipation - takes laxatives 3-4/week. Was admitted in the past for impaction. Pt has BM every 1-2 days at baseline  #Borderline tachycardia - 90-100s. Pt asymptomatic. Satting well on room air - will continue to encourage incentive spirometer use.     Recommendations  Overall. Renal function improving.   Obtain portable CXR in setting of borderline tachycardia and improving R pleuritic chest pain.   May reattempt TOV if pt becomes more mobile. Pt reports retention has occurred in the past    -Please obtain CBC w diff in AM; also CXR in AM to assess possible R midlung infiltrate seen 10/30  -AVOID NSAIDs in setting of TAMEKA    -in setting of Parkinson's disease, pt is at increased risk of delirium. Avoid benzodiazepines. AVOID haldol. Optimize pain, start bowel regimen. AVOID physicial restraints. Mobilize as tolerated post-operatively.     PMD: Dr. Clayton Flannery  DISPO: Acute rehab.Acceptance for rehab and possible auth. Anticipate DC MON-TUE

## 2021-10-31 NOTE — PROGRESS NOTE ADULT - SUBJECTIVE AND OBJECTIVE BOX
INTERVAL HPI/OVERNIGHT EVENTS: PRAVEEN O/N    SUBJECTIVE: Patient seen and examined at bedside.   Pt felt tired after PT today. Had to have silva replaced due to continued retention. No fever, cough, dyspnea. Reports some R sided chest discomfort that comes and go w deep breathing. Eating wo nausea, abd pain. +flatus wo BM. Pain in L leg is controlled    OBJECTIVE:    VITAL SIGNS:  ICU Vital Signs Last 24 Hrs  T(C): 36.8 (31 Oct 2021 15:42), Max: 37.1 (30 Oct 2021 20:29)  T(F): 98.2 (31 Oct 2021 15:42), Max: 98.8 (30 Oct 2021 20:29)  HR: 106 (31 Oct 2021 15:42) (99 - 109)  BP: 149/79 (31 Oct 2021 15:42) (96/65 - 149/79)  BP(mean): --  ABP: --  ABP(mean): --  RR: 16 (31 Oct 2021 15:42) (16 - 17)  SpO2: 96% (31 Oct 2021 15:42) (95% - 96%)        10-30 @ 07:01  -  10-31 @ 07:00  --------------------------------------------------------  IN: 480 mL / OUT: 1325 mL / NET: -845 mL      CAPILLARY BLOOD GLUCOSE          PHYSICAL EXAM:  GEN: female in NAD on RA  HEENT: NC/AT, MMM  NECK: Supple  CV: RRR, nml S1S2, no murmurs  PULM: nml effort, CTAB wo rales, rhonchi, or weakness  ABD: Soft, non-distended, NABS, non-tender  NEURO  A/O x3, moving all extremities.   5/5 in BUE.  L lateral thigh dressing c/d/i. Decreased strength in L hip and knee 2/2 pain. L plantarflex/ext 5/5.   PSYCH: Appropriate    MEDICATIONS:  MEDICATIONS  (STANDING):  aspirin enteric coated 325 milliGRAM(s) Oral two times a day  atorvastatin 10 milliGRAM(s) Oral at bedtime  chlorhexidine 2% Cloths 1 Application(s) Topical once  famotidine    Tablet 20 milliGRAM(s) Oral every 12 hours  influenza  Vaccine (HIGH DOSE) 0.7 milliLiter(s) IntraMuscular once  lactated ringers. 1000 milliLiter(s) (100 mL/Hr) IV Continuous <Continuous>  povidone iodine 5% Nasal Swab 1 Application(s) Both Nostrils once  selegiline Oral Tab/Cap 10 milliGRAM(s) Oral <User Schedule>  senna 2 Tablet(s) Oral at bedtime    MEDICATIONS  (PRN):  acetaminophen     Tablet .. 975 milliGRAM(s) Oral every 8 hours PRN Mild Pain (1 - 3)  aluminum hydroxide/magnesium hydroxide/simethicone Suspension 30 milliLiter(s) Oral four times a day PRN Indigestion  calcium carbonate    500 mG (Tums) Chewable 1 Tablet(s) Chew every 4 hours PRN Indigestion  HYDROmorphone  Injectable 0.5 milliGRAM(s) IV Push every 4 hours PRN breakthrough pain  ondansetron Injectable 4 milliGRAM(s) IV Push every 6 hours PRN Nausea and/or Vomiting  oxyCODONE    IR 10 milliGRAM(s) Oral every 4 hours PRN Severe Pain (7 - 10)  oxyCODONE    IR 5 milliGRAM(s) Oral every 4 hours PRN Moderate Pain (4 - 6)      ALLERGIES:  Allergies    penicillin (Swelling)  sulfa drugs (Nausea)    Intolerances        LABS:                        11.3   13.54 )-----------( 193      ( 31 Oct 2021 06:41 )             33.8     10-31    137  |  104  |  26<H>  ----------------------------<  113<H>  4.2   |  22  |  1.05    Ca    8.8      31 Oct 2021 06:41            RADIOLOGY & ADDITIONAL TESTS: Reviewed.

## 2021-10-31 NOTE — PROGRESS NOTE ADULT - SUBJECTIVE AND OBJECTIVE BOX
Ortho Note    Pt comfortable without complaints, pain controlled  Denies CP, SOB, N/V, numbness/tingling     Vital Signs Last 24 Hrs  T(C): 37 (10-31-21 @ 08:25), Max: 37 (10-31-21 @ 08:25)  T(F): 98.6 (10-31-21 @ 08:25), Max: 98.6 (10-31-21 @ 08:25)  HR: 99 (10-31-21 @ 08:25) (99 - 100)  BP: 135/84 (10-31-21 @ 08:25) (135/84 - 148/83)  BP(mean): --  RR: 17 (10-31-21 @ 08:25) (16 - 17)  SpO2: 95% (10-31-21 @ 08:25) (95% - 96%)  AVSS    General: Pt Alert and oriented, NAD  L hip DSG C/D/I  Sensation intact s/s/sp/dp/t and symmetric   Motor: EHL/FHL/TA/GS 5/5 and symmetric   2+ DP pulse  Toes WWP      A/P: 76yFemale s/p L hip mireya    - Stable  - Pain Control  - DVT ppx: ASA  - PT, WBS: WBAT  - DC shira, f/u TOV  - Disp acute rehab     Ortho Pager [phone]

## 2021-11-01 LAB
ANION GAP SERPL CALC-SCNC: 10 MMOL/L — SIGNIFICANT CHANGE UP (ref 5–17)
ANISOCYTOSIS BLD QL: SLIGHT — SIGNIFICANT CHANGE UP
BASOPHILS # BLD AUTO: 0 K/UL — SIGNIFICANT CHANGE UP (ref 0–0.2)
BASOPHILS NFR BLD AUTO: 0 % — SIGNIFICANT CHANGE UP (ref 0–2)
BUN SERPL-MCNC: 29 MG/DL — HIGH (ref 7–23)
CALCIUM SERPL-MCNC: 8.8 MG/DL — SIGNIFICANT CHANGE UP (ref 8.4–10.5)
CHLORIDE SERPL-SCNC: 102 MMOL/L — SIGNIFICANT CHANGE UP (ref 96–108)
CO2 SERPL-SCNC: 23 MMOL/L — SIGNIFICANT CHANGE UP (ref 22–31)
CREAT SERPL-MCNC: 1.05 MG/DL — SIGNIFICANT CHANGE UP (ref 0.5–1.3)
EOSINOPHIL # BLD AUTO: 0 K/UL — SIGNIFICANT CHANGE UP (ref 0–0.5)
EOSINOPHIL NFR BLD AUTO: 0 % — SIGNIFICANT CHANGE UP (ref 0–6)
GIANT PLATELETS BLD QL SMEAR: PRESENT — SIGNIFICANT CHANGE UP
GLUCOSE SERPL-MCNC: 144 MG/DL — HIGH (ref 70–99)
HCT VFR BLD CALC: 34.5 % — SIGNIFICANT CHANGE UP (ref 34.5–45)
HGB BLD-MCNC: 11 G/DL — LOW (ref 11.5–15.5)
LYMPHOCYTES # BLD AUTO: 1.45 K/UL — SIGNIFICANT CHANGE UP (ref 1–3.3)
LYMPHOCYTES # BLD AUTO: 10.5 % — LOW (ref 13–44)
MANUAL SMEAR VERIFICATION: SIGNIFICANT CHANGE UP
MCHC RBC-ENTMCNC: 27.2 PG — SIGNIFICANT CHANGE UP (ref 27–34)
MCHC RBC-ENTMCNC: 31.9 GM/DL — LOW (ref 32–36)
MCV RBC AUTO: 85.4 FL — SIGNIFICANT CHANGE UP (ref 80–100)
MICROCYTES BLD QL: SLIGHT — SIGNIFICANT CHANGE UP
MONOCYTES # BLD AUTO: 0.48 K/UL — SIGNIFICANT CHANGE UP (ref 0–0.9)
MONOCYTES NFR BLD AUTO: 3.5 % — SIGNIFICANT CHANGE UP (ref 2–14)
NEUTROPHILS # BLD AUTO: 11.89 K/UL — HIGH (ref 1.8–7.4)
NEUTROPHILS NFR BLD AUTO: 85.1 % — HIGH (ref 43–77)
NEUTS BAND # BLD: 0.9 % — SIGNIFICANT CHANGE UP (ref 0–8)
OVALOCYTES BLD QL SMEAR: SLIGHT — SIGNIFICANT CHANGE UP
PLAT MORPH BLD: ABNORMAL
PLATELET # BLD AUTO: 212 K/UL — SIGNIFICANT CHANGE UP (ref 150–400)
POIKILOCYTOSIS BLD QL AUTO: SLIGHT — SIGNIFICANT CHANGE UP
POLYCHROMASIA BLD QL SMEAR: SLIGHT — SIGNIFICANT CHANGE UP
POTASSIUM SERPL-MCNC: 4.3 MMOL/L — SIGNIFICANT CHANGE UP (ref 3.5–5.3)
POTASSIUM SERPL-SCNC: 4.3 MMOL/L — SIGNIFICANT CHANGE UP (ref 3.5–5.3)
RBC # BLD: 4.04 M/UL — SIGNIFICANT CHANGE UP (ref 3.8–5.2)
RBC # FLD: 13.9 % — SIGNIFICANT CHANGE UP (ref 10.3–14.5)
RBC BLD AUTO: ABNORMAL
SODIUM SERPL-SCNC: 135 MMOL/L — SIGNIFICANT CHANGE UP (ref 135–145)
WBC # BLD: 13.83 K/UL — HIGH (ref 3.8–10.5)
WBC # FLD AUTO: 13.83 K/UL — HIGH (ref 3.8–10.5)

## 2021-11-01 PROCEDURE — 99233 SBSQ HOSP IP/OBS HIGH 50: CPT

## 2021-11-01 PROCEDURE — 71045 X-RAY EXAM CHEST 1 VIEW: CPT | Mod: 26

## 2021-11-01 RX ORDER — CEFTRIAXONE 500 MG/1
1000 INJECTION, POWDER, FOR SOLUTION INTRAMUSCULAR; INTRAVENOUS EVERY 24 HOURS
Refills: 0 | Status: DISCONTINUED | OUTPATIENT
Start: 2021-11-01 | End: 2021-11-03

## 2021-11-01 RX ADMIN — CEFTRIAXONE 100 MILLIGRAM(S): 500 INJECTION, POWDER, FOR SOLUTION INTRAMUSCULAR; INTRAVENOUS at 22:04

## 2021-11-01 RX ADMIN — Medication 325 MILLIGRAM(S): at 17:26

## 2021-11-01 RX ADMIN — Medication 325 MILLIGRAM(S): at 05:03

## 2021-11-01 RX ADMIN — SELEGILINE HYDROCHLORIDE 10 MILLIGRAM(S): 1.25 TABLET, ORALLY DISINTEGRATING ORAL at 11:43

## 2021-11-01 NOTE — DIETITIAN INITIAL EVALUATION ADULT. - PERTINENT MEDS FT
Gavin Long  Lipitor Pain regimen   Zofran   Pepcid  Lipitor  Mg hydroxide  Tums  Lactated ringers (100 mL/h)

## 2021-11-01 NOTE — PROGRESS NOTE ADULT - SUBJECTIVE AND OBJECTIVE BOX
INTERVAL HPI/OVERNIGHT EVENTS: PRAVEEN O/N    SUBJECTIVE: Patient seen and examined at bedside.   Pt reports intermittent chest discomfort has not recurred. Had diarrhea overnight - feels a bit bloated today. eating wo nausea, abd pain. Denies any LH/Dizziness, fever, chest pain, dyspnea.     Holloway remains in place    OBJECTIVE:    VITAL SIGNS:  ICU Vital Signs Last 24 Hrs  T(C): 36.5 (01 Nov 2021 08:19), Max: 37.4 (31 Oct 2021 20:11)  T(F): 97.7 (01 Nov 2021 08:19), Max: 99.4 (31 Oct 2021 20:11)  HR: 96 (01 Nov 2021 08:19) (96 - 109)  BP: 118/67 (01 Nov 2021 08:19) (96/65 - 149/79)  BP(mean): --  ABP: --  ABP(mean): --  RR: 18 (01 Nov 2021 08:19) (16 - 18)  SpO2: 97% (01 Nov 2021 08:19) (95% - 98%)        10-31 @ 07:01  -  11-01 @ 07:00  --------------------------------------------------------  IN: 480 mL / OUT: 1175 mL / NET: -695 mL      CAPILLARY BLOOD GLUCOSE          PHYSICAL EXAM:  GEN: female in NAD on RA  HEENT: NC/AT, MMM  NECK: Supple  CV: RRR, nml S1S2, no murmurs  PULM: nml effort, CTAB wo rales, rhonchi, or weakness  ABD: Soft, non-distended, NABS, non-tender  NEURO  A/O x3, moving all extremities.   5/5 in BUE.  L lateral thigh dressing c/d/i. Decreased strength in L hip and knee 2/2 pain. L plantarflex/ext 5/5.   PSYCH: Appropriate    MEDICATIONS:  MEDICATIONS  (STANDING):  aspirin enteric coated 325 milliGRAM(s) Oral two times a day  atorvastatin 10 milliGRAM(s) Oral at bedtime  chlorhexidine 2% Cloths 1 Application(s) Topical once  famotidine    Tablet 20 milliGRAM(s) Oral every 12 hours  influenza  Vaccine (HIGH DOSE) 0.7 milliLiter(s) IntraMuscular once  lactated ringers. 1000 milliLiter(s) (100 mL/Hr) IV Continuous <Continuous>  povidone iodine 5% Nasal Swab 1 Application(s) Both Nostrils once  selegiline Oral Tab/Cap 10 milliGRAM(s) Oral <User Schedule>    MEDICATIONS  (PRN):  acetaminophen     Tablet .. 975 milliGRAM(s) Oral every 8 hours PRN Mild Pain (1 - 3)  aluminum hydroxide/magnesium hydroxide/simethicone Suspension 30 milliLiter(s) Oral four times a day PRN Indigestion  calcium carbonate    500 mG (Tums) Chewable 1 Tablet(s) Chew every 4 hours PRN Indigestion  HYDROmorphone  Injectable 0.5 milliGRAM(s) IV Push every 4 hours PRN breakthrough pain  ondansetron Injectable 4 milliGRAM(s) IV Push every 6 hours PRN Nausea and/or Vomiting  oxyCODONE    IR 10 milliGRAM(s) Oral every 4 hours PRN Severe Pain (7 - 10)  oxyCODONE    IR 5 milliGRAM(s) Oral every 4 hours PRN Moderate Pain (4 - 6)      ALLERGIES:  Allergies    penicillin (Swelling)  sulfa drugs (Nausea)    Intolerances        LABS:                        11.0   13.83 )-----------( 212      ( 01 Nov 2021 05:46 )             34.5     11-01    135  |  102  |  29<H>  ----------------------------<  144<H>  4.3   |  23  |  1.05    Ca    8.8      01 Nov 2021 05:46            RADIOLOGY & ADDITIONAL TESTS: Reviewed.

## 2021-11-01 NOTE — DIETITIAN INITIAL EVALUATION ADULT. - ADD RECOMMEND
1. Monitor bowel movement 2. Monitor PO intake and tolerance 3. Adjust bowel movement regimen 1. Monitor bowel movement 2. Monitor PO intake and tolerance 3. Adjust bowel movement regimen 4. Provide additional education as needed

## 2021-11-01 NOTE — DIETITIAN INITIAL EVALUATION ADULT. - OTHER INFO
Pt is a 75 y/o female, seen resting in bed. PMH of Parkinson's and HLD. Admitted after ground level fall onto L hip, unable to bear weight or stand afterwards.     Pt was seen due to LOS. Currently on regular diet. Pt reported <25% of PO intake due to lack of appetite 2/2 surgery and diarrhea last night (10/31). Pt is on Senna. Planned to eat banana for breakfast. Reported feeling dehydrated.  No N/V/C or pain. PTA pt reported eating 3 meals/day consisting of vegetables and protein (i.e pork and beef), as well as snacks consisting of banana and chocolate. Pt reported no weight change with UBW of 61.8 lb. No edema, pressure injury observed. Please see full recommendations below. Pt is a 75 y/o female. PMH of Parkinson's and HLD. Admitted after ground level fall onto L hip, unable to bear weight or stand afterwards. Now admitted, s/p L hemiarthroplasty 10/28.  Per progress note, discharge plan: acute rehab. Referrals sent. No need for auth. Anticipate d/c mon-tue.     Pt was seen resting in bed. Currently on regular diet. Pt reported <50% of PO intake due to lack of appetite 2/2 surgery and diarrhea last night 10/31. Pt planned to eat banana and drink ginger ale for breakfast, reported feeling dehydrated. No N/V/C with last BM 11/01 - experienced several bouts of diarrhea overnight. Per flowsheet, surgical incision on left hip, Max Score of 19, no edema, pressure injury or pain noted.  PTA pt reported eating 3 meals/day consisting of vegetables and protein (i.e pork and beef), as well as snacks consisting of banana and chocolate. Pt reported no weight change with UBW of 136 lb, consistent with admitted weight. NKFA. RD continued to encourage pt to increase PO as tolerated and hydrate adequately. Please see full recommendations below.

## 2021-11-01 NOTE — PROGRESS NOTE ADULT - SUBJECTIVE AND OBJECTIVE BOX
Ortho Note    Surgery: Left hip hemiarthroplasty on 10/28 for Femoral neck fx    Patient seen and examined this AM  notes BM overnight  states sharp chest tightness resolved  denies pain at rest  Denies , SOB, N/V, numbness/tingling     Vital Signs Last 24 Hrs  T(C): --  T(F): --  HR: --  BP: 112/75 (11-01-21 @ 10:00) (112/75 - 112/75)  BP(mean): --  RR: --  SpO2: 97% (11-01-21 @ 10:00) (97% - 97%)  AVSS    General: Pt Alert and oriented, NAD  Dressing C/D/I: aquacel   Pulses: 2+   Sensation: intact to light touch   Motor: EHL/FHL/TA/GS firing bilaterally         A/P: 76yFemale POD#4 s/p Left hip hemiarthroplasty     - Leukocytosis- likely reactive post op, VSS, f/u AM labs tomorow  - d/c LEO silva today   - c/w pain Control PRN   - DVT ppx: ASA 325mg BID   - PT, WBS: WBAT, posterior hip precautions  - patient eval'd for acute rehab given PMHx parkinson's, pending acceptance     Ortho Pager [phone] Ortho Note    Surgery: Left hip hemiarthroplasty on 10/28 for Femoral neck fx    Patient seen and examined this AM  notes BM overnight  states sharp chest tightness resolved  denies pain at rest  Denies , SOB, N/V, numbness/tingling     Vital Signs Last 24 Hrs  T(C): --  T(F): --  HR: --  BP: 112/75 (11-01-21 @ 10:00) (112/75 - 112/75)  BP(mean): --  RR: --  SpO2: 97% (11-01-21 @ 10:00) (97% - 97%)  AVSS    General: Pt Alert and oriented, NAD  Dressing C/D/I: aquacel   Pulses: 2+   Sensation: intact to light touch   Motor: EHL/FHL/TA/GS firing bilaterally         A/P: 76yFemale POD#4 s/p Left hip hemiarthroplasty     - Leukocytosis- likely reactive post op, VSS, f/u AM labs tomorow  - d/c LEO silva today   - c/w pain Control PRN   - DVT ppx: ASA 325mg BID   - PT, WBS: WBAT, posterior hip precautions  - all stool softeners/laxatives d/c- patient with loose BMs over night   - CXR possible patchy right lung infiltrate: continue incentive spirometry  - patient eval'd for acute rehab given PMHx parkinson's, pending acceptance     Ortho Pager [phone] Ortho Note    Surgery: Left hip hemiarthroplasty on 10/28 for Femoral neck fx    Patient seen and examined this AM  notes BM overnight  states sharp chest tightness resolved  denies pain at rest  Denies , SOB, N/V, numbness/tingling     Vital Signs Last 24 Hrs  T(C): --  T(F): --  HR: --  BP: 112/75 (11-01-21 @ 10:00) (112/75 - 112/75)  BP(mean): --  RR: --  SpO2: 97% (11-01-21 @ 10:00) (97% - 97%)  AVSS    General: Pt Alert and oriented, NAD  Dressing C/D/I: aquacel   Pulses: 2+   Sensation: intact to light touch   Motor: EHL/FHL/TA/GS firing bilaterally         A/P: 76yFemale POD#4 s/p Left hip hemiarthroplasty     - Leukocytosis with infiltrate on CXR, discussed with Dr. Wilkerson- plan to start ceftriaxone for presumed CAP, 1g IV daily while inpatient (up to 5 days however not required in optimized for d/c prior) can dc on PO azithromycin 500mg once daily x3 days   - f/u AM labs    - d/c LEO silva today   - c/w pain Control PRN   - DVT ppx: ASA 325mg BID   - PT, WBS: WBAT, posterior hip precautions  - all stool softeners/laxatives d/c- patient with loose BMs over night   - patient eval'd for acute rehab given PMHx parkinson's, pending acceptance     Ortho Pager [phone]

## 2021-11-01 NOTE — PROGRESS NOTE ADULT - ASSESSMENT
76F w HLD, Parkinson's disease, p/w ground level fall onto L hip found to have Displaced L FNF s/p L hemiarthroplasty 10/28 w Dr. Loja - complicated by continued urinary retention - silva replaced 10/30    #Post-op state - pain controlled. On ASA BID for PPx. On bowel regimen and incentive spirometer.   #TAMEKA - Continues to improve. Stable at 1.05 from 1.39 today from 1.6. Suspect hypovolemia/pre-renal etiology vs obstruction (pt was retaining 700cc prior to surgery)  #Acute urinary retention - likely 2/2 post-op state. Continue to mobilize;   #HLD - c/w home statin  #Parkinson's disease - c/w seleginline 10 daily   #L Hip fx - mgmt per orthopedics. pt has h/o osteopenia. see osteoporosis labs below   - 25,OH-D at target at 35.   #Leukocytosis - likely reactive. Pt has been afebrile, non-toxic appearing. Continue to monitor.  #Constipation - BM 11/1.  takes laxatives 3-4/week. Was admitted in the past for impaction. Pt has BM every 1-2 days at baseline  #Borderline tachycardia - 90-100s. Pt asymptomatic. Satting well on room air - will continue to encourage incentive spirometer use.     Recommendations  Renal function stable  TOV today  AVOID NSAIDs in setting of TAMEKA    -in setting of Parkinson's disease, pt is at increased risk of delirium. Avoid benzodiazepines. AVOID haldol. Optimize pain, start bowel regimen. AVOID physicial restraints. Mobilize as tolerated post-operatively.     PMD: Dr. Clayton Flannery  DISPO: Acute rehab. Referrals sent. No need for Auth. Anticipate DC MON-TUE     76F w HLD, Parkinson's disease, p/w ground level fall onto L hip found to have Displaced L FNF s/p L hemiarthroplasty 10/28 w Dr. Loja - complicated by continued urinary retention - silva replaced 10/30    #Post-op state - pain controlled. On ASA BID for PPx. On bowel regimen and incentive spirometer.   #TAMEKA - Continues to improve. Stable at 1.05 from 1.39 today from 1.6. Suspect hypovolemia/pre-renal etiology vs obstruction (pt was retaining 700cc prior to surgery)  #Acute urinary retention - likely 2/2 post-op state. Continue to mobilize;   #HLD - c/w home statin  #Parkinson's disease - c/w seleginline 10 daily   #L Hip fx - mgmt per orthopedics. pt has h/o osteopenia. see osteoporosis labs below   - 25,OH-D at target at 35.   #Leukocytosis - in setting of borderline temp, R midlung opacity would cover for CAP  #Constipation - BM 11/1.  takes laxatives 3-4/week. Was admitted in the past for impaction. Pt has BM every 1-2 days at baseline  #Borderline tachycardia - 90-100s. Pt asymptomatic. Satting well on room air - will continue to encourage incentive spirometer use.     Recommendations  Renal function stable  TOV today  AVOID NSAIDs in setting of TAMEKA  CTX 1G x5d (transition to BID cefpodoxime on DC) and Azithromycin 500mg daily x3d for CAP    -in setting of Parkinson's disease, pt is at increased risk of delirium. Avoid benzodiazepines. AVOID haldol. Optimize pain, start bowel regimen. AVOID physicial restraints. Mobilize as tolerated post-operatively.     PMD: Dr. Clayton Flannery  DISPO: Acute rehab. Referrals sent. No need for Auth. Anticipate DC MON-TUE

## 2021-11-01 NOTE — DIETITIAN INITIAL EVALUATION ADULT. - OTHER CALCULATIONS
Used ABW to estimate needs as %IBW is between % (105%), adjusted for age Used ABW to estimate needs as %IBW is between % (105%), adjusted for age and wound healing

## 2021-11-02 DIAGNOSIS — G20 PARKINSON'S DISEASE: ICD-10-CM

## 2021-11-02 DIAGNOSIS — R53.2 FUNCTIONAL QUADRIPLEGIA: ICD-10-CM

## 2021-11-02 DIAGNOSIS — Z96.641 PRESENCE OF RIGHT ARTIFICIAL HIP JOINT: ICD-10-CM

## 2021-11-02 DIAGNOSIS — J18.9 PNEUMONIA, UNSPECIFIED ORGANISM: ICD-10-CM

## 2021-11-02 DIAGNOSIS — M81.0 AGE-RELATED OSTEOPOROSIS WITHOUT CURRENT PATHOLOGICAL FRACTURE: ICD-10-CM

## 2021-11-02 DIAGNOSIS — R33.8 OTHER RETENTION OF URINE: ICD-10-CM

## 2021-11-02 DIAGNOSIS — D72.829 ELEVATED WHITE BLOOD CELL COUNT, UNSPECIFIED: ICD-10-CM

## 2021-11-02 DIAGNOSIS — N17.9 ACUTE KIDNEY FAILURE, UNSPECIFIED: ICD-10-CM

## 2021-11-02 DIAGNOSIS — R19.7 DIARRHEA, UNSPECIFIED: ICD-10-CM

## 2021-11-02 LAB
ANION GAP SERPL CALC-SCNC: 10 MMOL/L — SIGNIFICANT CHANGE UP (ref 5–17)
APPEARANCE UR: CLEAR — SIGNIFICANT CHANGE UP
BACTERIA # UR AUTO: PRESENT /HPF
BILIRUB UR-MCNC: NEGATIVE — SIGNIFICANT CHANGE UP
BUN SERPL-MCNC: 38 MG/DL — HIGH (ref 7–23)
CALCIUM SERPL-MCNC: 8.8 MG/DL — SIGNIFICANT CHANGE UP (ref 8.4–10.5)
CHLORIDE SERPL-SCNC: 104 MMOL/L — SIGNIFICANT CHANGE UP (ref 96–108)
CO2 SERPL-SCNC: 24 MMOL/L — SIGNIFICANT CHANGE UP (ref 22–31)
COLOR SPEC: YELLOW — SIGNIFICANT CHANGE UP
CREAT SERPL-MCNC: 1.11 MG/DL — SIGNIFICANT CHANGE UP (ref 0.5–1.3)
DIFF PNL FLD: ABNORMAL
EPI CELLS # UR: SIGNIFICANT CHANGE UP /HPF (ref 0–5)
GLUCOSE SERPL-MCNC: 130 MG/DL — HIGH (ref 70–99)
GLUCOSE UR QL: NEGATIVE — SIGNIFICANT CHANGE UP
HCT VFR BLD CALC: 34.6 % — SIGNIFICANT CHANGE UP (ref 34.5–45)
HGB BLD-MCNC: 10.9 G/DL — LOW (ref 11.5–15.5)
KETONES UR-MCNC: NEGATIVE — SIGNIFICANT CHANGE UP
LEUKOCYTE ESTERASE UR-ACNC: ABNORMAL
MCHC RBC-ENTMCNC: 27.4 PG — SIGNIFICANT CHANGE UP (ref 27–34)
MCHC RBC-ENTMCNC: 31.5 GM/DL — LOW (ref 32–36)
MCV RBC AUTO: 86.9 FL — SIGNIFICANT CHANGE UP (ref 80–100)
NITRITE UR-MCNC: NEGATIVE — SIGNIFICANT CHANGE UP
NRBC # BLD: 0 /100 WBCS — SIGNIFICANT CHANGE UP (ref 0–0)
PH UR: 5.5 — SIGNIFICANT CHANGE UP (ref 5–8)
PLATELET # BLD AUTO: 260 K/UL — SIGNIFICANT CHANGE UP (ref 150–400)
POTASSIUM SERPL-MCNC: 3.9 MMOL/L — SIGNIFICANT CHANGE UP (ref 3.5–5.3)
POTASSIUM SERPL-SCNC: 3.9 MMOL/L — SIGNIFICANT CHANGE UP (ref 3.5–5.3)
PROT UR-MCNC: ABNORMAL MG/DL
RBC # BLD: 3.98 M/UL — SIGNIFICANT CHANGE UP (ref 3.8–5.2)
RBC # FLD: 14.1 % — SIGNIFICANT CHANGE UP (ref 10.3–14.5)
RBC CASTS # UR COMP ASSIST: ABNORMAL /HPF
SODIUM SERPL-SCNC: 138 MMOL/L — SIGNIFICANT CHANGE UP (ref 135–145)
SP GR SPEC: 1.02 — SIGNIFICANT CHANGE UP (ref 1–1.03)
UROBILINOGEN FLD QL: 0.2 E.U./DL — SIGNIFICANT CHANGE UP
WBC # BLD: 11.7 K/UL — HIGH (ref 3.8–10.5)
WBC # FLD AUTO: 11.7 K/UL — HIGH (ref 3.8–10.5)
WBC UR QL: < 5 /HPF — SIGNIFICANT CHANGE UP

## 2021-11-02 PROCEDURE — 99231 SBSQ HOSP IP/OBS SF/LOW 25: CPT

## 2021-11-02 PROCEDURE — 99222 1ST HOSP IP/OBS MODERATE 55: CPT

## 2021-11-02 RX ADMIN — ATORVASTATIN CALCIUM 10 MILLIGRAM(S): 80 TABLET, FILM COATED ORAL at 21:11

## 2021-11-02 RX ADMIN — Medication 325 MILLIGRAM(S): at 05:06

## 2021-11-02 RX ADMIN — SELEGILINE HYDROCHLORIDE 10 MILLIGRAM(S): 1.25 TABLET, ORALLY DISINTEGRATING ORAL at 11:25

## 2021-11-02 RX ADMIN — CEFTRIAXONE 100 MILLIGRAM(S): 500 INJECTION, POWDER, FOR SOLUTION INTRAMUSCULAR; INTRAVENOUS at 21:12

## 2021-11-02 RX ADMIN — Medication 325 MILLIGRAM(S): at 17:27

## 2021-11-02 NOTE — PROGRESS NOTE ADULT - PROBLEM SELECTOR PLAN 3
? due to Pneumonia/uti/diarrea  Pt had 3 episodes of diarrea. stool w/u and cl.diff  started on rx for Pneumonia but denies any symptoms of cough/fever/shortness of breath  send u/a and c/s  monitor for sepsis

## 2021-11-02 NOTE — PROGRESS NOTE ADULT - SUBJECTIVE AND OBJECTIVE BOX
Ortho Note    Subjective:  Pt comfortable without complaints, pain controlled with current pain medication regimen.   Discussed findings with patient on Xray- discussed plan to continue antibiotics, medicine to discuss findings with patient as well.   Patient reports diarrhea x1-2 day. Discussed plan to monitor with patient. Will consider sending- GI PCR if diarrhea continues   Denies CP, SOB, cough, productive sputum, N/V, numbness/tingling  Patient failed TOV overnight, silva in place, will discuss with Urology.        Vital Signs Last 24 Hrs  T(C): 36.8 (11-02-21 @ 08:29), Max: 36.8 (11-02-21 @ 08:29)  T(F): 98.3 (11-02-21 @ 08:29), Max: 98.3 (11-02-21 @ 08:29)  HR: 99 (11-02-21 @ 08:29) (99 - 99)  BP: 134/76 (11-02-21 @ 08:29) (134/76 - 134/76)  BP(mean): --  RR: 17 (11-02-21 @ 08:29) (17 - 17)  SpO2: 97% (11-02-21 @ 08:29) (97% - 97%)  AVSS    Objective:    Physical Exam:  General: Pt Alert and oriented, NAD  Left Hip aquacel DSG C/D/I  Pulses: +2 pedal pulses, wwp toes, cap refill less than 3 seconds  Sensation: silt intact, bilateral lower extremities  Motor: EHL/FHL/TA/GS- firing        Plan of Care:  A/P: 76yFemale POD#5 s/p Left Hip Johan  - afebrile, nontoxic appearance  - Pain Control- tylenol 975mg PO Q8h, Oxycodone 5-10mg PO Q4h prn moderate to severe pain   - DVT ppx: aspirin 325mg PO BID  - PT, WBS: WBAT  - Appreciate medicine recs  - bowel regimen, IS use  - continue antibiotics as per medicine  - u/a and urine culture  - continue to monitor frequency of bowel movements- will consider Gi pcr if diarrhea persists   - Appreciate Urology recs - silva in place,   - Dispo- [facility] pending auth and acceptance, medical clearance    Ortho Pager [phone]

## 2021-11-02 NOTE — PROGRESS NOTE ADULT - PROBLEM SELECTOR PLAN 6
Here for a suture removal left upper eyelid. No pain. Sutures intact. POD#5 s/p Left Hip Johan  Pain management  Bowel regimen  OOD  PT eval  dvt prophylaxis

## 2021-11-02 NOTE — CONSULT NOTE ADULT - SUBJECTIVE AND OBJECTIVE BOX
CONSULT NOTE:      HPI:  76F PMHx of Parkinson's, HLD, admitted to ortho s/p fall. Found to have femoral neck fracture and underwent left hip mireya. Called to see patient for post op retention. Per patient she has no difficulty urnating at home but states she only voids about 3 times per day, when she voids it is a large quantitiy. She further states that nick they scan her bladder and tell her the volume, she at that time does not feel full or have a urge to urinate before they straight cath her. She denies any dysuria or hematuria. Patient states she is not constipated at this time and states she ambulated for the first time today.    Vital Signs Last 24 Hrs  T(C): 36.8 (02 Nov 2021 08:29), Max: 37 (01 Nov 2021 16:45)  T(F): 98.3 (02 Nov 2021 08:29), Max: 98.6 (01 Nov 2021 16:45)  HR: 99 (02 Nov 2021 08:29) (86 - 105)  BP: 134/76 (02 Nov 2021 08:29) (116/78 - 137/84)  BP(mean): --  RR: 17 (02 Nov 2021 08:29) (16 - 18)  SpO2: 97% (02 Nov 2021 08:29) (96% - 98%)  I&O's Summary    01 Nov 2021 07:01  -  02 Nov 2021 07:00  --------------------------------------------------------  IN: 480 mL / OUT: 600 mL / NET: -120 mL        PE:  Gen: nad  Abd: soft, nd, nt  : silva in place draining clear urine      LABS:                        10.9   11.70 )-----------( 260      ( 02 Nov 2021 10:53 )             34.6     11-02    138  |  104  |  38<H>  ----------------------------<  130<H>  3.9   |  24  |  1.11    Ca    8.8      02 Nov 2021 10:53        Cultures      A/P 75 yo f with post op urinary retention  1)Per nurse at bedside volumes on bladder scanner prior to straight cath/ foleys are 200s, its possible patient needs a littel more time and volume to feel full. Would give patient to atleast 400 or if uncomfortable before catheterizing during next trial of void  2) can tov once patient ambulating more  3) discussed with gu team

## 2021-11-02 NOTE — PROGRESS NOTE ADULT - SUBJECTIVE AND OBJECTIVE BOX
CHUN RAINEY  76y  Female    Patient is a 76y old  Female who presents with a chief complaint of L Hip Pain (02 Nov 2021 13:33)      HPI:  76F PMHx of Parkinson's, HLD, presents after ground level fall onto L hip. Pt unable to bear weight or stand afterwards. Denies any head trauma/LOC. Pt notes she has been feeling "wobby" occasionally from her Parkinson's. Endorses that physical activity keeps her Parkinsonian symptoms at bay. Denies any numbness/tingling. (28 Oct 2021 02:03)      PAST MEDICAL & SURGICAL HISTORY:  Parkinson disease    Hyperlipidemia    No significant past surgical history        Home Medications:  Lipitor 10 mg oral tablet: 1 tab(s) orally once a day (01 Jul 2019 11:40)  selegiline 5 mg oral tablet: 1 tab(s) orally 2 times a day (01 Jul 2019 11:40)      76y    FAMILY HISTORY:  No pertinent family history in first degree relatives        Marital Status:  (   )    (   ) Single    (   )    (  )   Lives with: (  ) alone  (  ) children   (  ) spouse   (  ) parents  (  ) other  Recent Travel: No recent travel  Occupation:    Substance Use (street drugs): ( x ) never used  (  ) other:  Tobacco Usage:  ( x  ) never smoked   (   ) former smoker   (   ) current smoker  (     ) pack year  Alcohol Usage: None       MEDICATIONS  (STANDING):  aspirin enteric coated 325 milliGRAM(s) Oral two times a day  atorvastatin 10 milliGRAM(s) Oral at bedtime  cefTRIAXone   IVPB 1000 milliGRAM(s) IV Intermittent every 24 hours  chlorhexidine 2% Cloths 1 Application(s) Topical once  famotidine    Tablet 20 milliGRAM(s) Oral every 12 hours  influenza  Vaccine (HIGH DOSE) 0.7 milliLiter(s) IntraMuscular once  lactated ringers. 1000 milliLiter(s) (100 mL/Hr) IV Continuous <Continuous>  povidone iodine 5% Nasal Swab 1 Application(s) Both Nostrils once  selegiline Oral Tab/Cap 10 milliGRAM(s) Oral <User Schedule>    MEDICATIONS  (PRN):  acetaminophen     Tablet .. 975 milliGRAM(s) Oral every 8 hours PRN Mild Pain (1 - 3)  aluminum hydroxide/magnesium hydroxide/simethicone Suspension 30 milliLiter(s) Oral four times a day PRN Indigestion  calcium carbonate    500 mG (Tums) Chewable 1 Tablet(s) Chew every 4 hours PRN Indigestion  HYDROmorphone  Injectable 0.5 milliGRAM(s) IV Push every 4 hours PRN breakthrough pain  ondansetron Injectable 4 milliGRAM(s) IV Push every 6 hours PRN Nausea and/or Vomiting  oxyCODONE    IR 10 milliGRAM(s) Oral every 4 hours PRN Severe Pain (7 - 10)  oxyCODONE    IR 5 milliGRAM(s) Oral every 4 hours PRN Moderate Pain (4 - 6)    REVIEW OF SYSTEMS:  CONSTITUTIONAL: No fever, weight loss, or fatigue  EYES: No eye pain, visual disturbances, or discharge  ENMT:  No difficulty hearing, tinnitus, vertigo; No sinus or throat pain  NECK: No pain or stiffness  BREASTS: No pain, masses, or nipple discharge  RESPIRATORY: No cough, wheezing, chills or hemoptysis; No shortness of breath  CARDIOVASCULAR: No chest pain, palpitations, dizziness, or leg swelling  GASTROINTESTINAL: No abdominal or epigastric pain. No nausea, vomiting, or hematemesis; No diarrhea or constipation. No melena or hematochezia.  GENITOURINARY: No dysuria, frequency, hematuria, or incontinence  NEUROLOGICAL: No headaches, memory loss, loss of strength, numbness, or tremors  SKIN: No itching, burning, rashes, or lesions   LYMPH NODES: No enlarged glands  ENDOCRINE: No heat or cold intolerance; No hair loss  MUSCULOSKELETAL: Left hip pain  PSYCHIATRIC: No depression, anxiety, mood swings, or difficulty sleeping    Vital Signs Last 24 Hrs  T(C): 36.8 (02 Nov 2021 08:29), Max: 37 (01 Nov 2021 16:45)  T(F): 98.3 (02 Nov 2021 08:29), Max: 98.6 (01 Nov 2021 16:45)  HR: 99 (02 Nov 2021 08:29) (86 - 105)  BP: 134/76 (02 Nov 2021 08:29) (116/78 - 137/84)  BP(mean): --  RR: 17 (02 Nov 2021 08:29) (16 - 18)  SpO2: 97% (02 Nov 2021 08:29) (96% - 98%)    PHYSICAL EXAM:  GENERAL: NAD, well-groomed, well-developed  HEAD:  Atraumatic, Normocephalic  EYES: EOMI, PERRLA, conjunctiva and sclera clear  ENMT: No tonsillar erythema, exudates, or enlargement; Moist mucous membranes, Good dentition, No lesions  NECK: Supple, No JVD, Normal thyroid  NERVOUS SYSTEM:  Alert & Oriented X3, Good concentration; Motor Strength 5/5 B/L upper and lower extremities; DTRs 2+ intact and symmetric  CHEST/LUNG: Clear to percussion bilaterally; No rales, rhonchi, wheezing, or rubs  HEART: Regular rate and rhythm; No murmurs, rubs, or gallops  ABDOMEN: Soft, Nontender, Nondistended; Bowel sounds present  EXTREMITIES:  2+ Peripheral Pulses, No clubbing, cyanosis, or edema,left hip surgical site intact, ( refer to ortho note)  LYMPH: No lymphadenopathy noted  SKIN: No rashes or lesions    Consultant(s) Notes Reviewed:  [x ] YES  [ ] NO  Care Discussed with Consultants/Other Providers [ x] YES  [ ] NO    LABS:                        10.9   11.70 )-----------( 260      ( 02 Nov 2021 10:53 )             34.6     11-02    138  |  104  |  38<H>  ----------------------------<  130<H>  3.9   |  24  |  1.11    Ca    8.8      02 Nov 2021 10:53          CAPILLARY BLOOD GLUCOSE            RADIOLOGY & ADDITIONAL TESTS:  Echo:        LVSF:        EF:        RVSF:        LA:        RA:        Mitral Valve:        Aortic Valve:       Tricuspid Valve:        Pulmonic Valve:        Pericardium:   Imaging Personally Reviewed:  [ ] YES  [ ] NO

## 2021-11-03 ENCOUNTER — TRANSCRIPTION ENCOUNTER (OUTPATIENT)
Age: 76
End: 2021-11-03

## 2021-11-03 VITALS
HEART RATE: 106 BPM | OXYGEN SATURATION: 97 % | DIASTOLIC BLOOD PRESSURE: 85 MMHG | RESPIRATION RATE: 18 BRPM | SYSTOLIC BLOOD PRESSURE: 146 MMHG | TEMPERATURE: 97 F

## 2021-11-03 LAB
ANION GAP SERPL CALC-SCNC: 12 MMOL/L — SIGNIFICANT CHANGE UP (ref 5–17)
BUN SERPL-MCNC: 35 MG/DL — HIGH (ref 7–23)
CALCIUM SERPL-MCNC: 8.9 MG/DL — SIGNIFICANT CHANGE UP (ref 8.4–10.5)
CHLORIDE SERPL-SCNC: 105 MMOL/L — SIGNIFICANT CHANGE UP (ref 96–108)
CO2 SERPL-SCNC: 24 MMOL/L — SIGNIFICANT CHANGE UP (ref 22–31)
CREAT SERPL-MCNC: 1.03 MG/DL — SIGNIFICANT CHANGE UP (ref 0.5–1.3)
CULTURE RESULTS: NO GROWTH — SIGNIFICANT CHANGE UP
GLUCOSE SERPL-MCNC: 104 MG/DL — HIGH (ref 70–99)
HCT VFR BLD CALC: 35 % — SIGNIFICANT CHANGE UP (ref 34.5–45)
HGB BLD-MCNC: 11 G/DL — LOW (ref 11.5–15.5)
MCHC RBC-ENTMCNC: 27.8 PG — SIGNIFICANT CHANGE UP (ref 27–34)
MCHC RBC-ENTMCNC: 31.4 GM/DL — LOW (ref 32–36)
MCV RBC AUTO: 88.6 FL — SIGNIFICANT CHANGE UP (ref 80–100)
NRBC # BLD: 0 /100 WBCS — SIGNIFICANT CHANGE UP (ref 0–0)
PLATELET # BLD AUTO: 321 K/UL — SIGNIFICANT CHANGE UP (ref 150–400)
POTASSIUM SERPL-MCNC: 4.3 MMOL/L — SIGNIFICANT CHANGE UP (ref 3.5–5.3)
POTASSIUM SERPL-SCNC: 4.3 MMOL/L — SIGNIFICANT CHANGE UP (ref 3.5–5.3)
RBC # BLD: 3.95 M/UL — SIGNIFICANT CHANGE UP (ref 3.8–5.2)
RBC # FLD: 14.2 % — SIGNIFICANT CHANGE UP (ref 10.3–14.5)
SARS-COV-2 RNA SPEC QL NAA+PROBE: NEGATIVE — SIGNIFICANT CHANGE UP
SODIUM SERPL-SCNC: 141 MMOL/L — SIGNIFICANT CHANGE UP (ref 135–145)
SPECIMEN SOURCE: SIGNIFICANT CHANGE UP
WBC # BLD: 10.36 K/UL — SIGNIFICANT CHANGE UP (ref 3.8–10.5)
WBC # FLD AUTO: 10.36 K/UL — SIGNIFICANT CHANGE UP (ref 3.8–10.5)

## 2021-11-03 PROCEDURE — 99233 SBSQ HOSP IP/OBS HIGH 50: CPT

## 2021-11-03 RX ORDER — ACETAMINOPHEN 500 MG
2 TABLET ORAL
Qty: 0 | Refills: 0 | DISCHARGE
Start: 2021-11-03

## 2021-11-03 RX ORDER — CEFPODOXIME PROXETIL 100 MG
1 TABLET ORAL
Qty: 0 | Refills: 0 | DISCHARGE

## 2021-11-03 RX ORDER — ASPIRIN/CALCIUM CARB/MAGNESIUM 324 MG
1 TABLET ORAL
Qty: 0 | Refills: 0 | DISCHARGE
Start: 2021-11-03

## 2021-11-03 RX ORDER — OXYCODONE HYDROCHLORIDE 5 MG/1
1 TABLET ORAL
Qty: 0 | Refills: 0 | DISCHARGE
Start: 2021-11-03

## 2021-11-03 RX ORDER — FAMOTIDINE 10 MG/ML
1 INJECTION INTRAVENOUS
Qty: 0 | Refills: 0 | DISCHARGE
Start: 2021-11-03

## 2021-11-03 RX ADMIN — SELEGILINE HYDROCHLORIDE 10 MILLIGRAM(S): 1.25 TABLET, ORALLY DISINTEGRATING ORAL at 11:45

## 2021-11-03 RX ADMIN — Medication 325 MILLIGRAM(S): at 05:24

## 2021-11-03 NOTE — DISCHARGE NOTE NURSING/CASE MANAGEMENT/SOCIAL WORK - PATIENT PORTAL LINK FT
You can access the FollowMyHealth Patient Portal offered by [facility] by registering at the following website: [email or web address]. By joining [facility]’s FollowMyHealth portal, you will also be able to view your health information using other applications (apps) compatible with our system.

## 2021-11-03 NOTE — PROGRESS NOTE ADULT - REASON FOR ADMISSION
L Hip Pain

## 2021-11-03 NOTE — PROGRESS NOTE ADULT - ASSESSMENT
76F w HLD, Parkinson's disease, p/w ground level fall onto L hip found to have Displaced L FNF s/p L hemiarthroplasty 10/28 w Dr. Loja - complicated by continued urinary retention - silva replaced 10/30    #Post-op state - pain controlled. On ASA BID for PPx. On bowel regimen and incentive spirometer.   #TAMEKA - Continues to improve. Stable at 1.05. peaked at 1.6. Suspect hypovolemia/pre-renal etiology vs obstruction (pt was retaining 700cc prior to surgery)  #Acute urinary retention - likely 2/2 post-op state. Continue to mobilize;   #HLD - c/w home statin  #Parkinson's disease - c/w seleginline 10 daily   #L Hip fx - mgmt per orthopedics. pt has h/o osteopenia. see osteoporosis labs below   - 25,OH-D at target at 35.   #CAP - satting on RA. on 5d CTX and 3d Azithromycin. R mid lung opacity.   #Constipation - Resolved. BM 11/1.  Takes laxatives 3-4/week. Was admitted in the past for impaction. Pt has BM every 1-2 days at baseline  #Borderline tachycardia - 90-100s. Pt asymptomatic. Satting well on room air - will continue to encourage incentive spirometer use.     Recommendations  Renal function stable. Pt to have TOV as outpatient. Decreased mobility likely contributing to urinary retention  From medical standpoint, pt optimized for disposition to acute rehab  Can hold standing bowel regimen  Can complete 5d total course of CTX (transition to cefpodoxime BID thru 11/5) for CAP    -in setting of Parkinson's disease, pt is at increased risk of delirium. Avoid benzodiazepines. AVOID haldol. Optimize pain, start bowel regimen. AVOID physicial restraints. Mobilize as tolerated post-operatively.     PMD: Dr. Clayton Flannery  DISPO: Acute rehab. J[place]

## 2021-11-03 NOTE — PROGRESS NOTE ADULT - SUBJECTIVE AND OBJECTIVE BOX
INTERVAL HPI/OVERNIGHT EVENTS: PRAVEEN O/N    SUBJECTIVE: Patient seen and examined at bedside.   Patient needed silva replaced yesterday. seen by urology. Passing BMs  Able to walk a bit further w PT today. No LH/DIzziness. No pain in hip. Denies any fever, cough, chest pain, dyspnea. Eating wo nausea, abd pain.        OBJECTIVE:    VITAL SIGNS:  ICU Vital Signs Last 24 Hrs  T(C): 36.4 (2021 08:25), Max: 36.6 (2021 16:10)  T(F): 97.5 (2021 08:25), Max: 97.9 (2021 20:35)  HR: 85 (2021 08:25) (85 - 108)  BP: 128/80 (2021 08:25) (118/81 - 136/84)  BP(mean): 102 (2021 05:20) (93 - 102)  ABP: --  ABP(mean): --  RR: 18 (2021 08:25) (18 - 19)  SpO2: 97% (2021 08:25) (95% - 98%)         @ 07:01  -   @ 07:00  --------------------------------------------------------  IN: 480 mL / OUT: 650 mL / NET: -170 mL      CAPILLARY BLOOD GLUCOSE          PHYSICAL EXAM:  GEN: female in NAD on RA  HEENT: NC/AT, MMM  NECK: Supple  CV: RRR, nml S1S2, no murmurs  PULM: nml effort, CTAB wo rales, rhonchi, or weakness  ABD: Soft, non-distended, NABS, non-tender  NEURO  A/O x3, moving all extremities.   5/5 in BUE.  L lateral thigh dressing c/d/i. Decreased strength in L hip and knee 2/2 pain. L plantarflex/ext 5/5.   PSYCH: Appropriate    MEDICATIONS:  MEDICATIONS  (STANDING):  aspirin enteric coated 325 milliGRAM(s) Oral two times a day  atorvastatin 10 milliGRAM(s) Oral at bedtime  cefTRIAXone   IVPB 1000 milliGRAM(s) IV Intermittent every 24 hours  chlorhexidine 2% Cloths 1 Application(s) Topical once  famotidine    Tablet 20 milliGRAM(s) Oral every 12 hours  influenza  Vaccine (HIGH DOSE) 0.7 milliLiter(s) IntraMuscular once  lactated ringers. 1000 milliLiter(s) (100 mL/Hr) IV Continuous <Continuous>  povidone iodine 5% Nasal Swab 1 Application(s) Both Nostrils once  selegiline Oral Tab/Cap 10 milliGRAM(s) Oral <User Schedule>    MEDICATIONS  (PRN):  acetaminophen     Tablet .. 975 milliGRAM(s) Oral every 8 hours PRN Mild Pain (1 - 3)  aluminum hydroxide/magnesium hydroxide/simethicone Suspension 30 milliLiter(s) Oral four times a day PRN Indigestion  calcium carbonate    500 mG (Tums) Chewable 1 Tablet(s) Chew every 4 hours PRN Indigestion  HYDROmorphone  Injectable 0.5 milliGRAM(s) IV Push every 4 hours PRN breakthrough pain  ondansetron Injectable 4 milliGRAM(s) IV Push every 6 hours PRN Nausea and/or Vomiting  oxyCODONE    IR 10 milliGRAM(s) Oral every 4 hours PRN Severe Pain (7 - 10)  oxyCODONE    IR 5 milliGRAM(s) Oral every 4 hours PRN Moderate Pain (4 - 6)      ALLERGIES:  Allergies    penicillin (Swelling)  sulfa drugs (Nausea)    Intolerances        LABS:                        11.0   10.36 )-----------( 321      ( 2021 11:31 )             35.0     11-03    141  |  105  |  35<H>  ----------------------------<  104<H>  4.3   |  24  |  1.03    Ca    8.9      2021 11:31        Urinalysis Basic - ( 2021 17:45 )    Color: Yellow / Appearance: Clear / S.020 / pH: x  Gluc: x / Ketone: NEGATIVE  / Bili: Negative / Urobili: 0.2 E.U./dL   Blood: x / Protein: Trace mg/dL / Nitrite: NEGATIVE   Leuk Esterase: Trace / RBC: Many /HPF / WBC < 5 /HPF   Sq Epi: x / Non Sq Epi: 0-5 /HPF / Bacteria: Present /HPF        RADIOLOGY & ADDITIONAL TESTS: Reviewed.

## 2021-11-03 NOTE — CHART NOTE - NSCHARTNOTEFT_GEN_A_CORE
Admitting Diagnosis:   Patient is a 76y old  Female who presents with a chief complaint of L Hip Pain (02 Nov 2021 15:47)    PAST MEDICAL & SURGICAL HISTORY:  Parkinson disease    Hyperlipidemia    No significant past surgical history    Current Nutrition Order: Regular     PO Intake: Good (%) [   ]  Fair (50-75%) [ x ]  Poor (<25%) [   ]    GI Issues:   No N/V/C/D, last BM 11/02 - experienced diarrhea. Diarrhea and feeling full up to chest have improved this morning. No abdominal distention noted.    Pain:  Absence of nonverbal indicators     Skin Integrity:  Surgical incision on left hip. Edema 2+ on left hip. Max Score 20. No pressure injury noted.     Labs:   11-03    141  |  105  |  35<H>  ----------------------------<  104<H>  4.3   |  24  |  1.03    Ca    8.9      03 Nov 2021 11:31    CAPILLARY BLOOD GLUCOSE  Medications:  MEDICATIONS  (STANDING):  aspirin enteric coated 325 milliGRAM(s) Oral two times a day  atorvastatin 10 milliGRAM(s) Oral at bedtime  cefTRIAXone   IVPB 1000 milliGRAM(s) IV Intermittent every 24 hours  chlorhexidine 2% Cloths 1 Application(s) Topical once  famotidine    Tablet 20 milliGRAM(s) Oral every 12 hours  influenza  Vaccine (HIGH DOSE) 0.7 milliLiter(s) IntraMuscular once  lactated ringers. 1000 milliLiter(s) (100 mL/Hr) IV Continuous <Continuous>  povidone iodine 5% Nasal Swab 1 Application(s) Both Nostrils once  selegiline Oral Tab/Cap 10 milliGRAM(s) Oral <User Schedule>    MEDICATIONS  (PRN):  acetaminophen     Tablet .. 975 milliGRAM(s) Oral every 8 hours PRN Mild Pain (1 - 3)  aluminum hydroxide/magnesium hydroxide/simethicone Suspension 30 milliLiter(s) Oral four times a day PRN Indigestion  calcium carbonate    500 mG (Tums) Chewable 1 Tablet(s) Chew every 4 hours PRN Indigestion  HYDROmorphone  Injectable 0.5 milliGRAM(s) IV Push every 4 hours PRN breakthrough pain  ondansetron Injectable 4 milliGRAM(s) IV Push every 6 hours PRN Nausea and/or Vomiting  oxyCODONE    IR 10 milliGRAM(s) Oral every 4 hours PRN Severe Pain (7 - 10)  oxyCODONE    IR 5 milliGRAM(s) Oral every 4 hours PRN Moderate Pain (4 - 6)    Anthropometric Measurements:   · Height for BMI (FEET)	5 Feet  · Height for BMI (INCHES)	6 Inch(s)  · Height for BMI (CENTIMETERS)	167.64 Centimeter(s)  · Weight for BMI (lbs)	136.8 lb  · Weight for BMI (kg)	62.1 kg  · Body Mass Index	22  · Ideal Body Weight (lbs)	130  · Ideal Body Weight (kg)	58.9    Weight Change: No new weight obtained    Nutrition Focused Physical Exam: Completed [  ] Not Pertinent [ x ]     Estimated energy needs:   ABW used for calculations as %IBW is between % (105%), adjusted for age and wound healing  Kcal (20-25 kcal/kg): 6966-2229 kcal   Protein (1.2-1.4 g/kg pro): 74.4-86.8g pro   Fluids (30-35 ml/kg): 4107-1579 ml    Subjective:   Pt is a 77 y/o female. PMH of Parkinson's and HLD. Admitted after ground level fall onto L hip, unable to bear weight or stand afterwards. s/p L hemiarthroplasty 10/28.  Per progress note - care coordination, JOVI continues to follow. JOVI met with patient at bedside to [facility]  [facility] & patient bringing her own Selegiline medication and acceptance to [facility] rehab. [facility] but stated she would like to speak with her friends and family regarding the choices. JOVI informed patient [facility] is a 2 person max assist and non-ambulatory. JOVI to continue to follow.    Pt was seen resting in bed. Currently on regular diet. Pt reported ~50% PO intake, improvement than intake during initial assessment. No N/V/C/D with last BM 11/02 - experienced diarrhea. Diarrhea and feeling full up to chest have improved this morning. Per nursing documentation: surgical incision on left hip. Edema 2+ on left hip. Max Score 20. No pressure injury noted. PTA pt reported eating 3 meals/day consisting of vegetables and protein (i.e pork and beef), as well as snacks consisting of banana and chocolate. Pt reported no weight change with UBW of 136 lb, consistent with admitted weight. NKFA. RD continued to encourage pt to increase PO as tolerated and hydrate adequately. Please see full recommendations below.    Previous Nutrition Diagnosis: Inadequate oral intake RT decreased appetite 2/2 surgery and diarrhea AEB PO intake ~50% of estimate needs.    Active [ x ]  Resolved [   ]    If resolved, new PES:     Goal: Pt to meet >75% of estimate needs    Recommendations:  1. Continue with regular diet   2. Continue monitor intake and bowel movement   3. Provide additional education as needed    Education: deferred     Risk Level: High [ x ] Moderate [   ] Low [   ] Admitting Diagnosis:   Patient is a 76y old  Female who presents with a chief complaint of L Hip Pain (02 Nov 2021 15:47)    PAST MEDICAL & SURGICAL HISTORY:  Parkinson disease    Hyperlipidemia    No significant past surgical history    Current Nutrition Order: Regular     PO Intake: Good (%) [   ]  Fair (50-75%) [ x ]  Poor (<25%) [ x ] - pt consuming 25-50% of meals     GI Issues:   No N/V/C/D, last BM 11/02 - experienced diarrhea likely 2/2 antibiotic. Diarrhea and feeling full up to chest have improved this morning. No abdominal distention noted.    Pain:  1/10 pain to left hip - well-controlled with current pain regimen     Skin Integrity:  Surgical incision on left hip. Edema 2+ on left hip. Max Score 20. No pressure injury noted.     Labs:   11-03    141  |  105  |  35<H>  ----------------------------<  104<H>  4.3   |  24  |  1.03    Ca    8.9      03 Nov 2021 11:31    CAPILLARY BLOOD GLUCOSE  Medications:  MEDICATIONS  (STANDING):  aspirin enteric coated 325 milliGRAM(s) Oral two times a day  atorvastatin 10 milliGRAM(s) Oral at bedtime  cefTRIAXone   IVPB 1000 milliGRAM(s) IV Intermittent every 24 hours  chlorhexidine 2% Cloths 1 Application(s) Topical once  famotidine    Tablet 20 milliGRAM(s) Oral every 12 hours  influenza  Vaccine (HIGH DOSE) 0.7 milliLiter(s) IntraMuscular once  lactated ringers. 1000 milliLiter(s) (100 mL/Hr) IV Continuous <Continuous>  povidone iodine 5% Nasal Swab 1 Application(s) Both Nostrils once  selegiline Oral Tab/Cap 10 milliGRAM(s) Oral <User Schedule>    MEDICATIONS  (PRN):  acetaminophen     Tablet .. 975 milliGRAM(s) Oral every 8 hours PRN Mild Pain (1 - 3)  aluminum hydroxide/magnesium hydroxide/simethicone Suspension 30 milliLiter(s) Oral four times a day PRN Indigestion  calcium carbonate    500 mG (Tums) Chewable 1 Tablet(s) Chew every 4 hours PRN Indigestion  HYDROmorphone  Injectable 0.5 milliGRAM(s) IV Push every 4 hours PRN breakthrough pain  ondansetron Injectable 4 milliGRAM(s) IV Push every 6 hours PRN Nausea and/or Vomiting  oxyCODONE    IR 10 milliGRAM(s) Oral every 4 hours PRN Severe Pain (7 - 10)  oxyCODONE    IR 5 milliGRAM(s) Oral every 4 hours PRN Moderate Pain (4 - 6)    Anthropometric Measurements:   · Height for BMI (FEET)	5 Feet  · Height for BMI (INCHES)	6 Inch(s)  · Height for BMI (CENTIMETERS)	167.64 Centimeter(s)  · Weight for BMI (lbs)	136.8 lb  · Weight for BMI (kg)	62.1 kg  · Body Mass Index	22  · Ideal Body Weight (lbs)	130  · Ideal Body Weight (kg)	58.9    Weight Change: No new weight obtained    Nutrition Focused Physical Exam: Completed [  ] Not Pertinent [ x ]     Estimated energy needs:   ABW used for calculations as %IBW is between % (105%), adjusted for age and wound healing  Kcal (20-25 kcal/kg): 3814-3810 kcal   Protein (1.2-1.4 g/kg pro): 74.4-86.8g pro   Fluids (30-35 ml/kg): 7165-9930 ml    Subjective:   Pt is a 77 y/o female. PMH of Parkinson's and HLD. Admitted after ground level fall onto L hip, unable to bear weight or stand afterwards. s/p L hemiarthroplasty 10/28.  Per CM note, plan for d/c to [facility] once medically cleared.     Pt was seen resting in bed. Currently on regular diet. Pt reported 25-50% PO intake, improvement than intake during initial assessment. No N/V/C/D with last BM 11/02 - experienced diarrhea. Diarrhea and feeling full up to chest have improved this morning. RD continued to encourage pt to increase PO as tolerated and hydrate adequately. Please see full recommendations below.    Previous Nutrition Diagnosis: Inadequate oral intake RT decreased appetite 2/2 surgery and diarrhea AEB PO intake <50% of estimate needs.    Active [ x ]  Resolved [   ]    If resolved, new PES:     Goal: Pt to meet >75% of estimate needs    Recommendations:  1. Continue with regular diet   >> Recommend addition Ensure Enlive BID (350 nely/20 g pro each)   2. Continue monitor intake and bowel movement   3. Monitor lytes and replete prn   4. Provide additional education as needed    Education: RD continued to encourage pt to increase PO as tolerated and hydrate adequately.    Risk Level: High [ x ] Moderate [   ] Low [   ]

## 2021-11-03 NOTE — DISCHARGE NOTE NURSING/CASE MANAGEMENT/SOCIAL WORK - BRAND OF COVID-19 VACCINATION
pt states Pfizer booster was received in September, cannot remember exact date/Pfizer dose 1, 2, and 3

## 2021-11-03 NOTE — DISCHARGE NOTE NURSING/CASE MANAGEMENT/SOCIAL WORK - NSDCFUADDAPPT_GEN_ALL_CORE_FT
You should follow up with the outpatient genitourinary clinic within 1-2 weeks for your silva catheter. You can call [phone] to schedule an appointment. The office is located at [address] [place] [address].

## 2021-11-03 NOTE — PROGRESS NOTE ADULT - PROVIDER SPECIALTY LIST ADULT
Orthopedics
Orthopedics
Hospitalist
Orthopedics
Orthopedics
Hospitalist
Hospitalist
Orthopedics
Orthopedics
Hospitalist

## 2021-11-11 LAB — SURGICAL PATHOLOGY STUDY: SIGNIFICANT CHANGE UP
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
## 2021-11-15 DIAGNOSIS — Z88.2 ALLERGY STATUS TO SULFONAMIDES: ICD-10-CM

## 2021-11-15 DIAGNOSIS — J18.9 PNEUMONIA, UNSPECIFIED ORGANISM: ICD-10-CM

## 2021-11-15 DIAGNOSIS — R19.7 DIARRHEA, UNSPECIFIED: ICD-10-CM

## 2021-11-15 DIAGNOSIS — G20 PARKINSON'S DISEASE: ICD-10-CM

## 2021-11-15 DIAGNOSIS — S72.002A FRACTURE OF UNSPECIFIED PART OF NECK OF LEFT FEMUR, INITIAL ENCOUNTER FOR CLOSED FRACTURE: ICD-10-CM

## 2021-11-15 DIAGNOSIS — E78.5 HYPERLIPIDEMIA, UNSPECIFIED: ICD-10-CM

## 2021-11-15 DIAGNOSIS — R33.8 OTHER RETENTION OF URINE: ICD-10-CM

## 2021-11-15 DIAGNOSIS — N17.9 ACUTE KIDNEY FAILURE, UNSPECIFIED: ICD-10-CM
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
## 2021-11-15 DIAGNOSIS — M81.0 AGE-RELATED OSTEOPOROSIS WITHOUT CURRENT PATHOLOGICAL FRACTURE: ICD-10-CM

## 2021-11-15 DIAGNOSIS — R00.0 TACHYCARDIA, UNSPECIFIED: ICD-10-CM

## 2021-11-15 DIAGNOSIS — K59.00 CONSTIPATION, UNSPECIFIED: ICD-10-CM

## 2021-11-15 DIAGNOSIS — Z88.0 ALLERGY STATUS TO PENICILLIN: ICD-10-CM

## 2021-12-22 PROCEDURE — 36415 COLL VENOUS BLD VENIPUNCTURE: CPT

## 2021-12-22 PROCEDURE — C1776: CPT

## 2021-12-22 PROCEDURE — 86900 BLOOD TYPING SEROLOGIC ABO: CPT

## 2021-12-22 PROCEDURE — 86850 RBC ANTIBODY SCREEN: CPT

## 2021-12-22 PROCEDURE — 81001 URINALYSIS AUTO W/SCOPE: CPT

## 2021-12-22 PROCEDURE — 85730 THROMBOPLASTIN TIME PARTIAL: CPT

## 2021-12-22 PROCEDURE — 97535 SELF CARE MNGMENT TRAINING: CPT

## 2021-12-22 PROCEDURE — 97112 NEUROMUSCULAR REEDUCATION: CPT

## 2021-12-22 PROCEDURE — 73502 X-RAY EXAM HIP UNI 2-3 VIEWS: CPT

## 2021-12-22 PROCEDURE — 86901 BLOOD TYPING SEROLOGIC RH(D): CPT

## 2021-12-22 PROCEDURE — 85610 PROTHROMBIN TIME: CPT

## 2021-12-22 PROCEDURE — 83615 LACTATE (LD) (LDH) ENZYME: CPT

## 2021-12-22 PROCEDURE — 84439 ASSAY OF FREE THYROXINE: CPT

## 2021-12-22 PROCEDURE — 84443 ASSAY THYROID STIM HORMONE: CPT

## 2021-12-22 PROCEDURE — 85027 COMPLETE CBC AUTOMATED: CPT

## 2021-12-22 PROCEDURE — 82310 ASSAY OF CALCIUM: CPT

## 2021-12-22 PROCEDURE — 86769 SARS-COV-2 COVID-19 ANTIBODY: CPT

## 2021-12-22 PROCEDURE — 73552 X-RAY EXAM OF FEMUR 2/>: CPT

## 2021-12-22 PROCEDURE — 97116 GAIT TRAINING THERAPY: CPT

## 2021-12-22 PROCEDURE — 87086 URINE CULTURE/COLONY COUNT: CPT

## 2021-12-22 PROCEDURE — 83970 ASSAY OF PARATHORMONE: CPT

## 2021-12-22 PROCEDURE — 99285 EMERGENCY DEPT VISIT HI MDM: CPT | Mod: 25

## 2021-12-22 PROCEDURE — 71045 X-RAY EXAM CHEST 1 VIEW: CPT

## 2021-12-22 PROCEDURE — 97161 PT EVAL LOW COMPLEX 20 MIN: CPT

## 2021-12-22 PROCEDURE — 96374 THER/PROPH/DIAG INJ IV PUSH: CPT

## 2021-12-22 PROCEDURE — C1713: CPT

## 2021-12-22 PROCEDURE — 97110 THERAPEUTIC EXERCISES: CPT

## 2021-12-22 PROCEDURE — 87635 SARS-COV-2 COVID-19 AMP PRB: CPT

## 2021-12-22 PROCEDURE — 85025 COMPLETE CBC W/AUTO DIFF WBC: CPT

## 2021-12-22 PROCEDURE — 80048 BASIC METABOLIC PNL TOTAL CA: CPT

## 2021-12-22 PROCEDURE — 97530 THERAPEUTIC ACTIVITIES: CPT

## 2021-12-22 PROCEDURE — 88300 SURGICAL PATH GROSS: CPT

## 2021-12-22 PROCEDURE — 93005 ELECTROCARDIOGRAM TRACING: CPT

## 2021-12-22 PROCEDURE — 82306 VITAMIN D 25 HYDROXY: CPT

## 2022-01-24 NOTE — ED ADULT NURSE NOTE - SUICIDE SCREENING QUESTION 1
Patient in for a nurse visit to receive influenza vaccine  Flublok 0 5 mL administered on left arm  Patient tolerated well 
No

## 2022-03-28 NOTE — PROGRESS NOTE ADULT - SUBJECTIVE AND OBJECTIVE BOX
Ortho Note    Pt seen and examined at bedside this AM, comfortable without complaints, pain controlled. Denies CP, SOB, N/V, numbness/tingling     Vital Signs Last 24 Hrs  T(C): 36.8 (10-30-21 @ 08:43), Max: 36.8 (10-30-21 @ 08:43)  T(F): 98.3 (10-30-21 @ 08:43), Max: 98.3 (10-30-21 @ 08:43)  HR: 101 (10-30-21 @ 08:43) (101 - 101)  BP: 137/84 (10-30-21 @ 08:43) (137/84 - 137/84)  BP(mean): --  RR: 17 (10-30-21 @ 08:43) (17 - 17)  SpO2: 94% (10-30-21 @ 08:43) (94% - 94%)  AVSS    General: Pt Alert and oriented, NAD  L hip DSG C/D/I  Sensation intact s/s/sp/dp/t and symmetric   Motor: EHL/FHL/TA/GS 5/5 and symmetric   2+ DP pulse  Toes WWP      A/P: 76yFemale s/p L hip mireya    - Stable  - Pain Control  - DVT ppx: ASA  - PT, WBS: WBAT  - DC shira, f/u TOV  - Disp acute rehab     Ortho Pager [phone] Implemented All Universal Safety Interventions:  [place] to call system. Call bell, personal items and telephone within reach. Instruct patient to call for assistance. Room bathroom lighting operational. Non-slip footwear when patient is off stretcher. Physically safe environment: no spills, clutter or unnecessary equipment. Stretcher in lowest position, wheels locked, appropriate side rails in place.

## 2022-07-11 NOTE — H&P ADULT - HISTORY OF PRESENT ILLNESS
Appointment made patient verbalize understanding.   Pt is a 73F with a history of Parkinson's disease, constipation presenting for abdominal and rectal pain.      In the ED, mildly hypertensive with otherwise stable VS. Labs notable for normal WBC count, LFTs, lytes and creatinine. She underwent attempted manual disimpaction. She had a CT A/P which showed significant stool burden, stercoral colitis, and distended urinary bladder. She received 1L NS and zofran. Pt is a 73F with a history of Parkinson's disease, constipation, presenting for worsening abdominal bloating and rectal pain. Patient has reported approximately 1.5 weeks of abdominal bloating and constipation. She went into her PCP (Dr Bowers) a week ago and underwent disimpaction. Has also had 2 ED visits in the last week, most recently 3 days ago with KUB showing nonspecific gas pattern. For past two days she has had persistent generalized abdominal distension, bloating, and rectal pain (which she attributes to manipulation from disimpaction). Patient has tried miralax, colace, and prunes with no relief of symptoms. Also complains of urinary retention for the past two days but states that in the ED she was able to produce some urine. Endorses mild nausea but no vomiting, fevers, chills, chest pain, SOB, dysuria.       In the ED, mildly hypertensive with otherwise stable VS. Labs notable for normal WBC count, LFTs, lytes and creatinine. She underwent attempted manual disimpaction. She had a CT A/P which showed significant stool burden, stercoral colitis, and distended urinary bladder. She received 1L NS and zofran.

## 2022-08-24 NOTE — ED ADULT TRIAGE NOTE - NS ED NURSE BANDS TYPE
Quality 111:Pneumonia Vaccination Status For Older Adults: Pneumococcal Vaccination not Administered or Previously Received, Reason not Otherwise Specified Quality 110: Preventive Care And Screening: Influenza Immunization: Influenza Immunization not Administered because Patient Refused. Additional Notes: Has had Covid shot Quality 431: Preventive Care And Screening: Unhealthy Alcohol Use - Screening: Patient not identified as an unhealthy alcohol user when screened for unhealthy alcohol use using a systematic screening method Detail Level: Detailed Name band;

## 2023-06-12 NOTE — ED ADULT NURSE NOTE - NURSING NEURO ORIENTATION
Hydroxyzine Pregnancy And Lactation Text: This medication is not safe during pregnancy and should not be taken. It is also excreted in breast milk and breast feeding isn't recommended. oriented to person, place and time

## 2024-05-30 ENCOUNTER — APPOINTMENT (OUTPATIENT)
Dept: ORTHOPEDIC SURGERY | Facility: CLINIC | Age: 79
End: 2024-05-30

## 2024-07-31 NOTE — ED ADULT NURSE NOTE - NSFALLRSKASSESSTYPE_ED_ALL_ED
Detail Level: Simple
Render In Strict Bullet Format?: No
Continue Regimen: Ketoconazole cream BID + Ketoconazole shampoo once daily to active flare ups as needed
Initial (On Arrival)
Initiate Treatment: Triamcinalone acetonide 0.1% topical cream as prescribed to AAs as needed\\nRecommend OTC Allegra QAM  + Benadryl QHS prn itching
Detail Level: Zone

## 2024-10-28 NOTE — DIETITIAN INITIAL EVALUATION ADULT. - PERTINENT LABORATORY DATA
Cardiac Testing: Patient given instructions regarding right heart catheterization in the next 1-2 weeks. Discussed purpose, preparation, procedure and when to expect results reported back to the patient. Patient demonstrated understanding of this information and agreed to call with further questions or concerns.    Labs: Patient was given results of the laboratory testing obtained today. Patient demonstrated understanding of this information and agreed to call with further questions or concerns.     Med Reconcile: Reviewed and verified all current medications with the patient. The updated medication list was printed and given to the patient.    Return Appointment: Patient given instructions regarding scheduling next clinic visit. Patient demonstrated understanding of this information and agreed to call with further questions or concerns.     Referral to neuromuscular neurology, neuromuscular PFTs.  Referral to mental health provider.  [facility] in January as scheduled.  EP with Dr. Levi as scheduled.  Follow up with Dr. Fuentes in 3-4 months with labs and device check prior.    Patient stated she understood all health information given and agreed to call with further questions or concerns.    Martha Lawton RN   Serum glucose 144 <H> Serum glucose 144 <H>  BUN 29 <H>  eGFR 52 <L>

## 2024-11-24 VITALS
HEIGHT: 66 IN | RESPIRATION RATE: 18 BRPM | HEART RATE: 92 BPM | OXYGEN SATURATION: 96 % | SYSTOLIC BLOOD PRESSURE: 162 MMHG | WEIGHT: 132.06 LBS | DIASTOLIC BLOOD PRESSURE: 85 MMHG | TEMPERATURE: 98 F

## 2024-11-24 LAB
ANION GAP SERPL CALC-SCNC: 9 MMOL/L — SIGNIFICANT CHANGE UP (ref 5–17)
APTT BLD: 23 SEC — LOW (ref 24.5–35.6)
BLD GP AB SCN SERPL QL: NEGATIVE — SIGNIFICANT CHANGE UP
BUN SERPL-MCNC: 34 MG/DL — HIGH (ref 7–23)
CALCIUM SERPL-MCNC: 9.1 MG/DL — SIGNIFICANT CHANGE UP (ref 8.4–10.5)
CHLORIDE SERPL-SCNC: 101 MMOL/L — SIGNIFICANT CHANGE UP (ref 96–108)
CO2 SERPL-SCNC: 24 MMOL/L — SIGNIFICANT CHANGE UP (ref 22–31)
CREAT SERPL-MCNC: 1.09 MG/DL — SIGNIFICANT CHANGE UP (ref 0.5–1.3)
EGFR: 52 ML/MIN/1.73M2 — LOW
GLUCOSE SERPL-MCNC: 113 MG/DL — HIGH (ref 70–99)
HCT VFR BLD CALC: 36.6 % — SIGNIFICANT CHANGE UP (ref 34.5–45)
HGB BLD-MCNC: 11.9 G/DL — SIGNIFICANT CHANGE UP (ref 11.5–15.5)
INR BLD: 0.96 — SIGNIFICANT CHANGE UP (ref 0.85–1.16)
MCHC RBC-ENTMCNC: 28.7 PG — SIGNIFICANT CHANGE UP (ref 27–34)
MCHC RBC-ENTMCNC: 32.5 G/DL — SIGNIFICANT CHANGE UP (ref 32–36)
MCV RBC AUTO: 88.4 FL — SIGNIFICANT CHANGE UP (ref 80–100)
NRBC # BLD: 0 /100 WBCS — SIGNIFICANT CHANGE UP (ref 0–0)
PLATELET # BLD AUTO: 272 K/UL — SIGNIFICANT CHANGE UP (ref 150–400)
POTASSIUM SERPL-MCNC: 5.2 MMOL/L — SIGNIFICANT CHANGE UP (ref 3.5–5.3)
POTASSIUM SERPL-SCNC: 5.2 MMOL/L — SIGNIFICANT CHANGE UP (ref 3.5–5.3)
PROTHROM AB SERPL-ACNC: 11.2 SEC — SIGNIFICANT CHANGE UP (ref 9.9–13.4)
RBC # BLD: 4.14 M/UL — SIGNIFICANT CHANGE UP (ref 3.8–5.2)
RBC # FLD: 13.2 % — SIGNIFICANT CHANGE UP (ref 10.3–14.5)
RH IG SCN BLD-IMP: POSITIVE — SIGNIFICANT CHANGE UP
SODIUM SERPL-SCNC: 134 MMOL/L — LOW (ref 135–145)
WBC # BLD: 11.87 K/UL — HIGH (ref 3.8–10.5)
WBC # FLD AUTO: 11.87 K/UL — HIGH (ref 3.8–10.5)

## 2024-11-24 PROCEDURE — 73552 X-RAY EXAM OF FEMUR 2/>: CPT | Mod: 26,LT

## 2024-11-24 PROCEDURE — 73120 X-RAY EXAM OF HAND: CPT | Mod: 26,LT

## 2024-11-24 PROCEDURE — 73110 X-RAY EXAM OF WRIST: CPT | Mod: 26,LT

## 2024-11-24 PROCEDURE — 99285 EMERGENCY DEPT VISIT HI MDM: CPT | Mod: FS

## 2024-11-24 PROCEDURE — 73502 X-RAY EXAM HIP UNI 2-3 VIEWS: CPT | Mod: 26,LT

## 2024-11-24 RX ORDER — ACETAMINOPHEN 500MG 500 MG/1
650 TABLET, COATED ORAL ONCE
Refills: 0 | Status: COMPLETED | OUTPATIENT
Start: 2024-11-24 | End: 2024-11-24

## 2024-11-24 RX ADMIN — ACETAMINOPHEN 500MG 650 MILLIGRAM(S): 500 TABLET, COATED ORAL at 21:55

## 2024-11-24 NOTE — ED ADULT NURSE NOTE - OBJECTIVE STATEMENT
Pt is 79 year old female c/o mechanical fall in kitchen at home onto left side. Pt fell on tile floor from standing height; c/o left hip and arm / shoulder pain; pt denies LOC/ no blood thinner or active bleeding. PT states she had hip replacement 2 years ago and her hip "feels like something is popping out". Pt denies CP/ SOB. Pt denies numbness/ tingling in hand or feet

## 2024-11-24 NOTE — ED PROVIDER NOTE - PROGRESS NOTE DETAILS
kathy - received on sign out pending XR.   XR wrist w distsal radius fracture  XR hip w periprosthetic fracture.   ortho consulted.   admission labs unremarkable. kathy - received on sign out pending XR.   XR wrist w distsal radius fracture  XR hip w periprosthetic fracture.   ortho consulted. splinted wrist, post reduction XR ordered  hip fracture nonoperative. will admit to medicine for pain control / PT. ortho to continue to follow   admission labs unremarkable.

## 2024-11-24 NOTE — ED ADULT TRIAGE NOTE - INTERNATIONAL TRAVEL
Patient presents to [facility] with his mother, Alexa. Patient given a glass of water.    No

## 2024-11-24 NOTE — ED PROVIDER NOTE - OBJECTIVE STATEMENT
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 diff, here for mechanical fall in kitchen. Now with left hip/femur and left hand/wrist pain. Denies head strike or LOC. Took her carvidopa-levodopa today and not due for next dose until the morning (has meds with her). Denies numbness/tingling. Declining analgesia.

## 2024-11-24 NOTE — ED PROVIDER NOTE - MUSCULOSKELETAL, MLM
LLE: ttp of left femur, Distal pulse palpable, SILT, able to wiggle toes. LUE: ttp of left wrist and hand. Radial pulse palpable, SILT. Able to make thumbs up, pincer grasp, okay signs. NV intact in both.

## 2024-11-24 NOTE — ED PROVIDER NOTE - CLINICAL SUMMARY MEDICAL DECISION MAKING FREE TEXT BOX
79F w HLD, Parkinson's disease, displaced L FNF s/p left hemiarthroplasty by Dr. Loja 2018, hospital course c/b c. diff, here for mechanical fall in kitchen. Now with left hip/femur and left hand/wrist pain. Denies head strike or LOC. Took her carvidopa-levodopa today and not due for next dose until the morning (has meds with her). Denies numbness/tingling. Declining analgesia.    diff dx: left wrist/hand fracture and left hip vs femur displacement vs fx vs hardware malpositioning   plan: labs, xr, ortho recs, admit.

## 2024-11-25 ENCOUNTER — INPATIENT (INPATIENT)
Facility: HOSPITAL | Age: 79
LOS: 0 days | Discharge: NURSING, REHAB OR LONG-TERM CARE | DRG: 560 | End: 2024-11-26
Attending: STUDENT IN AN ORGANIZED HEALTH CARE EDUCATION/TRAINING PROGRAM | Admitting: STUDENT IN AN ORGANIZED HEALTH CARE EDUCATION/TRAINING PROGRAM
Payer: MEDICARE

## 2024-11-25 DIAGNOSIS — I10 ESSENTIAL (PRIMARY) HYPERTENSION: ICD-10-CM

## 2024-11-25 DIAGNOSIS — Z86.69 PERSONAL HISTORY OF OTHER DISEASES OF THE NERVOUS SYSTEM AND SENSE ORGANS: ICD-10-CM

## 2024-11-25 DIAGNOSIS — S52.502A UNSPECIFIED FRACTURE OF THE LOWER END OF LEFT RADIUS, INITIAL ENCOUNTER FOR CLOSED FRACTURE: ICD-10-CM

## 2024-11-25 DIAGNOSIS — Z29.9 ENCOUNTER FOR PROPHYLACTIC MEASURES, UNSPECIFIED: ICD-10-CM

## 2024-11-25 DIAGNOSIS — M97.8XXA PERIPROSTHETIC FRACTURE AROUND OTHER INTERNAL PROSTHETIC JOINT, INITIAL ENCOUNTER: ICD-10-CM

## 2024-11-25 DIAGNOSIS — D72.829 ELEVATED WHITE BLOOD CELL COUNT, UNSPECIFIED: ICD-10-CM

## 2024-11-25 LAB
ANION GAP SERPL CALC-SCNC: 7 MMOL/L — SIGNIFICANT CHANGE UP (ref 5–17)
APPEARANCE UR: CLEAR — SIGNIFICANT CHANGE UP
BASOPHILS # BLD AUTO: 0.04 K/UL — SIGNIFICANT CHANGE UP (ref 0–0.2)
BASOPHILS NFR BLD AUTO: 0.3 % — SIGNIFICANT CHANGE UP (ref 0–2)
BILIRUB UR-MCNC: NEGATIVE — SIGNIFICANT CHANGE UP
BUN SERPL-MCNC: 36 MG/DL — HIGH (ref 7–23)
CALCIUM SERPL-MCNC: 8.9 MG/DL — SIGNIFICANT CHANGE UP (ref 8.4–10.5)
CHLORIDE SERPL-SCNC: 101 MMOL/L — SIGNIFICANT CHANGE UP (ref 96–108)
CO2 SERPL-SCNC: 25 MMOL/L — SIGNIFICANT CHANGE UP (ref 22–31)
COLOR SPEC: YELLOW — SIGNIFICANT CHANGE UP
CREAT SERPL-MCNC: 1.25 MG/DL — SIGNIFICANT CHANGE UP (ref 0.5–1.3)
DIFF PNL FLD: NEGATIVE — SIGNIFICANT CHANGE UP
EGFR: 44 ML/MIN/1.73M2 — LOW
EOSINOPHIL # BLD AUTO: 0.02 K/UL — SIGNIFICANT CHANGE UP (ref 0–0.5)
EOSINOPHIL NFR BLD AUTO: 0.2 % — SIGNIFICANT CHANGE UP (ref 0–6)
GLUCOSE SERPL-MCNC: 124 MG/DL — HIGH (ref 70–99)
GLUCOSE UR QL: NEGATIVE MG/DL — SIGNIFICANT CHANGE UP
HCT VFR BLD CALC: 32.7 % — LOW (ref 34.5–45)
HGB BLD-MCNC: 10.6 G/DL — LOW (ref 11.5–15.5)
IMM GRANULOCYTES NFR BLD AUTO: 0.4 % — SIGNIFICANT CHANGE UP (ref 0–0.9)
KETONES UR-MCNC: NEGATIVE MG/DL — SIGNIFICANT CHANGE UP
LEUKOCYTE ESTERASE UR-ACNC: ABNORMAL
LYMPHOCYTES # BLD AUTO: 1.92 K/UL — SIGNIFICANT CHANGE UP (ref 1–3.3)
LYMPHOCYTES # BLD AUTO: 16.6 % — SIGNIFICANT CHANGE UP (ref 13–44)
MAGNESIUM SERPL-MCNC: 2.2 MG/DL — SIGNIFICANT CHANGE UP (ref 1.6–2.6)
MCHC RBC-ENTMCNC: 29.3 PG — SIGNIFICANT CHANGE UP (ref 27–34)
MCHC RBC-ENTMCNC: 32.4 G/DL — SIGNIFICANT CHANGE UP (ref 32–36)
MCV RBC AUTO: 90.3 FL — SIGNIFICANT CHANGE UP (ref 80–100)
MONOCYTES # BLD AUTO: 1.24 K/UL — HIGH (ref 0–0.9)
MONOCYTES NFR BLD AUTO: 10.7 % — SIGNIFICANT CHANGE UP (ref 2–14)
NEUTROPHILS # BLD AUTO: 8.32 K/UL — HIGH (ref 1.8–7.4)
NEUTROPHILS NFR BLD AUTO: 71.8 % — SIGNIFICANT CHANGE UP (ref 43–77)
NITRITE UR-MCNC: NEGATIVE — SIGNIFICANT CHANGE UP
NRBC # BLD: 0 /100 WBCS — SIGNIFICANT CHANGE UP (ref 0–0)
OSMOLALITY UR: 589 MOSM/KG — SIGNIFICANT CHANGE UP (ref 300–900)
PH UR: 6.5 — SIGNIFICANT CHANGE UP (ref 5–8)
PHOSPHATE SERPL-MCNC: 3.8 MG/DL — SIGNIFICANT CHANGE UP (ref 2.5–4.5)
PLATELET # BLD AUTO: 254 K/UL — SIGNIFICANT CHANGE UP (ref 150–400)
POTASSIUM SERPL-MCNC: 5.1 MMOL/L — SIGNIFICANT CHANGE UP (ref 3.5–5.3)
POTASSIUM SERPL-SCNC: 5.1 MMOL/L — SIGNIFICANT CHANGE UP (ref 3.5–5.3)
PROT UR-MCNC: NEGATIVE MG/DL — SIGNIFICANT CHANGE UP
RBC # BLD: 3.62 M/UL — LOW (ref 3.8–5.2)
RBC # FLD: 13.3 % — SIGNIFICANT CHANGE UP (ref 10.3–14.5)
SODIUM SERPL-SCNC: 133 MMOL/L — LOW (ref 135–145)
SODIUM UR-SCNC: 40 MMOL/L — SIGNIFICANT CHANGE UP
SP GR SPEC: 1.02 — SIGNIFICANT CHANGE UP (ref 1–1.03)
UROBILINOGEN FLD QL: 0.2 MG/DL — SIGNIFICANT CHANGE UP (ref 0.2–1)
UUN UR-MCNC: 1001 MG/DL — SIGNIFICANT CHANGE UP
WBC # BLD: 11.59 K/UL — HIGH (ref 3.8–10.5)
WBC # FLD AUTO: 11.59 K/UL — HIGH (ref 3.8–10.5)

## 2024-11-25 PROCEDURE — 99223 1ST HOSP IP/OBS HIGH 75: CPT | Mod: GC,AI

## 2024-11-25 PROCEDURE — 73110 X-RAY EXAM OF WRIST: CPT | Mod: 26,LT

## 2024-11-25 RX ORDER — 0.9 % SODIUM CHLORIDE 0.9 %
1000 INTRAVENOUS SOLUTION INTRAVENOUS
Refills: 0 | Status: DISCONTINUED | OUTPATIENT
Start: 2024-11-25 | End: 2024-11-26

## 2024-11-25 RX ORDER — ONDANSETRON HYDROCHLORIDE 4 MG/1
4 TABLET, FILM COATED ORAL ONCE
Refills: 0 | Status: COMPLETED | OUTPATIENT
Start: 2024-11-25 | End: 2024-11-25

## 2024-11-25 RX ORDER — HYDROMORPHONE HYDROCHLORIDE 2 MG/1
0.5 TABLET ORAL EVERY 8 HOURS
Refills: 0 | Status: DISCONTINUED | OUTPATIENT
Start: 2024-11-25 | End: 2024-11-25

## 2024-11-25 RX ORDER — CARBIDOPA AND LEVODOPA 10; 100 MG/1; MG/1
1 TABLET ORAL
Refills: 0 | DISCHARGE

## 2024-11-25 RX ORDER — SELEGILINE HYDROCHLORIDE 5 MG/1
5 CAPSULE ORAL
Refills: 0 | Status: DISCONTINUED | OUTPATIENT
Start: 2024-11-25 | End: 2024-11-26

## 2024-11-25 RX ORDER — INFLUENZA VIRUS VACCINE 15; 15; 15; 15 UG/.5ML; UG/.5ML; UG/.5ML; UG/.5ML
0.5 SUSPENSION INTRAMUSCULAR ONCE
Refills: 0 | Status: DISCONTINUED | OUTPATIENT
Start: 2024-11-25 | End: 2024-11-26

## 2024-11-25 RX ORDER — OXYCODONE HYDROCHLORIDE 30 MG/1
5 TABLET ORAL EVERY 8 HOURS
Refills: 0 | Status: DISCONTINUED | OUTPATIENT
Start: 2024-11-25 | End: 2024-11-26

## 2024-11-25 RX ORDER — ACETAMINOPHEN 500MG 500 MG/1
650 TABLET, COATED ORAL EVERY 6 HOURS
Refills: 0 | Status: DISCONTINUED | OUTPATIENT
Start: 2024-11-25 | End: 2024-11-26

## 2024-11-25 RX ORDER — CARBIDOPA TABLETS, 25 MG 25 MG/1
25 TABLET ORAL
Refills: 0 | Status: DISCONTINUED | OUTPATIENT
Start: 2024-11-25 | End: 2024-11-26

## 2024-11-25 RX ORDER — ENOXAPARIN SODIUM 30 MG/.3ML
40 INJECTION SUBCUTANEOUS EVERY 24 HOURS
Refills: 0 | Status: DISCONTINUED | OUTPATIENT
Start: 2024-11-25 | End: 2024-11-26

## 2024-11-25 RX ORDER — LOSARTAN POTASSIUM 100 MG/1
100 TABLET, FILM COATED ORAL DAILY
Refills: 0 | Status: DISCONTINUED | OUTPATIENT
Start: 2024-11-25 | End: 2024-11-26

## 2024-11-25 RX ORDER — CARBIDOPA AND LEVODOPA 10; 100 MG/1; MG/1
2 TABLET ORAL
Refills: 0 | Status: DISCONTINUED | OUTPATIENT
Start: 2024-11-25 | End: 2024-11-26

## 2024-11-25 RX ORDER — SENNOSIDES 8.6 MG
1 TABLET ORAL EVERY 24 HOURS
Refills: 0 | Status: DISCONTINUED | OUTPATIENT
Start: 2024-11-25 | End: 2024-11-26

## 2024-11-25 RX ORDER — OXYCODONE HYDROCHLORIDE 30 MG/1
10 TABLET ORAL EVERY 8 HOURS
Refills: 0 | Status: DISCONTINUED | OUTPATIENT
Start: 2024-11-25 | End: 2024-11-26

## 2024-11-25 RX ORDER — POLYETHYLENE GLYCOL 3350 17 G/17G
17 POWDER, FOR SOLUTION ORAL EVERY 24 HOURS
Refills: 0 | Status: DISCONTINUED | OUTPATIENT
Start: 2024-11-25 | End: 2024-11-26

## 2024-11-25 RX ORDER — ACETAMINOPHEN 500MG 500 MG/1
650 TABLET, COATED ORAL EVERY 6 HOURS
Refills: 0 | Status: DISCONTINUED | OUTPATIENT
Start: 2024-11-25 | End: 2024-11-25

## 2024-11-25 RX ORDER — HYDROMORPHONE HYDROCHLORIDE 2 MG/1
0.2 TABLET ORAL EVERY 8 HOURS
Refills: 0 | Status: DISCONTINUED | OUTPATIENT
Start: 2024-11-25 | End: 2024-11-25

## 2024-11-25 RX ORDER — HYDROMORPHONE HYDROCHLORIDE 2 MG/1
0.5 TABLET ORAL ONCE
Refills: 0 | Status: DISCONTINUED | OUTPATIENT
Start: 2024-11-25 | End: 2024-11-25

## 2024-11-25 RX ADMIN — SELEGILINE HYDROCHLORIDE 5 MILLIGRAM(S): 5 CAPSULE ORAL at 13:11

## 2024-11-25 RX ADMIN — ENOXAPARIN SODIUM 40 MILLIGRAM(S): 30 INJECTION SUBCUTANEOUS at 07:12

## 2024-11-25 RX ADMIN — ACETAMINOPHEN 500MG 650 MILLIGRAM(S): 500 TABLET, COATED ORAL at 20:09

## 2024-11-25 RX ADMIN — ACETAMINOPHEN 500MG 650 MILLIGRAM(S): 500 TABLET, COATED ORAL at 19:10

## 2024-11-25 RX ADMIN — Medication 500 MILLILITER(S): at 12:36

## 2024-11-25 RX ADMIN — ACETAMINOPHEN 500MG 650 MILLIGRAM(S): 500 TABLET, COATED ORAL at 15:30

## 2024-11-25 RX ADMIN — Medication 500 MILLILITER(S): at 10:01

## 2024-11-25 RX ADMIN — CARBIDOPA AND LEVODOPA 2 CAPSULE(S): 10; 100 TABLET ORAL at 13:11

## 2024-11-25 RX ADMIN — CARBIDOPA TABLETS, 25 MG 25 MILLIGRAM(S): 25 TABLET ORAL at 17:25

## 2024-11-25 RX ADMIN — HYDROMORPHONE HYDROCHLORIDE 0.5 MILLIGRAM(S): 2 TABLET ORAL at 00:37

## 2024-11-25 RX ADMIN — ACETAMINOPHEN 500MG 650 MILLIGRAM(S): 500 TABLET, COATED ORAL at 14:30

## 2024-11-25 RX ADMIN — CARBIDOPA AND LEVODOPA 2 CAPSULE(S): 10; 100 TABLET ORAL at 17:25

## 2024-11-25 RX ADMIN — ONDANSETRON HYDROCHLORIDE 4 MILLIGRAM(S): 4 TABLET, FILM COATED ORAL at 00:37

## 2024-11-25 RX ADMIN — CARBIDOPA TABLETS, 25 MG 25 MILLIGRAM(S): 25 TABLET ORAL at 13:11

## 2024-11-25 NOTE — H&P ADULT - TIME BILLING
chart review, patient interview/exam, review of labs/imaging, management of medical conditions, counseling/educating patient and family, documentation; excludes teaching and separately reported services

## 2024-11-25 NOTE — DISCHARGE NOTE PROVIDER - NSDCFUADDINST_GEN_ALL_CORE_FT
#Periprosthetic fracture of hip  Seen by ortho in the ED, no surgical intervention at this time. Immobilized and flat. Physical therapy worked with patient, recommended PHYLLIS  - PWB 40% with walker assistance to the LLE. Patient must get hip xrays done in 10 days on 12/6/24.   - Pain control with oxy 5 q8h PRN for moderate pain and oxy 10 q8h PRN for severe pain    #Distal radius fracture, left  s/p closure, reduction, and immobilization in splint.  - PAT CURRIE. Patient must get wrist xrays in 10 days on 12/6/24.   - f/u Outpatient Dr. Quintero

## 2024-11-25 NOTE — OCCUPATIONAL THERAPY INITIAL EVALUATION ADULT - MODIFIED CLINICAL TEST OF SENSORY INTEGRATION IN BALANCE TEST
Pt able to take 2 side steps by shuffling with max x2 person assist using LUE platform walker, max visual and verbal cues for sequencing

## 2024-11-25 NOTE — H&P ADULT - NSHPLABSRESULTS_GEN_ALL_CORE
LABS:                         11.9   11.87 )-----------( 272      ( 24 Nov 2024 21:47 )             36.6     11-24    134[L]  |  101  |  34[H]  ----------------------------<  113[H]  5.2   |  24  |  1.09    Ca    9.1      24 Nov 2024 21:47      PT/INR - ( 24 Nov 2024 21:47 )   PT: 11.2 sec;   INR: 0.96          PTT - ( 24 Nov 2024 21:47 )  PTT:23.0 sec  Urinalysis Basic - ( 24 Nov 2024 21:47 )    Color: x / Appearance: x / SG: x / pH: x  Gluc: 113 mg/dL / Ketone: x  / Bili: x / Urobili: x   Blood: x / Protein: x / Nitrite: x   Leuk Esterase: x / RBC: x / WBC x   Sq Epi: x / Non Sq Epi: x / Bacteria: x                RADIOLOGY, EKG & ADDITIONAL TESTS:

## 2024-11-25 NOTE — H&P ADULT - PROBLEM SELECTOR PLAN 4
WBC of 11 on admission likely 2/2 reactive from fall and fracture. No signs of systemic infection.  - Trend leukocytosis.

## 2024-11-25 NOTE — PHYSICAL THERAPY INITIAL EVALUATION ADULT - RANGE OF MOTION EXAMINATION, REHAB EVAL
Limited L shoulder flexion; limited LLE ROM 2/2 pain/Right UE ROM was WNL (within normal limits)/Right LE ROM was WNL (within normal limits)/deficits as listed below

## 2024-11-25 NOTE — PATIENT PROFILE ADULT - VISION (WITH CORRECTIVE LENSES IF THE PATIENT USUALLY WEARS THEM):
Forms was faxed on 6/9/22 and refaxed today
Partially impaired: cannot see medication labels or newsprint, but can see obstacles in path, and the surrounding layout; can count fingers at arm's length

## 2024-11-25 NOTE — CONSULT NOTE ADULT - ASSESSMENT
{\rtf1\jswyca83554\ansi\dbqsaje0716\ftnbj\uc1\deff0  {\fonttbl{\f0 \fnil Segoe UI;}{\f1 \fnil \fcharset0 Segoe UI;}{\f2 \fnil Times New Harsh;}}  {\colortbl ;\otx501\gnmka665\clpo341 ;\red0\green0\blue0 ;\red0\green0\ltot073 ;\red0\green0\blue0 ;}  {\stylesheet{\f0\fs20 Normal;}{\cs1 Default Paragraph Font;}{\cs2\f0\fs16 Line Number;}{\cs3\f2\fs24\ul\cf3 Hyperlink;}}  {\*\revtbl{Unknown;}}  \ivmfgx10805\fluuer80476\ygmsz0495\uypbh9121\ivaqr8003\yichn2420\arlfvvl138\lrgwjff512\nogrowautofit\cnadfq011\formshade\nofeaturethrottle1\dntblnsbdb\fet4\aendnotes\aftnnrlc\pgbrdrhead\pgbrdrfoot  \sectd\tvkbxp70801\pjwunx87346\guttersxn0\kmuchlae0653\benumvhu3268\fpmzrmbq4710\gsviqdvh7969\bmftszq165\ukobqgn869\sbkpage\pgncont\pgndec  \plain\plain\f0\fs24\ql\plain\f0\fs24\plain\f0\fs20\bkhk5523\hich\f0\dbch\f0\loch\f0\fs20\par  I M\par  \par  79 y o F with PMH of Parkinsons, htn, prior left hemiarthroplasty in 2021, presents after a mechanical fall, admitted for left hip periprosthetic fracture. \par  \par  \plain\f1\fs20\ezqn7872\hich\f1\dbch\f1\loch\f1\cf2\fs20\ul{\field{\*\fldinst HYPERLINK [id number] }{\fldrslt Problem/Plan - 1:}}\plain\f0\fs20\rhdk7872\hich\f0\dbch\f0\loch\f0\fs20\ql\par  \'b7  {\*\bkmkstart za10331665433}{\*\bkmkend hf57215931512}Problem: {\*\bkmkstart nx87409233172}{\*\bkmkend li59103709860}Periprosthetic fracture of hip. \par  \'b7  {\*\bkmkstart nv65633243295}{\*\bkmkend zk61842320635}Plan: {\*\bkmkstart ix10157793738}{\*\bkmkend pv82026093799}Seen by ortho in the ED, no surgical intervention at this time. Immobilized and flat.\par  - PT in the AM\par  - NWB to the LLE. \par  - ortho to f/u in the AM. \par  - Pain control- dilaudid 0.2 for moderate, 0.5 for severe.\par  \par  \plain\f1\fs20\mghw2575\hich\f1\dbch\f1\loch\f1\cf2\fs20\ul{\field{\*\fldinst HYPERLINK [id number] }{\fldrslt Problem/Plan - 2:}}\plain\f0\fs20\jhxk4237\hich\f0\dbch\f0\loch\f0\fs20\ql\par  \'b7  {\*\bkmkstart ml87602100116}{\*\bkmkend hu31505356881}Problem: {\*\bkmkstart eg68251830549}{\*\bkmkend cd43741113889}Distal radius fracture, left. \par  \'b7  {\*\bkmkstart gz84828108507}{\*\bkmkend bg45780332165}Plan: {\*\bkmkstart go92555562489}{\*\bkmkend hn82667349979}s/p closure, reduction, and immobilization in splint.\par  - NWB LUE\par  - f/u post reduction wrist XR. \par  - f.u Outpatient Dr. Quintero.\par  \par  \plain\f1\fs20\zauk6317\hich\f1\dbch\f1\loch\f1\cf2\fs20\ul{\field{\*\fldinst HYPERLINK [id number] }{\fldrslt Problem/Plan - 3:}}\plain\f0\fs20\rplu4394\hich\f0\dbch\f0\loch\f0\fs20\ql\par  \'b7  {\*\bkmkstart qa07821360036}{\*\bkmkend vq32845391252}Problem: {\*\bkmkstart lc55736074978}{\*\bkmkend sw87375298381}History of Parkinson disease. \par  \'b7  {\*\bkmkstart yt81510140246}{\*\bkmkend bt98216209310}Plan: {\*\bkmkstart iu82058804918}{\*\bkmkend hd67327924752}Home medications: Domperidone 10mgTID, Carbidopa 25 TID, Selegiline 5mg BID, Rytary BID.\par  - cw home medications.\plain\f1\fs20\gjbq4903\hich\f1\dbch\f1\loch\f1\cf2\fs20\strike\plain\f0\fs20\fvgj1148\hich\f0\dbch\f0\loch\f0\fs20\par  \par  \plain\f1\fs20\rkyc4246\hich\f1\dbch\f1\loch\f1\cf2\fs20\ul{\field{\*\fldinst HYPERLINK [id number] }{\fldrslt Problem/Plan - 4:}}\plain\f0\fs20\clue3414\hich\f0\dbch\f0\loch\f0\fs20\ql\par  \'b7  {\*\bkmkstart oq94206255576}{\*\bkmkend te40198848873}Problem: {\*\bkmkstart lf64799984018}{\*\bkmkend zq14484526202}Leukocytosis. \par  \'b7  {\*\bkmkstart ab50149338459}{\*\bkmkend bl47714499466}Plan: {\*\bkmkstart ng10282135213}{\*\bkmkend ak75062147206}WBC of 11 on admission likely 2/2 reactive from fall and fracture. No signs of systemic infection.\par  - Trend leukocytosis.\par  \par  \plain\f1\fs20\sazg3740\hich\f1\dbch\f1\loch\f1\cf2\fs20\ul{\field{\*\fldinst HYPERLINK [id number] }{\fldrslt Problem/Plan - 5:}}\plain\f0\fs20\ixro7627\hich\f0\dbch\f0\loch\f0\fs20\ql\par  \'b7  {\*\bkmkstart mq95602228634}{\*\bkmkend uf60472338214}Problem: {\*\bkmkstart cz41495127009}{\*\bkmkend dw45378671508}Hypertension. \par  \'b7  {\*\bkmkstart ez38388119157}{\*\bkmkend kk73444754498}Plan: {\*\bkmkstart re07616414364}{\*\bkmkend hh82862473373}c/w home losartan 100mg qd.\par  \par  \plain\f1\fs20\zosx9190\hich\f1\dbch\f1\loch\f1\cf2\fs20\ul{\field{\*\fldinst HYPERLINK [id number] }{\fldrslt Problem/Plan - 6:}}\plain\f0\fs20\fqpg5185\hich\f0\dbch\f0\loch\f0\fs20\ql\par  \'b7  {\*\bkmkstart oq08656765207}{\*\bkmkend ic70371580609}Problem: {\*\bkmkstart wq30899849029}{\*\bkmkend qh88958022220}Prophylactic measure. \par  \'b7  {\*\bkmkstart tc48496899730}{\*\bkmkend ls91927743944}Plan: {\*\bkmkstart oq04432413255}{\*\bkmkend hy00675254323}F: None \par  E: Replete as necessary K>4 Mg>2\par  N: Full diet \par  DVT Prophylaxis: Lovenox 40mg daily \par  GI prophylaxis: None \par  CODE STATUS: FULL\par  DISPO: RMF.\par  \par  }

## 2024-11-25 NOTE — DISCHARGE NOTE PROVIDER - HOSPITAL COURSE
#Discharge: do not delete    79F with PMH of Parkinsons, htn, prior left hemiarthroplasty in 2021, presents after a mechanical fall - tripped over her feet onto her wrist and hip.  She denies a head strike or loss of consciousness. This is the second time she's fallen in 3 years- last time was her prior hip fracture. She denies fevers, chills, shortness of breath, severe pain.     ED Course (11/25)  T97 HR 92 /85 RR18 Sat 96% RA  Labs: WBC 11 Na 134   Imaging: Wrist XR: Distal Radius Fracture L. Hip XR: Fracture, femur.  Interventions: Tylenol, dilaudid, zofran.   Ortho: Reduced the wrist. No surgical intervention of the hip.     On admission (11/25), d/c dilaudid and started on oxy 5 an oxy 10 PRN. started senna/miralax. LR 1 L over 2 hours x2. Pharmacy confirmed home medications and patient continued with home regiment for Parkinsons treatment: Selegiline 5mg 1 tab, twice daily; carbidopa 25mg 1 pill TID, Rytarvy 23.75-95 MG CPCR 2 pills TID, Domperidone (not available in the [place], gets from pharmacy in [place]) 10mg oral pill TID. Started on Lorsartan for HTN. PT and OT consulted and obtained orthostatic BP measurements...    Problem List/Main Diagnoses:     #Periprosthetic fracture of hip.   - PT in the AM  - NWB to the LLE.   - f/u Ortho recs - Seen by ortho in the ED, no surgical intervention at this time. Immobilized and flat.  - Pain control in ED dilaudid 0.2 for moderate, 0.5 for severe. Started on oxy 5 an oxy 10 PRN on admission    #Distal radius fracture, left.   ·  Plan: s/p closure, reduction, and immobilization in splint.  - NWB LUE  - f/u post reduction wrist XR.   - f.u Outpatient Dr. Quintero.    #History of Parkinson disease.   ·  Plan: Home medications: Domperidone 10mgTID, Carbidopa 25 TID, Selegiline 5mg BID, Rytary BID.  - cw home medications.    #Leukocytosis.   ·  Plan: WBC of 11 on admission likely 2/2 reactive from fall and fracture. No signs of systemic infection.  - Trend leukocytosis.    #Hypertension.   ·  Plan: c/w home losartan 100mg qd.    #Prophylactic measure.   E: Replete as necessary K>4 Mg>2  N: Full diet   DVT Prophylaxis: Lovenox 40mg daily   GI prophylaxis: None   CODE STATUS: FULL  DISPO: [facility].    New medications/therapies:   New lines/hardware:  Labs to be followed outpatient:   Exam to be followed outpatient:     Discharge plan: discharge to _____    Physical Exam Upon Discharge:    PHYSICAL EXAM:  Constitutional: NAD, comfortable in bed.  HEENT: NC/AT, PERRLA, EOMI, no conjunctival pallor or scleral icterus, MMM  Neck: Supple, no JVD  Respiratory: CTA B/L. No w/r/r.   Cardiovascular: RRR, normal S1 and S2, no m/r/g.   Gastrointestinal: +BS, soft NTND, no guarding or rebound tenderness, no palpable masses   Extremities: L. arm in mold and cast. L leg straight with no cast/brace. No hematoma seen. Able to move fingers and toes.  Vascular: Pulses equal and strong throughout.   Neurological: AAOx3, no CN deficits, strength and sensation intact throughout.   Skin: No gross skin abnormalities or rashes 79F with PMH of Parkinson's, htn, prior left hemiarthroplasty in 2021, presents after a mechanical fall, admitted for treatment of left hip periprosthetic fracture.     Hospital Course (by problem):    #Periprosthetic fracture of hip  Seen by ortho in the ED, no surgical intervention at this time. Immobilized and flat. Physical therapy worked with patient, recommended PHYLLIS  - NWB to the LLE.   - Pain control with oxy 5 q8h PRN for moderate pain and oxy 10 q8h PRN for severe pain    #Distal radius fracture, left  s/p closure, reduction, and immobilization in splint.  - NWB LUE  - f/u Outpatient Dr. Quintero    #History of Parkinson disease  Home medications: Domperidone 10mgTID, Carbidopa 25 TID, Selegiline 5mg BID, Rytary BID.  -  home medications    #Hypertension  c/w home losartan 100mg qd.    New medications: None  Changes to old medications: None  Medications to be stopped: None    Discharge plan: discharge to [facility]    Physical Exam Upon Discharge:  Constitutional: NAD, comfortable in bed.  HEENT: NC/AT, PERRLA, EOMI, no conjunctival pallor or scleral icterus, MMM  Neck: Supple, no JVD  Respiratory: CTA B/L. No w/r/r.   Cardiovascular: RRR, normal S1 and S2, no m/r/g.   Gastrointestinal: +BS, soft NTND, no guarding or rebound tenderness, no palpable masses   Extremities: L. arm in mold and cast. No hematoma seen. Able to move fingers and toes.  Vascular: Pulses equal and strong throughout.   Neurological: AAOx3, no CN deficits, strength and sensation intact throughout.   Skin: No gross skin abnormalities or rashes 79F with PMH of Parkinson's, htn, prior left hemiarthroplasty in 2021, presents after a mechanical fall, admitted for treatment of left hip periprosthetic fracture.     Hospital Course (by problem):    #Periprosthetic fracture of hip  Seen by ortho in the ED, no surgical intervention at this time. Immobilized and flat. Physical therapy worked with patient, recommended PHYLLIS  - PWB 40% with walker assistance to the LLE. Patient must get hip xrays done in 10 days on 12/6/24.   - Pain control with oxy 5 q8h PRN for moderate pain and oxy 10 q8h PRN for severe pain    #Distal radius fracture, left  s/p closure, reduction, and immobilization in splint.  - NWB LUE. Patient must get wrist xrays in 10 days on 12/6/24.   - f/u Outpatient Dr. Quintero    #History of Parkinson disease  Home medications: Domperidone 10mgTID, Carbidopa 25 TID, Selegiline 5mg BID, Rytary BID.  - cw home medications    #Hypertension  c/w home losartan 100mg qd.    New medications: None  Changes to old medications: None  Medications to be stopped: None    Discharge plan: discharge to [facility]    Physical Exam Upon Discharge:  Constitutional: NAD, comfortable in bed.  HEENT: NC/AT, PERRLA, EOMI, no conjunctival pallor or scleral icterus, MMM  Neck: Supple, no JVD  Respiratory: CTA B/L. No w/r/r.   Cardiovascular: RRR, normal S1 and S2, no m/r/g.   Gastrointestinal: +BS, soft NTND, no guarding or rebound tenderness, no palpable masses   Extremities: L. arm in mold and cast. No hematoma seen. Able to move fingers and toes.  Vascular: Pulses equal and strong throughout.   Neurological: AAOx3, no CN deficits, strength and sensation intact throughout.   Skin: No gross skin abnormalities or rashes 79F with PMH of Parkinson's, htn, prior left hemiarthroplasty in 2021, presents after a mechanical fall, admitted for treatment of left hip periprosthetic fracture.     Hospital Course (by problem):    #Periprosthetic fracture of hip  Seen by ortho in the ED, no surgical intervention at this time. Immobilized and flat. Physical therapy worked with patient, recommended PHYLLIS  - PWB 40% with walker assistance to the LLE. Patient must get hip xrays done in 10 days on 12/6/24.   - Pain control with oxy 5 q8h PRN for moderate pain and oxy 10 q8h PRN for severe pain    #Distal radius fracture, left  s/p closure, reduction, and immobilization in splint.  - NWB LUE. Patient must get wrist xrays in 10 days on 12/6/24.   - f/u Outpatient Dr. Quintero    #History of Parkinson disease  Home medications: Domperidone 10mgTID, Carbidopa 25 TID, Selegiline 5mg BID, Rytary BID.  - cw home medications    #Hypertension  c/w home losartan 100mg qd.    New medications: Metoclopramide  Changes to old medications: None  Medications to be stopped: Domperidone    Discharge plan: discharge to [facility]    Physical Exam Upon Discharge:  Constitutional: NAD, comfortable in bed.  HEENT: NC/AT, PERRLA, EOMI, no conjunctival pallor or scleral icterus, MMM  Neck: Supple, no JVD  Respiratory: CTA B/L. No w/r/r.   Cardiovascular: RRR, normal S1 and S2, no m/r/g.   Gastrointestinal: +BS, soft NTND, no guarding or rebound tenderness, no palpable masses   Extremities: L. arm in mold and cast. No hematoma seen. Able to move fingers and toes.  Vascular: Pulses equal and strong throughout.   Neurological: AAOx3, no CN deficits, strength and sensation intact throughout.   Skin: No gross skin abnormalities or rashes 79F with PMH of Parkinson's, htn, prior left hemiarthroplasty in 2021, presents after a mechanical fall, admitted for treatment of left hip periprosthetic fracture.     Hospital Course (by problem):    #Periprosthetic fracture of hip  Seen by ortho in the ED, no surgical intervention at this time. Immobilized and flat. Physical therapy worked with patient, recommended PHYLLIS  - PWB 40% with walker assistance to the LLE. Patient must get hip xrays done in 10 days on 12/6/24.   - Pain control with oxy 5 q8h PRN for moderate pain and oxy 10 q8h PRN for severe pain    #Distal radius fracture, left  s/p closure, reduction, and immobilization in splint.  - NWB LUE. Patient must get wrist xrays in 10 days on 12/6/24.   - f/u Outpatient Dr. Quintero    #History of Parkinson disease  Home medications: Domperidone 10mgTID, Carbidopa 25 TID, Selegiline 5mg BID, Rytary BID.  QTc 399  - cw carbidopa, selegiline, rytary  -domperidone switched to Metoclopramide 10 mg q6h    #Hypertension  c/w home losartan 100mg qd.    New medications: Metoclopramide  Changes to old medications: None  Medications to be stopped: Domperidone    Discharge plan: discharge to [facility]    Physical Exam Upon Discharge:  Constitutional: NAD, comfortable in bed.  HEENT: NC/AT, PERRLA, EOMI, no conjunctival pallor or scleral icterus, MMM  Neck: Supple, no JVD  Respiratory: CTA B/L. No w/r/r.   Cardiovascular: RRR, normal S1 and S2, no m/r/g.   Gastrointestinal: +BS, soft NTND, no guarding or rebound tenderness, no palpable masses   Extremities: L. arm in mold and cast. No hematoma seen. Able to move fingers and toes.  Vascular: Pulses equal and strong throughout.   Neurological: AAOx3, no CN deficits, strength and sensation intact throughout.   Skin: No gross skin abnormalities or rashes

## 2024-11-25 NOTE — PHYSICAL THERAPY INITIAL EVALUATION ADULT - GENERAL OBSERVATIONS, REHAB EVAL
MATEUS Saha aware of intent to eval. Patient received semi-supine, +LUE ace bandage C/D/I, +heplock, +primafit. MATEUS Saha aware of intent to eval. Patient received semi-supine, +LUE ace bandage C/D/I, +LUE splint, +LUE sling, +heplock, +primafit.

## 2024-11-25 NOTE — H&P ADULT - PROBLEM SELECTOR PLAN 6
F: None   E: Replete as necessary K>4 Mg>2  N: Full diet   DVT Prophylaxis: Lovenox 40mg daily   GI prophylaxis: None   CODE STATUS: FULL  DISPO: [facility]

## 2024-11-25 NOTE — CONSULT NOTE ADULT - SUBJECTIVE AND OBJECTIVE BOX
Patient is a 79y old  Female who presents with a chief complaint of     HPI:  79F with PMH of Parkinsons, htn, prior left hemiarthroplasty in 2021, presents after a mechanical fall - tripped over her feet onto her wrist and hip.  She denies a head strike or loss of consciousness. This is the second time she's fallen in 3 years- last time was her prior hip fracture. She denies fevers, chills, shortness of breath, severe pain.     ED Course  T97 HR 92 /85 RR18 Sat 96% RA  Labs: WBC 11 Na 134   Imaging: Wrist XR: Distal Radius Fracture L. Hip XR: Fracture, femur.  Interventions: Tylenol, dilaudid, zofran.   Ortho: Reduced the wrist. No surgical intervention of the hip.  (25 Nov 2024 01:49)    PAST MEDICAL & SURGICAL HISTORY:  Parkinson disease      Hypertension      No significant past surgical history        MEDICATIONS  (STANDING):  enoxaparin Injectable 40 milliGRAM(s) SubCutaneous every 24 hours  influenza  Vaccine (HIGH DOSE) 0.5 milliLiter(s) IntraMuscular once  losartan 100 milliGRAM(s) Oral daily  selegiline Oral Tab/Cap 5 milliGRAM(s) Oral <User Schedule>    MEDICATIONS  (PRN):  acetaminophen     Tablet .. 650 milliGRAM(s) Oral every 6 hours PRN Temp greater or equal to 38C (100.4F), Mild Pain (1 - 3)  HYDROmorphone  Injectable 0.2 milliGRAM(s) IV Push every 8 hours PRN Moderate Pain (4 - 6)  HYDROmorphone  Injectable 0.5 milliGRAM(s) IV Push every 8 hours PRN Severe Pain (7 - 10)    FAMILY HISTORY:  No pertinent family history in first degree relatives        CBC Full  -  ( 25 Nov 2024 05:30 )  WBC Count : 11.59 K/uL  RBC Count : 3.62 M/uL  Hemoglobin : 10.6 g/dL  Hematocrit : 32.7 %  Platelet Count - Automated : 254 K/uL  Mean Cell Volume : 90.3 fl  Mean Cell Hemoglobin : 29.3 pg  Mean Cell Hemoglobin Concentration : 32.4 g/dL  Auto Neutrophil # : 8.32 K/uL  Auto Lymphocyte # : 1.92 K/uL  Auto Monocyte # : 1.24 K/uL  Auto Eosinophil # : 0.02 K/uL  Auto Basophil # : 0.04 K/uL  Auto Neutrophil % : 71.8 %  Auto Lymphocyte % : 16.6 %  Auto Monocyte % : 10.7 %  Auto Eosinophil % : 0.2 %  Auto Basophil % : 0.3 %      11-25    133[L]  |  101  |  36[H]  ----------------------------<  124[H]  5.1   |  25  |  1.25    Ca    8.9      25 Nov 2024 05:30  Phos  3.8     11-25  Mg     2.2     11-25        Urinalysis Basic - ( 25 Nov 2024 05:30 )    Color: x / Appearance: x / SG: x / pH: x  Gluc: 124 mg/dL / Ketone: x  / Bili: x / Urobili: x   Blood: x / Protein: x / Nitrite: x   Leuk Esterase: x / RBC: x / WBC x   Sq Epi: x / Non Sq Epi: x / Bacteria: x        Radiology :             Review of Systems : per HPI         Vital Signs Last 24 Hrs  T(C): 36.4 (25 Nov 2024 05:31), Max: 36.8 (25 Nov 2024 02:30)  T(F): 97.5 (25 Nov 2024 05:31), Max: 98.2 (25 Nov 2024 02:30)  HR: 85 (25 Nov 2024 05:31) (77 - 92)  BP: 112/67 (25 Nov 2024 05:31) (112/67 - 162/85)  BP(mean): 98 (25 Nov 2024 02:30) (98 - 98)  RR: 17 (25 Nov 2024 05:31) (17 - 18)  SpO2: 95% (25 Nov 2024 05:31) (95% - 98%)    Parameters below as of 25 Nov 2024 05:31  Patient On (Oxygen Delivery Method): room air            Physical Exam:   79 y o woman w/ LUE splint  , lying comfortably in semi Dejesus's position , awake , alert , no acute complaints    Head: normocephalic , atraumatic    Eyes: PERRLA , EOMI , no nystagmus , sclera anicteric    ENT / FACE: neg nasal discharge , uvula midline , no oropharyngeal erythema / exudate    Neck: supple , negative JVD , negative carotid bruits , no thyromegaly    Chest: CTA bilaterally     Cardiovascular: regular rate and rhythm , neg murmurs / rubs / gallops    Abdomen: soft , non distended , no tenderness to palpation in all 4 quadrants ,  normal bowel sounds     Extremities: WWP , neg cyanosis /clubbing / edema      Neurologic Exam:     Alert and oriented to person , place , date/year , speech fluent w/o dysarthria      Cranial Nerves:           II:                         pupils equal , round and reactive to light , visual fields intact         III/ IV/VI:             extraocular movements intact , neg nystagmus , neg ptosis        V:                        facial sensation intact , V1-3 normal        VII:                      face symmetric , no droop , normal eye closure and smile        VIII:                     hearing intact to finger rub bilaterally        IX and X:             no hoarseness , gag intact , palate/ uvula rise symmetrically        XI:                       SCM / trapezius strength intact bilateral        XII:                      no tongue deviation    Motor Exam:        R UE/ LE > 4/5 , L UE/LE distally > 3+/5     Sensation:         intact to light touch x 4 extremities      Gait:  not tested         PM&R Impression: admitted for fall with left hip periprosthetic fracture/protected weight bearing , and left distal radius fx / NWB        Recommendations / Plan:       1) Physical / Occupational therapy focusing on therapeutic exercises , equipment evaluation , bed mobility/transfer out of bed evaluation , progressive ambulation with assistive devices prn .    2) Current disposition plan recommendation:     subacute rehab placement

## 2024-11-25 NOTE — PHYSICAL THERAPY INITIAL EVALUATION ADULT - ADDITIONAL COMMENTS
Patient lives alone in an elevator apartment, no ZANE. Prior to admission, patient was independent with all ADLs and IADLs. Patient ambulated in the community with straight cane or rollator and utilized a RW for home.

## 2024-11-25 NOTE — OCCUPATIONAL THERAPY INITIAL EVALUATION ADULT - DIAGNOSIS, OT EVAL
Pt admitted for mechanical fall, found to have Left periprosthetic hip fracture and Left distal radius fracture, non-operative treatment with pt now NWBing in LUE, 40% PWB for LLE. Pt presents with impaired balance, LUE/LLE pain, and decreased activity tolerance impacting overall ease of completing ADLs and mobility at [facility].

## 2024-11-25 NOTE — OCCUPATIONAL THERAPY INITIAL EVALUATION ADULT - PERTINENT HX OF CURRENT PROBLEM, REHAB EVAL
79F with PMH of Parkinsons, htn, prior left hemiarthroplasty in 2021, presents after a mechanical fall - tripped over her feet onto her wrist and hip.  She denies a head strike or loss of consciousness. This is the second time she's fallen in 3 years- last time was her prior hip fracture. She denies fevers, chills, shortness of breath, severe pain.

## 2024-11-25 NOTE — OCCUPATIONAL THERAPY INITIAL EVALUATION ADULT - GENERAL OBSERVATIONS, REHAB EVAL
Pt received semi-supine in bed, +heplock, +LUE splint, +L arm sling, in NAD and agreeable to OT. Cleared by MATEUS Saha to see.

## 2024-11-25 NOTE — DISCHARGE NOTE PROVIDER - CARE PROVIDER_API CALL
Hardeep Quintero  Surgery of the Hand  [address]  [place] [address]  Phone: [phone]  Fax: [phone]  Follow Up Time: 2 weeks   Hardeep Quintero  Surgery of the Hand  [address]  [place] [address]  Phone: [phone]  Fax: [phone]  Follow Up Time: 2 weeks    Doug Loja  Orthopaedic Surgery  [address] [address]  Phone: [phone]  Fax: [phone]  Follow Up Time:

## 2024-11-25 NOTE — DISCHARGE NOTE PROVIDER - PROVIDER TOKENS
PROVIDER:[TOKEN:[[id number]],FOLLOWUP:[2 weeks]] PROVIDER:[TOKEN:[[id number]],FOLLOWUP:[2 weeks]],PROVIDER:[TOKEN:[[id number]]]

## 2024-11-25 NOTE — H&P ADULT - HISTORY OF PRESENT ILLNESS
79F with PMH of Parkinsons, htn, prior left hemiarthroplasty in 2021, presents after a mechanical fall - tripped over her feet onto her wrist and hip.  She denies a head strike or loss of consciousness. This is the second time she's fallen in 3 years- last time was her prior hip fracture. She denies fevers, chills, shortness of breath, severe pain.     ED Course  T97 HR 92 /85 RR18 Sat 96% RA  Labs: WBC 11 Na 134   Imaging: Wrist XR: Distal Radius Fracture L. Hip XR: Fracture, femur.  Interventions: Tylenol, dilaudid, zofran.   Ortho: Reduced the wrist. No surgical intervention of the hip.

## 2024-11-25 NOTE — DISCHARGE NOTE PROVIDER - NSDCCPCAREPLAN_GEN_ALL_CORE_FT
PRINCIPAL DISCHARGE DIAGNOSIS  Diagnosis: Fracture of hip  Assessment and Plan of Treatment:      PRINCIPAL DISCHARGE DIAGNOSIS  Diagnosis: Fracture of hip  Assessment and Plan of Treatment: A health care provider can often diagnose a hip fracture based on symptoms and the abnormal position of the hip and leg. An X-ray usually will confirm the fracture and show where the fracture is.  If your X-ray doesn't show a fracture but you still have hip pain, your provider might order an MRI or bone scan to look for a hairline fracture.  Most hip fractures occur in one of two locations on the long bone that extends from the pelvis to your knee (femur):  The femoral neck. This area is situated in the upper portion of your femur, just below the ball part (femoral head) of the ball-and-socket joint.  The intertrochanteric region. This region is a little farther down from the hip joint, in the portion of the upper femur that juts outward.  Physical therapy will initially focus on range-of-motion and strengthening exercises. Depending on the type of surgery and whether there's help at home, going to an extended care facility might be necessary.  In extended care and at home, an occupational therapist teaches techniques for independence in daily life, such as using the toilet, bathing, dressing and cooking. An occupational therapist will determine if a walker or wheelchair might be needed to regain mobility and independence.

## 2024-11-25 NOTE — H&P ADULT - ATTENDING COMMENTS
79 YOF with PMH of PD, HTN, L hip hemiarthroplasty presenting after mechanical fall found to have L hip periprosthetic fracture and L distal radial fracture.    Vitals, labs, imaging reviewed. Patient denies prodromal symptoms prior to fall or recent history of exertional symptoms. No headstrike or LOC. Reports pain only with movement of LLE, minimal at rest. Exam notable for DMM.     Evaluated by ortho, underwent closed reduction of L distal radial fracture and placed in splint immobilizer (NWB, outpatient f/u). LLE periprosthetic fracture non-operative, partial weight bearing 40% with walker.     Plan  - obtain orthostatics  - PT consult  - optimize pain regimen, start OTC acetaminophen, patient hesitant to use opioids/stronger medications  - hydrate with 1L IVF x2    Dispo  - pending PT eval, anticipate PHYLLIS

## 2024-11-25 NOTE — H&P ADULT - ASSESSMENT
79F with PMH of Parkinsons, htn, prior left hemiarthroplasty in 2021, presents after a mechanical fall, admitted for left hip periprosthetic fracture.

## 2024-11-25 NOTE — H&P ADULT - PROBLEM SELECTOR PLAN 2
s/p closure, reduction, and immobilization in splint.  - NWB LUE  - f/u post reduction wrist XR.   - f.u Outpatient Dr. Quintero

## 2024-11-25 NOTE — OCCUPATIONAL THERAPY INITIAL EVALUATION ADULT - SITTING BALANCE: STATIC
----- Message from Jennifer Epps sent at 6/11/2020  9:48 AM CDT -----  Contact: Ashley (Negro CARMEN)  Name of Who is Calling: Ashley (Negro CARMEN)      What is the request in detail: Would like to speak to staff in regards to wanting to know if the office received the fax they sent over on the patient this morning for her clearance for surgery. Please advise.       Can the clinic reply by [facility]: No      What Number to Call Back if not in [facility]: [phone]; ext: [phone]                                     [facility]

## 2024-11-25 NOTE — PATIENT PROFILE ADULT - NSFALLSECTIONLABEL_GEN_A_CORE
Notified floor SBAR faxed. Patient placed in transport queue.          Jayden Merrill RN  11/28/21 [id number] .

## 2024-11-25 NOTE — H&P ADULT - PROBLEM SELECTOR PLAN 1
Seen by ortho in the ED, no surgical intervention at this time. Immobilized and flat.  - PT in the AM  - NWB to the LLE.   - ortho to f/u in the AM.   - Pain control- dilaudid 0.2 for moderate, 0.5 for severe.

## 2024-11-25 NOTE — H&P ADULT - NSHPSOCIALHISTORY_GEN_ALL_CORE
Denies smoking, drugs  Very occasioanl glass of wine. Denies smoking, drugs  Very occasional glass of wine.

## 2024-11-25 NOTE — H&P ADULT - PROBLEM SELECTOR PLAN 3
Home medications: Domperidone 10mgTID, Carbidopa TID, Selegiline 5mg BID, Rytary BID.  - cw home medications Home medications: Domperidone 10mgTID, Carbidopa 25 TID, Selegiline 5mg BID, Rytary BID.  - cw home medications

## 2024-11-25 NOTE — OCCUPATIONAL THERAPY INITIAL EVALUATION ADULT - ADDITIONAL COMMENTS
Pt lives alone in an apartment with elevator access, no ZANE. Prior to admit, pt reports being indpendent with ADLs and mobility. Pt states she uses a cane or rollator outdoors and a RW inside. Pt has grab bars in her bathroom and a walk-in shower. Pt is R hand dominant.

## 2024-11-25 NOTE — PHYSICAL THERAPY INITIAL EVALUATION ADULT - GAIT DEVIATIONS NOTED, PT EVAL
Patient benefits from verbal/visual cues for sequencing/decreased step length/decreased stride length/decreased weight-shifting ability

## 2024-11-25 NOTE — OCCUPATIONAL THERAPY INITIAL EVALUATION ADULT - RANGE OF MOTION EXAMINATION, UPPER EXTREMITY
limited L shoulder flexion ~0-60 degrees, increased time to flex digits/Right UE Active ROM was WNL (within normal limits)

## 2024-11-25 NOTE — H&P ADULT - NSHPPHYSICALEXAM_GEN_ALL_CORE
PHYSICAL EXAM:  Constitutional: NAD, comfortable in bed.  HEENT: NC/AT, PERRLA, EOMI, no conjunctival pallor or scleral icterus, MMM  Neck: Supple, no JVD  Respiratory: CTA B/L. No w/r/r.   Cardiovascular: RRR, normal S1 and S2, no m/r/g.   Gastrointestinal: +BS, soft NTND, no guarding or rebound tenderness, no palpable masses   Extremities: L. arm in mold and cast. L. leg straight and cast. No hematoma seen.   Vascular: Pulses equal and strong throughout.   Neurological: AAOx3, no CN deficits, strength and sensation intact throughout.   Skin: No gross skin abnormalities or rashes

## 2024-11-25 NOTE — DISCHARGE NOTE PROVIDER - CARE PROVIDERS DIRECT ADDRESSES
,[email or web address]

## 2024-11-25 NOTE — PHYSICAL THERAPY INITIAL EVALUATION ADULT - MANUAL MUSCLE TESTING RESULTS, REHAB EVAL
grossly assessed at least 3+/5 throughout grossly assessed at least 3+/5 throughout, except L hip flexion not assessed 2/2 pain, L knee ext 3/5

## 2024-11-25 NOTE — OCCUPATIONAL THERAPY INITIAL EVALUATION ADULT - RANGE OF MOTION EXAMINATION, LOWER EXTREMITY
limited LLE AROM 2/2 pain, able to demo full ankle DF/PF, limited hip and knee AROM/Right LE Active ROM was WNL(within normal limits) General

## 2024-11-25 NOTE — PHYSICAL THERAPY INITIAL EVALUATION ADULT - PERTINENT HX OF CURRENT PROBLEM, REHAB EVAL
Patient is a 79F with PMH of Parkinsons, htn, prior left hemiarthroplasty in 2021, presents after a mechanical fall - tripped over her feet onto her wrist and hip.  She denies a head strike or loss of consciousness. This is the second time she's fallen in 3 years- last time was her prior hip fracture. She denies fevers, chills, shortness of breath, severe pain.

## 2024-11-25 NOTE — DISCHARGE NOTE PROVIDER - NSDCMRMEDTOKEN_GEN_ALL_CORE_FT
CARBIDOPA  25 MG TABS:   Domperidone: 10mg 3x a day.  LOSARTAN POTASSIUM  100 MG TABS:   RYTARY  23.75-95 MG CPCR:   selegiline 5 mg oral tablet: 1 tab(s) orally 2 times a day  SELEGILINE HCL  5 MG TABS:    carbidopa 25 mg oral tablet: 1 tab(s) orally 3 times a day Take at 8:00 am, 1:00 pm, and 5:30 pm  carbidopa-levodopa 23.75 mg-95 mg oral capsule, extended release: 2 cap(s) orally 3 times a day Take at 8:00 am, 1:00 pm, and 5:30 pm  losartan 100 mg oral tablet: 1 tab(s) orally once a day  metoclopramide 10 mg oral tablet: 1 tab(s) orally every 6 hours  selegiline 5 mg oral capsule: 1 cap(s) orally 2 times a day Take at 8:00 am and 1:00 pm

## 2024-11-25 NOTE — CONSULT NOTE ADULT - SUBJECTIVE AND OBJECTIVE BOX
79y pmhx parkinsons disease and s/p L EARL with Dr. Loja Female community ambulator presents Left thigh/hip pain and left wrist pain s/p fall. Pt fell directly onto the leg and wrist and had immediate pain.  Denies pain/injury elsewhere.  ambulator with no assistive devices. Able to wiggle fingers. RHD.    HEALTH ISSUES - PROBLEM Dx:        PAST MEDICAL & SURGICAL HISTORY:  Parkinson disease      Hyperlipidemia      No significant past surgical history        MEDICATIONS  (STANDING):    Allergies    sulfa drugs (Nausea)  penicillin (Swelling)  codeine (Nausea)    Intolerances                            11.9   11.87 )-----------( 272      ( 24 Nov 2024 21:47 )             36.6     24 Nov 2024 21:47    134    |  101    |  34     ----------------------------<  113    5.2     |  24     |  1.09     Ca    9.1        24 Nov 2024 21:47      PT/INR - ( 24 Nov 2024 21:47 )   PT: 11.2 sec;   INR: 0.96          PTT - ( 24 Nov 2024 21:47 )  PTT:23.0 sec  Vital Signs Last 24 Hrs  T(C): 36.5 (11-24-24 @ 20:52), Max: 36.5 (11-24-24 @ 20:52)  T(F): 97.7 (11-24-24 @ 20:52), Max: 97.7 (11-24-24 @ 20:52)  HR: 92 (11-24-24 @ 20:52) (92 - 92)  BP: 162/85 (11-24-24 @ 20:52) (162/85 - 162/85)  BP(mean): --  RR: 18 (11-24-24 @ 20:52) (18 - 18)  SpO2: 96% (11-24-24 @ 20:52) (96% - 96%)    Imaging: XR demonstrates  Left periprosthetic hip fracture,  Left distal radius fracture    Physical Exam  Gen: NAD  LEFT LE: skin intact. +Swelling noted.Able to SLR.  +EHL/FHL/TA/GS function, DP/PT pulses palpable, compartments soft. Calf soft, nontender.     LEFT UE: No open skin. + deformity and swelling. Painless baseline AROM Shoulder, elbow,   NVID      A/P: 79y Female with Left periprosthetic hip fracture and left distal radius fracture  Left distal radius - closed reduced and immobilized in sugartong splint. PAT CURRIE.   Discussed splint precautions and ED return precautions numbness, tingling. Patient understood  Plan to follow up outpatient with Dr. Quintero for the LUE.       79y pmhx parkinsons disease and s/p L hip hemiarthroplasty with Dr. Loja Female community ambulator presents Left thigh/hip pain and left wrist pain s/p fall. Pt fell directly onto the leg and wrist and had immediate pain.  Denies pain/injury elsewhere.  ambulator with no assistive devices. Able to wiggle fingers. RHD.    HEALTH ISSUES - PROBLEM Dx:        PAST MEDICAL & SURGICAL HISTORY:  Parkinson disease      Hyperlipidemia      No significant past surgical history        MEDICATIONS  (STANDING):    Allergies    sulfa drugs (Nausea)  penicillin (Swelling)  codeine (Nausea)    Intolerances                            11.9   11.87 )-----------( 272      ( 24 Nov 2024 21:47 )             36.6     24 Nov 2024 21:47    134    |  101    |  34     ----------------------------<  113    5.2     |  24     |  1.09     Ca    9.1        24 Nov 2024 21:47      PT/INR - ( 24 Nov 2024 21:47 )   PT: 11.2 sec;   INR: 0.96          PTT - ( 24 Nov 2024 21:47 )  PTT:23.0 sec  Vital Signs Last 24 Hrs  T(C): 36.5 (11-24-24 @ 20:52), Max: 36.5 (11-24-24 @ 20:52)  T(F): 97.7 (11-24-24 @ 20:52), Max: 97.7 (11-24-24 @ 20:52)  HR: 92 (11-24-24 @ 20:52) (92 - 92)  BP: 162/85 (11-24-24 @ 20:52) (162/85 - 162/85)  BP(mean): --  RR: 18 (11-24-24 @ 20:52) (18 - 18)  SpO2: 96% (11-24-24 @ 20:52) (96% - 96%)    Imaging: XR demonstrates  Left periprosthetic hip fracture,  Left distal radius fracture    Physical Exam  Gen: NAD  LEFT LE: skin intact. +Swelling noted.Able to SLR.  +EHL/FHL/TA/GS function, DP/PT pulses palpable, compartments soft. Calf soft, nontender.     LEFT UE: No open skin. + deformity and swelling. Painless baseline AROM Shoulder, elbow,   NVID      A/P: 79y Female with Left periprosthetic hip fracture and left distal radius fracture  Left distal radius - closed reduced and immobilized in sugartong splint. PAT CURRIE.   Discussed splint precautions and ED return precautions numbness, tingling. Patient understood  Plan to follow up outpatient with Dr. Quintero for the LUE.    LLE periprosthetic hemiarthroplasty fracture with well fixed implant.  Plan nonoperative treatment  Protected weight bearing   PT consult   plan not final until discussed with attending        79y pmhx parkinsons disease and s/p L hip hemiarthroplasty with Dr. Loja Female community ambulator presents Left thigh/hip pain and left wrist pain s/p fall. Pt fell directly onto the leg and wrist and had immediate pain.  Denies pain/injury elsewhere.  ambulator with no assistive devices. Able to wiggle fingers. RHD.    HEALTH ISSUES - PROBLEM Dx:        PAST MEDICAL & SURGICAL HISTORY:  Parkinson disease      Hyperlipidemia      No significant past surgical history        MEDICATIONS  (STANDING):    Allergies    sulfa drugs (Nausea)  penicillin (Swelling)  codeine (Nausea)    Intolerances                            11.9   11.87 )-----------( 272      ( 24 Nov 2024 21:47 )             36.6     24 Nov 2024 21:47    134    |  101    |  34     ----------------------------<  113    5.2     |  24     |  1.09     Ca    9.1        24 Nov 2024 21:47      PT/INR - ( 24 Nov 2024 21:47 )   PT: 11.2 sec;   INR: 0.96          PTT - ( 24 Nov 2024 21:47 )  PTT:23.0 sec  Vital Signs Last 24 Hrs  T(C): 36.5 (11-24-24 @ 20:52), Max: 36.5 (11-24-24 @ 20:52)  T(F): 97.7 (11-24-24 @ 20:52), Max: 97.7 (11-24-24 @ 20:52)  HR: 92 (11-24-24 @ 20:52) (92 - 92)  BP: 162/85 (11-24-24 @ 20:52) (162/85 - 162/85)  BP(mean): --  RR: 18 (11-24-24 @ 20:52) (18 - 18)  SpO2: 96% (11-24-24 @ 20:52) (96% - 96%)    Imaging: XR demonstrates  Left periprosthetic hip fracture,  Left distal radius fracture    Physical Exam  Gen: NAD  LEFT LE: skin intact. +Swelling noted.Able to SLR.  +EHL/FHL/TA/GS function, DP/PT pulses palpable, compartments soft. Calf soft, nontender.     LEFT UE: No open skin. + deformity and swelling. Painless baseline AROM Shoulder, elbow,   NVID      A/P: 79y Female with Left periprosthetic hip fracture and left distal radius fracture  Left distal radius - closed reduced and immobilized in sugartong splint. PAT CURRIE.   Discussed splint precautions and ED return precautions numbness, tingling. Patient understood  Plan to follow up outpatient with Dr. Quintero for the LUE.    LLE periprosthetic hemiarthroplasty fracture with well fixed implant.  Plan nonoperative treatment  Protected weight bearing   PT consult   plan not final until signed by attending       79y pmhx parkinsons disease and s/p L hip hemiarthroplasty with Dr. Loja Female community ambulator presents Left thigh/hip pain and left wrist pain s/p fall. Pt fell directly onto the leg and wrist and had immediate pain.  Denies pain/injury elsewhere.  ambulator with no assistive devices. Able to wiggle fingers. RHD.    HEALTH ISSUES - PROBLEM Dx:        PAST MEDICAL & SURGICAL HISTORY:  Parkinson disease      Hyperlipidemia      No significant past surgical history        MEDICATIONS  (STANDING):    Allergies    sulfa drugs (Nausea)  penicillin (Swelling)  codeine (Nausea)    Intolerances                            11.9   11.87 )-----------( 272      ( 24 Nov 2024 21:47 )             36.6     24 Nov 2024 21:47    134    |  101    |  34     ----------------------------<  113    5.2     |  24     |  1.09     Ca    9.1        24 Nov 2024 21:47      PT/INR - ( 24 Nov 2024 21:47 )   PT: 11.2 sec;   INR: 0.96          PTT - ( 24 Nov 2024 21:47 )  PTT:23.0 sec  Vital Signs Last 24 Hrs  T(C): 36.5 (11-24-24 @ 20:52), Max: 36.5 (11-24-24 @ 20:52)  T(F): 97.7 (11-24-24 @ 20:52), Max: 97.7 (11-24-24 @ 20:52)  HR: 92 (11-24-24 @ 20:52) (92 - 92)  BP: 162/85 (11-24-24 @ 20:52) (162/85 - 162/85)  BP(mean): --  RR: 18 (11-24-24 @ 20:52) (18 - 18)  SpO2: 96% (11-24-24 @ 20:52) (96% - 96%)    Imaging: XR demonstrates  Left periprosthetic hip fracture,  Left distal radius fracture    Physical Exam  Gen: NAD  LEFT LE: skin intact. +Swelling noted.Able to SLR.  +EHL/FHL/TA/GS function, DP/PT pulses palpable, compartments soft. Calf soft, nontender.     LEFT UE: No open skin. + deformity and swelling. Painless baseline AROM Shoulder, elbow,   NVID      A/P: 79y Female with Left periprosthetic hip fracture and left distal radius fracture  Left distal radius - closed reduced and immobilized in sugartong splint. PAT CURRIE.   Discussed splint precautions and ED return precautions numbness, tingling. Patient understood  Plan to follow up outpatient with Dr. Quintero for the LUE.    LLE periprosthetic hemiarthroplasty fracture with well fixed implant.  Plan nonoperative treatment  Partial weight bearing 40% with walker   PT consult   plan not final until signed by attending

## 2024-11-26 ENCOUNTER — TRANSCRIPTION ENCOUNTER (OUTPATIENT)
Age: 79
End: 2024-11-26

## 2024-11-26 VITALS
RESPIRATION RATE: 96 BRPM | TEMPERATURE: 98 F | SYSTOLIC BLOOD PRESSURE: 111 MMHG | DIASTOLIC BLOOD PRESSURE: 69 MMHG | HEART RATE: 103 BPM

## 2024-11-26 LAB
ALBUMIN SERPL ELPH-MCNC: 3.7 G/DL — SIGNIFICANT CHANGE UP (ref 3.3–5)
ALP SERPL-CCNC: 42 U/L — SIGNIFICANT CHANGE UP (ref 40–120)
ALT FLD-CCNC: <5 U/L — LOW (ref 10–45)
ANION GAP SERPL CALC-SCNC: 9 MMOL/L — SIGNIFICANT CHANGE UP (ref 5–17)
AST SERPL-CCNC: 16 U/L — SIGNIFICANT CHANGE UP (ref 10–40)
BASOPHILS # BLD AUTO: 0.04 K/UL — SIGNIFICANT CHANGE UP (ref 0–0.2)
BASOPHILS NFR BLD AUTO: 0.4 % — SIGNIFICANT CHANGE UP (ref 0–2)
BILIRUB SERPL-MCNC: 0.8 MG/DL — SIGNIFICANT CHANGE UP (ref 0.2–1.2)
BUN SERPL-MCNC: 31 MG/DL — HIGH (ref 7–23)
CALCIUM SERPL-MCNC: 8.3 MG/DL — LOW (ref 8.4–10.5)
CHLORIDE SERPL-SCNC: 105 MMOL/L — SIGNIFICANT CHANGE UP (ref 96–108)
CO2 SERPL-SCNC: 23 MMOL/L — SIGNIFICANT CHANGE UP (ref 22–31)
CREAT SERPL-MCNC: 1.27 MG/DL — SIGNIFICANT CHANGE UP (ref 0.5–1.3)
EGFR: 43 ML/MIN/1.73M2 — LOW
EOSINOPHIL # BLD AUTO: 0.08 K/UL — SIGNIFICANT CHANGE UP (ref 0–0.5)
EOSINOPHIL NFR BLD AUTO: 0.9 % — SIGNIFICANT CHANGE UP (ref 0–6)
GLUCOSE SERPL-MCNC: 115 MG/DL — HIGH (ref 70–99)
HCT VFR BLD CALC: 28.3 % — LOW (ref 34.5–45)
HGB BLD-MCNC: 9.9 G/DL — LOW (ref 11.5–15.5)
IMM GRANULOCYTES NFR BLD AUTO: 0.5 % — SIGNIFICANT CHANGE UP (ref 0–0.9)
LYMPHOCYTES # BLD AUTO: 1.79 K/UL — SIGNIFICANT CHANGE UP (ref 1–3.3)
LYMPHOCYTES # BLD AUTO: 19.5 % — SIGNIFICANT CHANGE UP (ref 13–44)
MAGNESIUM SERPL-MCNC: 2 MG/DL — SIGNIFICANT CHANGE UP (ref 1.6–2.6)
MCHC RBC-ENTMCNC: 30.8 PG — SIGNIFICANT CHANGE UP (ref 27–34)
MCHC RBC-ENTMCNC: 35 G/DL — SIGNIFICANT CHANGE UP (ref 32–36)
MCV RBC AUTO: 88.2 FL — SIGNIFICANT CHANGE UP (ref 80–100)
MONOCYTES # BLD AUTO: 1.33 K/UL — HIGH (ref 0–0.9)
MONOCYTES NFR BLD AUTO: 14.5 % — HIGH (ref 2–14)
NEUTROPHILS # BLD AUTO: 5.89 K/UL — SIGNIFICANT CHANGE UP (ref 1.8–7.4)
NEUTROPHILS NFR BLD AUTO: 64.2 % — SIGNIFICANT CHANGE UP (ref 43–77)
NRBC # BLD: 0 /100 WBCS — SIGNIFICANT CHANGE UP (ref 0–0)
PHOSPHATE SERPL-MCNC: 3.2 MG/DL — SIGNIFICANT CHANGE UP (ref 2.5–4.5)
PLATELET # BLD AUTO: 202 K/UL — SIGNIFICANT CHANGE UP (ref 150–400)
POTASSIUM SERPL-MCNC: 4.2 MMOL/L — SIGNIFICANT CHANGE UP (ref 3.5–5.3)
POTASSIUM SERPL-SCNC: 4.2 MMOL/L — SIGNIFICANT CHANGE UP (ref 3.5–5.3)
PROT SERPL-MCNC: 6 G/DL — SIGNIFICANT CHANGE UP (ref 6–8.3)
RBC # BLD: 3.21 M/UL — LOW (ref 3.8–5.2)
RBC # FLD: 13.5 % — SIGNIFICANT CHANGE UP (ref 10.3–14.5)
SODIUM SERPL-SCNC: 137 MMOL/L — SIGNIFICANT CHANGE UP (ref 135–145)
WBC # BLD: 9.18 K/UL — SIGNIFICANT CHANGE UP (ref 3.8–10.5)
WBC # FLD AUTO: 9.18 K/UL — SIGNIFICANT CHANGE UP (ref 3.8–10.5)

## 2024-11-26 PROCEDURE — 84100 ASSAY OF PHOSPHORUS: CPT

## 2024-11-26 PROCEDURE — 86850 RBC ANTIBODY SCREEN: CPT

## 2024-11-26 PROCEDURE — 96374 THER/PROPH/DIAG INJ IV PUSH: CPT

## 2024-11-26 PROCEDURE — 97165 OT EVAL LOW COMPLEX 30 MIN: CPT

## 2024-11-26 PROCEDURE — 36415 COLL VENOUS BLD VENIPUNCTURE: CPT

## 2024-11-26 PROCEDURE — 85025 COMPLETE CBC W/AUTO DIFF WBC: CPT

## 2024-11-26 PROCEDURE — 86901 BLOOD TYPING SEROLOGIC RH(D): CPT

## 2024-11-26 PROCEDURE — 99232 SBSQ HOSP IP/OBS MODERATE 35: CPT | Mod: GC

## 2024-11-26 PROCEDURE — 83735 ASSAY OF MAGNESIUM: CPT

## 2024-11-26 PROCEDURE — 81001 URINALYSIS AUTO W/SCOPE: CPT

## 2024-11-26 PROCEDURE — 85610 PROTHROMBIN TIME: CPT

## 2024-11-26 PROCEDURE — 86900 BLOOD TYPING SEROLOGIC ABO: CPT

## 2024-11-26 PROCEDURE — 85730 THROMBOPLASTIN TIME PARTIAL: CPT

## 2024-11-26 PROCEDURE — 85027 COMPLETE CBC AUTOMATED: CPT

## 2024-11-26 PROCEDURE — 84540 ASSAY OF URINE/UREA-N: CPT

## 2024-11-26 PROCEDURE — 73552 X-RAY EXAM OF FEMUR 2/>: CPT

## 2024-11-26 PROCEDURE — 84300 ASSAY OF URINE SODIUM: CPT

## 2024-11-26 PROCEDURE — 80053 COMPREHEN METABOLIC PANEL: CPT

## 2024-11-26 PROCEDURE — 93005 ELECTROCARDIOGRAM TRACING: CPT

## 2024-11-26 PROCEDURE — 93010 ELECTROCARDIOGRAM REPORT: CPT

## 2024-11-26 PROCEDURE — 96375 TX/PRO/DX INJ NEW DRUG ADDON: CPT

## 2024-11-26 PROCEDURE — 83935 ASSAY OF URINE OSMOLALITY: CPT

## 2024-11-26 PROCEDURE — 80048 BASIC METABOLIC PNL TOTAL CA: CPT

## 2024-11-26 PROCEDURE — 25505 CLTX RDL SHFT FX W/MNPJ: CPT | Mod: LT

## 2024-11-26 PROCEDURE — 73502 X-RAY EXAM HIP UNI 2-3 VIEWS: CPT

## 2024-11-26 PROCEDURE — 73120 X-RAY EXAM OF HAND: CPT

## 2024-11-26 PROCEDURE — 99285 EMERGENCY DEPT VISIT HI MDM: CPT | Mod: 25

## 2024-11-26 PROCEDURE — 73110 X-RAY EXAM OF WRIST: CPT

## 2024-11-26 PROCEDURE — 97161 PT EVAL LOW COMPLEX 20 MIN: CPT

## 2024-11-26 RX ORDER — CARBIDOPA AND LEVODOPA 10; 100 MG/1; MG/1
0 TABLET ORAL
Qty: 0 | Refills: 0 | DISCHARGE

## 2024-11-26 RX ORDER — CARBIDOPA TABLETS, 25 MG 25 MG/1
1 TABLET ORAL
Qty: 0 | Refills: 0 | DISCHARGE
Start: 2024-11-26

## 2024-11-26 RX ORDER — LOSARTAN POTASSIUM 100 MG/1
0 TABLET, FILM COATED ORAL
Qty: 0 | Refills: 0 | DISCHARGE

## 2024-11-26 RX ORDER — CARBIDOPA AND LEVODOPA 10; 100 MG/1; MG/1
2 TABLET ORAL
Qty: 0 | Refills: 0 | DISCHARGE
Start: 2024-11-26

## 2024-11-26 RX ORDER — SELEGILINE HYDROCHLORIDE 5 MG/1
0 CAPSULE ORAL
Qty: 0 | Refills: 0 | DISCHARGE

## 2024-11-26 RX ORDER — LOSARTAN POTASSIUM 100 MG/1
1 TABLET, FILM COATED ORAL
Qty: 0 | Refills: 0 | DISCHARGE
Start: 2024-11-26

## 2024-11-26 RX ORDER — SELEGILINE HYDROCHLORIDE 5 MG/1
1 CAPSULE ORAL
Qty: 0 | Refills: 0 | DISCHARGE
Start: 2024-11-26

## 2024-11-26 RX ORDER — METOCLOPRAMIDE HYDROCHLORIDE 10 MG/1
1 TABLET ORAL
Qty: 0 | Refills: 0 | DISCHARGE

## 2024-11-26 RX ORDER — CARBIDOPA TABLETS, 25 MG 25 MG/1
0 TABLET ORAL
Qty: 0 | Refills: 0 | DISCHARGE

## 2024-11-26 RX ADMIN — CARBIDOPA TABLETS, 25 MG 25 MILLIGRAM(S): 25 TABLET ORAL at 08:44

## 2024-11-26 RX ADMIN — CARBIDOPA AND LEVODOPA 2 CAPSULE(S): 10; 100 TABLET ORAL at 13:06

## 2024-11-26 RX ADMIN — POLYETHYLENE GLYCOL 3350 17 GRAM(S): 17 POWDER, FOR SOLUTION ORAL at 07:17

## 2024-11-26 RX ADMIN — CARBIDOPA TABLETS, 25 MG 25 MILLIGRAM(S): 25 TABLET ORAL at 13:07

## 2024-11-26 RX ADMIN — LOSARTAN POTASSIUM 100 MILLIGRAM(S): 100 TABLET, FILM COATED ORAL at 06:18

## 2024-11-26 RX ADMIN — ACETAMINOPHEN 500MG 650 MILLIGRAM(S): 500 TABLET, COATED ORAL at 08:44

## 2024-11-26 RX ADMIN — ENOXAPARIN SODIUM 40 MILLIGRAM(S): 30 INJECTION SUBCUTANEOUS at 07:15

## 2024-11-26 RX ADMIN — SELEGILINE HYDROCHLORIDE 5 MILLIGRAM(S): 5 CAPSULE ORAL at 13:07

## 2024-11-26 RX ADMIN — CARBIDOPA AND LEVODOPA 2 CAPSULE(S): 10; 100 TABLET ORAL at 08:45

## 2024-11-26 RX ADMIN — ACETAMINOPHEN 500MG 650 MILLIGRAM(S): 500 TABLET, COATED ORAL at 09:44

## 2024-11-26 RX ADMIN — SELEGILINE HYDROCHLORIDE 5 MILLIGRAM(S): 5 CAPSULE ORAL at 08:44

## 2024-11-26 NOTE — DISCHARGE NOTE NURSING/CASE MANAGEMENT/SOCIAL WORK - PATIENT PORTAL LINK FT
You can access the FollowMyHealth Patient Portal offered by [facility] by registering at the following website: [email or web address]. By joining [employer]’s FollowMyHealth portal, you will also be able to view your health information using other applications (apps) compatible with our system.

## 2024-11-26 NOTE — PROGRESS NOTE ADULT - PROBLEM SELECTOR PLAN 4
WBC of 11 on admission likely 2/2 reactive from fall and fracture. No signs of systemic infection.  - Trend leukocytosis. WBC of 11 on admission likely 2/2 reactive from fall and fracture. No signs of systemic infection.  - Trend leukocytosis.  RESOLVED

## 2024-11-26 NOTE — PROGRESS NOTE ADULT - PROBLEM SELECTOR PLAN 2
s/p closure, reduction, and immobilization in splint.  - NWB LUE  - f/u post reduction wrist XR.   - f.u Outpatient Dr. Quintero s/p closure, reduction, and immobilization in splint.  - NWB LUE  - f/u post reduction wrist XR.   - f.u Outpatient Dr. Smart

## 2024-11-26 NOTE — PROGRESS NOTE ADULT - PROBLEM SELECTOR PLAN 1
Seen by ortho in the ED, no surgical intervention at this time. Immobilized and flat.  - PT in the AM  - NWB to the LLE.   - ortho to f/u in the AM.   - Pain control- dilaudid 0.2 for moderate, 0.5 for severe. Seen by ortho in the ED, no surgical intervention at this time. Immobilized and flat.  - PT in the AM  - NWB to the LLE.   - ortho to f/u in the AM.   - Pain control- OTC acetaminophen, oxycodone 5-10mg PRN Seen by ortho in the ED, no surgical intervention at this time. Immobilized and flat.  PT recommending [facility]  - NWB to the LLE.    - Pain control- OTC acetaminophen, oxycodone 5-10mg PRN

## 2024-11-26 NOTE — PROGRESS NOTE ADULT - PROBLEM SELECTOR PLAN 3
Home medications: Domperidone 10mgTID, Carbidopa 25 TID, Selegiline 5mg BID, Rytary BID.  - cw home medications

## 2024-11-26 NOTE — DISCHARGE NOTE NURSING/CASE MANAGEMENT/SOCIAL WORK - FINANCIAL ASSISTANCE
[facility] provides services at a reduced cost to those who are determined to be eligible through [facility]’s financial assistance program. Information regarding [facility]’s financial assistance program can be found by going to [email or web address] or by calling [phone].

## 2024-11-26 NOTE — PROGRESS NOTE ADULT - PROBLEM SELECTOR PLAN 6
F: None   E: Replete as necessary K>4 Mg>2  N: Full diet   DVT Prophylaxis: Lovenox 40mg daily   GI prophylaxis: None   CODE STATUS: FULL  DISPO: [facility]

## 2024-11-26 NOTE — PROGRESS NOTE ADULT - SUBJECTIVE AND OBJECTIVE BOX
OVERNIGHT EVENTS:    SUBJECTIVE / INTERVAL HPI: Patient seen and examined at bedside.     VITAL SIGNS:  Vital Signs Last 24 Hrs  T(C): 36.8 (2024 05:36), Max: 37 (2024 20:26)  T(F): 98.2 (2024 05:36), Max: 98.6 (2024 20:26)  HR: 84 (2024 05:36) (84 - 100)  BP: 123/75 (2024 05:36) (119/69 - 123/75)  BP(mean): --  RR: 18 (2024 05:36) (16 - 18)  SpO2: 93% (2024 05:36) (93% - 98%)    Parameters below as of 2024 05:36  Patient On (Oxygen Delivery Method): room air      I&O's Summary      PHYSICAL EXAM:    General: WDWN  HEENT: NC/AT; PERRL, anicteric sclera; MMM  Neck: supple  Cardiovascular: +S1/S2; RRR  Respiratory: CTA B/L; no W/R/R  Gastrointestinal: soft, NT/ND; +BSx4  Extremities: WWP; no edema, clubbing or cyanosis  Vascular: 2+ radial, DP/PT pulses B/L  Neurological: AAOx3; no focal deficits    MEDICATIONS:  MEDICATIONS  (STANDING):  acetaminophen     Tablet .. 650 milliGRAM(s) Oral every 6 hours  carbidopa 25 milliGRAM(s) Oral <User Schedule>  carbidopa 23.75 mG/levodopa 95 mG ER 2 Capsule(s) Oral <User Schedule>  DOMPERIDONE 10 milliGRAM(s) 10 milliGRAM(s) Oral <User Schedule>  enoxaparin Injectable 40 milliGRAM(s) SubCutaneous every 24 hours  influenza  Vaccine (HIGH DOSE) 0.5 milliLiter(s) IntraMuscular once  lactated ringers. 1000 milliLiter(s) (500 mL/Hr) IV Continuous <Continuous>  lactated ringers. 1000 milliLiter(s) (500 mL/Hr) IV Continuous <Continuous>  losartan 100 milliGRAM(s) Oral daily  polyethylene glycol 3350 17 Gram(s) Oral every 24 hours  selegiline Oral Tab/Cap 5 milliGRAM(s) Oral <User Schedule>  senna 1 Tablet(s) Oral every 24 hours    MEDICATIONS  (PRN):  oxyCODONE    IR 5 milliGRAM(s) Oral every 8 hours PRN Moderate Pain (4 - 6)  oxyCODONE    IR 10 milliGRAM(s) Oral every 8 hours PRN Severe Pain (7 - 10)      ALLERGIES:  Allergies    sulfa drugs (Nausea)  penicillin (Swelling)  codeine (Nausea)    Intolerances        LABS:                        10.6   11.59 )-----------( 254      ( 2024 05:30 )             32.7     11-25    133[L]  |  101  |  36[H]  ----------------------------<  124[H]  5.1   |  25  |  1.25    Ca    8.9      2024 05:30  Phos  3.8       Mg     2.2     25      PT/INR - ( 2024 21:47 )   PT: 11.2 sec;   INR: 0.96          PTT - ( 2024 21:47 )  PTT:23.0 sec  Urinalysis Basic - ( 2024 17:05 )    Color: Yellow / Appearance: Clear / S.019 / pH: x  Gluc: x / Ketone: Negative mg/dL  / Bili: Negative / Urobili: 0.2 mg/dL   Blood: x / Protein: Negative mg/dL / Nitrite: Negative   Leuk Esterase: Small / RBC: 1 /HPF / WBC 1 /HPF   Sq Epi: x / Non Sq Epi: 2 /HPF / Bacteria: Negative /HPF      CAPILLARY BLOOD GLUCOSE          RADIOLOGY & ADDITIONAL TESTS: Reviewed.   OVERNIGHT EVENTS: Lin    SUBJECTIVE / INTERVAL HPI: Patient seen and examined at bedside. She feels well today, says her pain is well controlled and she only has pain in the arm with movement. Denies chest pain, SOB, abdominal pain, nausea, vomiting, dizziness, lightheadedness.     VITAL SIGNS:  Vital Signs Last 24 Hrs  T(C): 36.8 (2024 05:36), Max: 37 (2024 20:26)  T(F): 98.2 (2024 05:36), Max: 98.6 (2024 20:26)  HR: 84 (2024 05:36) (84 - 100)  BP: 123/75 (2024 05:36) (119/69 - 123/75)  BP(mean): --  RR: 18 (2024 05:36) (16 - 18)  SpO2: 93% (2024 05:36) (93% - 98%)    Parameters below as of 2024 05:36  Patient On (Oxygen Delivery Method): room air      I&O's Summary      PHYSICAL EXAM:  Constitutional: NAD, comfortable in bed.  HEENT: NC/AT, PERRLA, EOMI, no conjunctival pallor or scleral icterus, MMM  Neck: Supple, no JVD  Respiratory: CTA B/L. No w/r/r.   Cardiovascular: RRR, normal S1 and S2, no m/r/g.   Gastrointestinal: +BS, soft NTND, no guarding or rebound tenderness, no palpable masses   Extremities: L. arm in mold and cast. No hematoma seen. Able to move fingers and toes.  Vascular: Pulses equal and strong throughout.   Neurological: AAOx3, no CN deficits, strength and sensation intact throughout.   Skin: No gross skin abnormalities or rashes    MEDICATIONS:  MEDICATIONS  (STANDING):  acetaminophen     Tablet .. 650 milliGRAM(s) Oral every 6 hours  carbidopa 25 milliGRAM(s) Oral <User Schedule>  carbidopa 23.75 mG/levodopa 95 mG ER 2 Capsule(s) Oral <User Schedule>  DOMPERIDONE 10 milliGRAM(s) 10 milliGRAM(s) Oral <User Schedule>  enoxaparin Injectable 40 milliGRAM(s) SubCutaneous every 24 hours  influenza  Vaccine (HIGH DOSE) 0.5 milliLiter(s) IntraMuscular once  lactated ringers. 1000 milliLiter(s) (500 mL/Hr) IV Continuous <Continuous>  lactated ringers. 1000 milliLiter(s) (500 mL/Hr) IV Continuous <Continuous>  losartan 100 milliGRAM(s) Oral daily  polyethylene glycol 3350 17 Gram(s) Oral every 24 hours  selegiline Oral Tab/Cap 5 milliGRAM(s) Oral <User Schedule>  senna 1 Tablet(s) Oral every 24 hours    MEDICATIONS  (PRN):  oxyCODONE    IR 5 milliGRAM(s) Oral every 8 hours PRN Moderate Pain (4 - 6)  oxyCODONE    IR 10 milliGRAM(s) Oral every 8 hours PRN Severe Pain (7 - 10)      ALLERGIES:  Allergies    sulfa drugs (Nausea)  penicillin (Swelling)  codeine (Nausea)    Intolerances        LABS:                        10.6   11.59 )-----------( 254      ( 2024 05:30 )             32.7     11-25    133[L]  |  101  |  36[H]  ----------------------------<  124[H]  5.1   |  25  |  1.25    Ca    8.9      2024 05:30  Phos  3.8     11-25  Mg     2.2     11-25      PT/INR - ( 2024 21:47 )   PT: 11.2 sec;   INR: 0.96          PTT - ( 2024 21:47 )  PTT:23.0 sec  Urinalysis Basic - ( 2024 17:05 )    Color: Yellow / Appearance: Clear / S.019 / pH: x  Gluc: x / Ketone: Negative mg/dL  / Bili: Negative / Urobili: 0.2 mg/dL   Blood: x / Protein: Negative mg/dL / Nitrite: Negative   Leuk Esterase: Small / RBC: 1 /HPF / WBC 1 /HPF   Sq Epi: x / Non Sq Epi: 2 /HPF / Bacteria: Negative /HPF      CAPILLARY BLOOD GLUCOSE          RADIOLOGY & ADDITIONAL TESTS: Reviewed.

## 2024-11-26 NOTE — PROGRESS NOTE ADULT - ATTENDING COMMENTS
79 YOF with PMH of PD, HTN, L hip hemiarthroplasty presenting after mechanical fall found to have L hip periprosthetic fracture and L distal radial fracture.    Vitals, labs, imaging reviewed. Patient feels well today - states felt a lot of benefit from PT yesterday and while she is apprehensive about rehab she understands importance.     Evaluated by ortho, underwent closed reduction of L distal radial fracture and placed in splint immobilizer (NWB, outpatient f/u). LLE periprosthetic fracture non-operative, partial weight bearing 40% with walker.     Plan  - cont pain control, OTC acetaminophen, patient hesitant to use opioids/stronger medications    Dispo: [facility] pending 3 MN stay. Plan d/w patient and daughter at bedside.

## 2024-12-11 ENCOUNTER — INPATIENT (INPATIENT)
Facility: HOSPITAL | Age: 79
LOS: 14 days | Discharge: NURSING, REHAB OR LONG-TERM CARE | DRG: 480 | End: 2024-12-26
Attending: ORTHOPAEDIC SURGERY | Admitting: ORTHOPAEDIC SURGERY
Payer: MEDICARE

## 2024-12-11 VITALS
HEIGHT: 66 IN | SYSTOLIC BLOOD PRESSURE: 135 MMHG | TEMPERATURE: 98 F | DIASTOLIC BLOOD PRESSURE: 79 MMHG | HEART RATE: 98 BPM | WEIGHT: 130.07 LBS | OXYGEN SATURATION: 98 % | RESPIRATION RATE: 16 BRPM

## 2024-12-11 DIAGNOSIS — D72.829 ELEVATED WHITE BLOOD CELL COUNT, UNSPECIFIED: ICD-10-CM

## 2024-12-11 DIAGNOSIS — G20.A1 PARKINSON'S DISEASE WITHOUT DYSKINESIA, WITHOUT MENTION OF FLUCTUATIONS: ICD-10-CM

## 2024-12-11 DIAGNOSIS — I10 ESSENTIAL (PRIMARY) HYPERTENSION: ICD-10-CM

## 2024-12-11 DIAGNOSIS — Z88.2 ALLERGY STATUS TO SULFONAMIDES: ICD-10-CM

## 2024-12-11 DIAGNOSIS — M97.02XA PERIPROSTHETIC FRACTURE AROUND INTERNAL PROSTHETIC LEFT HIP JOINT, INITIAL ENCOUNTER: ICD-10-CM
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
## 2024-12-11 DIAGNOSIS — D75.839 THROMBOCYTOSIS, UNSPECIFIED: ICD-10-CM
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
## 2024-12-11 DIAGNOSIS — Z88.0 ALLERGY STATUS TO PENICILLIN: ICD-10-CM

## 2024-12-11 DIAGNOSIS — Z01.818 ENCOUNTER FOR OTHER PREPROCEDURAL EXAMINATION: ICD-10-CM

## 2024-12-11 DIAGNOSIS — S52.502A UNSPECIFIED FRACTURE OF THE LOWER END OF LEFT RADIUS, INITIAL ENCOUNTER FOR CLOSED FRACTURE: ICD-10-CM

## 2024-12-11 DIAGNOSIS — Z41.8 ENCOUNTER FOR OTHER PROCEDURES FOR PURPOSES OTHER THAN REMEDYING HEALTH STATE: ICD-10-CM
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
## 2024-12-11 DIAGNOSIS — D64.9 ANEMIA, UNSPECIFIED: ICD-10-CM

## 2024-12-11 LAB
ANION GAP SERPL CALC-SCNC: 8 MMOL/L — SIGNIFICANT CHANGE UP (ref 5–17)
APPEARANCE UR: CLEAR — SIGNIFICANT CHANGE UP
APTT BLD: 27.7 SEC — SIGNIFICANT CHANGE UP (ref 24.5–35.6)
BASOPHILS # BLD AUTO: 0 K/UL — SIGNIFICANT CHANGE UP (ref 0–0.2)
BASOPHILS NFR BLD AUTO: 0 % — SIGNIFICANT CHANGE UP (ref 0–2)
BILIRUB UR-MCNC: NEGATIVE — SIGNIFICANT CHANGE UP
BLD GP AB SCN SERPL QL: NEGATIVE — SIGNIFICANT CHANGE UP
BUN SERPL-MCNC: 25 MG/DL — HIGH (ref 7–23)
CALCIUM SERPL-MCNC: 8.2 MG/DL — LOW (ref 8.4–10.5)
CHLORIDE SERPL-SCNC: 102 MMOL/L — SIGNIFICANT CHANGE UP (ref 96–108)
CO2 SERPL-SCNC: 25 MMOL/L — SIGNIFICANT CHANGE UP (ref 22–31)
COLOR SPEC: YELLOW — SIGNIFICANT CHANGE UP
CREAT SERPL-MCNC: 1.14 MG/DL — SIGNIFICANT CHANGE UP (ref 0.5–1.3)
DIFF PNL FLD: NEGATIVE — SIGNIFICANT CHANGE UP
EGFR: 49 ML/MIN/1.73M2 — LOW
EOSINOPHIL # BLD AUTO: 0.39 K/UL — SIGNIFICANT CHANGE UP (ref 0–0.5)
EOSINOPHIL NFR BLD AUTO: 2.7 % — SIGNIFICANT CHANGE UP (ref 0–6)
GLUCOSE SERPL-MCNC: 117 MG/DL — HIGH (ref 70–99)
GLUCOSE UR QL: NEGATIVE MG/DL — SIGNIFICANT CHANGE UP
HCT VFR BLD CALC: 28 % — LOW (ref 34.5–45)
HGB BLD-MCNC: 8.8 G/DL — LOW (ref 11.5–15.5)
INR BLD: 1.07 — SIGNIFICANT CHANGE UP (ref 0.85–1.16)
KETONES UR-MCNC: ABNORMAL MG/DL
LEUKOCYTE ESTERASE UR-ACNC: ABNORMAL
LYMPHOCYTES # BLD AUTO: 15.9 % — SIGNIFICANT CHANGE UP (ref 13–44)
LYMPHOCYTES # BLD AUTO: 2.27 K/UL — SIGNIFICANT CHANGE UP (ref 1–3.3)
MCHC RBC-ENTMCNC: 27.9 PG — SIGNIFICANT CHANGE UP (ref 27–34)
MCHC RBC-ENTMCNC: 31.4 G/DL — LOW (ref 32–36)
MCV RBC AUTO: 88.9 FL — SIGNIFICANT CHANGE UP (ref 80–100)
MONOCYTES # BLD AUTO: 0.26 K/UL — SIGNIFICANT CHANGE UP (ref 0–0.9)
MONOCYTES NFR BLD AUTO: 1.8 % — LOW (ref 2–14)
NEUTROPHILS # BLD AUTO: 10.72 K/UL — HIGH (ref 1.8–7.4)
NEUTROPHILS NFR BLD AUTO: 74.3 % — SIGNIFICANT CHANGE UP (ref 43–77)
NITRITE UR-MCNC: NEGATIVE — SIGNIFICANT CHANGE UP
PH UR: 6.5 — SIGNIFICANT CHANGE UP (ref 5–8)
PLATELET # BLD AUTO: 674 K/UL — HIGH (ref 150–400)
POTASSIUM SERPL-MCNC: 5 MMOL/L — SIGNIFICANT CHANGE UP (ref 3.5–5.3)
POTASSIUM SERPL-SCNC: 5 MMOL/L — SIGNIFICANT CHANGE UP (ref 3.5–5.3)
PROT UR-MCNC: SIGNIFICANT CHANGE UP MG/DL
PROTHROM AB SERPL-ACNC: 12.3 SEC — SIGNIFICANT CHANGE UP (ref 9.9–13.4)
RBC # BLD: 3.15 M/UL — LOW (ref 3.8–5.2)
RBC # FLD: 14.1 % — SIGNIFICANT CHANGE UP (ref 10.3–14.5)
RH IG SCN BLD-IMP: POSITIVE — SIGNIFICANT CHANGE UP
SODIUM SERPL-SCNC: 135 MMOL/L — SIGNIFICANT CHANGE UP (ref 135–145)
SP GR SPEC: 1.02 — SIGNIFICANT CHANGE UP (ref 1–1.03)
UROBILINOGEN FLD QL: 1 MG/DL — SIGNIFICANT CHANGE UP (ref 0.2–1)
WBC # BLD: 14.26 K/UL — HIGH (ref 3.8–10.5)
WBC # FLD AUTO: 14.26 K/UL — HIGH (ref 3.8–10.5)

## 2024-12-11 PROCEDURE — 99222 1ST HOSP IP/OBS MODERATE 55: CPT

## 2024-12-11 PROCEDURE — 99285 EMERGENCY DEPT VISIT HI MDM: CPT

## 2024-12-11 PROCEDURE — 73110 X-RAY EXAM OF WRIST: CPT | Mod: 26,LT

## 2024-12-11 PROCEDURE — 73552 X-RAY EXAM OF FEMUR 2/>: CPT | Mod: 26,LT

## 2024-12-11 PROCEDURE — 71045 X-RAY EXAM CHEST 1 VIEW: CPT | Mod: 26

## 2024-12-11 PROCEDURE — 73502 X-RAY EXAM HIP UNI 2-3 VIEWS: CPT | Mod: 26,LT

## 2024-12-11 PROCEDURE — 93010 ELECTROCARDIOGRAM REPORT: CPT

## 2024-12-11 RX ORDER — OXYCODONE HCL 15 MG
5 TABLET ORAL EVERY 4 HOURS
Refills: 0 | Status: DISCONTINUED | OUTPATIENT
Start: 2024-12-11 | End: 2024-12-11

## 2024-12-11 RX ORDER — OXYCODONE HCL 15 MG
10 TABLET ORAL EVERY 4 HOURS
Refills: 0 | Status: DISCONTINUED | OUTPATIENT
Start: 2024-12-11 | End: 2024-12-11

## 2024-12-11 RX ORDER — CHLORHEXIDINE GLUCONATE 1.2 MG/ML
1 RINSE ORAL EVERY 12 HOURS
Refills: 0 | Status: COMPLETED | OUTPATIENT
Start: 2024-12-11 | End: 2024-12-12

## 2024-12-11 RX ORDER — POVIDONE IODINE USP, 10% W/W 10 MG/ML
1 SWAB TOPICAL ONCE
Refills: 0 | Status: COMPLETED | OUTPATIENT
Start: 2024-12-11 | End: 2024-12-12

## 2024-12-11 RX ORDER — CARBIDOPA 25 MG/1
25 TABLET ORAL
Refills: 0 | Status: DISCONTINUED | OUTPATIENT
Start: 2024-12-12 | End: 2024-12-26

## 2024-12-11 RX ORDER — HYDROMORPHONE HCL 4 MG
0.5 TABLET ORAL EVERY 6 HOURS
Refills: 0 | Status: DISCONTINUED | OUTPATIENT
Start: 2024-12-11 | End: 2024-12-11

## 2024-12-11 RX ORDER — LEVODOPA AND CARBIDOPA 145; 36.25 MG/1; MG/1
1 CAPSULE, EXTENDED RELEASE ORAL
Refills: 0 | Status: DISCONTINUED | OUTPATIENT
Start: 2024-12-12 | End: 2024-12-12

## 2024-12-11 RX ORDER — ENOXAPARIN SODIUM 60 MG/.6ML
40 INJECTION INTRAVENOUS; SUBCUTANEOUS ONCE
Refills: 0 | Status: COMPLETED | OUTPATIENT
Start: 2024-12-11 | End: 2024-12-11

## 2024-12-11 RX ORDER — SODIUM CHLORIDE 9 MG/ML
1000 INJECTION, SOLUTION INTRAVENOUS
Refills: 0 | Status: DISCONTINUED | OUTPATIENT
Start: 2024-12-12 | End: 2024-12-12

## 2024-12-11 RX ADMIN — ENOXAPARIN SODIUM 40 MILLIGRAM(S): 60 INJECTION INTRAVENOUS; SUBCUTANEOUS at 21:15

## 2024-12-11 NOTE — CONSULT NOTE ADULT - SUBJECTIVE AND OBJECTIVE BOX
Patient is a 79y old  Female who presents with a chief complaint of a mechanical fall.    HPI:  79-year-old with prior history of Parkinson's, hypertension and mechanical fall on  resulting in L DRF and L periprosthetic hip fracture    Pt was initally seen and admitted   and sent to [facility],with  follow-up to see Dr. Loja. Pt with repeat imaging with worsening of fracture. Plan for admission for L distal radius ORIF and L hip surgery at a later date.    Per Dr. Loja wrist would not heal properly without surgery, and advised for admission to [facility] for surgical intervention. Patient at baseline ambulates without assistive device. Ambulates with walker since injury    Pt presents today with symptoms of L wrist pain and L hip pain. Additionally C/o?persistent?hip/groin pain without any new weakness/n/t/p. No other MSK complaints.   Denies HS/LOC.      REVIEW OF SYSTEMS:  General: no fever, chills  Eyes: no vision changes  ENT: no hearing changes, nasal congestion, or sore throat  CV: no chest pain or palpitations  Pulm: no SOB, wheezing, cough, or hemoptysis  Abd/GI: no nausea, vomiting, diarrhea, constipation, abd pain  : no dysuria, hematuria, urinary frequency  MSK: no joint pain or myalgias  Neuro: no syncope, dizziness, focal weakness  Psych: no anxiety or depression  Endo: no heat or cold intolerance    PAST MEDICAL & SURGICAL HISTORY:  Parkinson disease  Hypertension    FAMILY HISTORY:    SOCIAL HISTORY:  Living situation:  Functional status:  Recent travel:   Occupation:  Tobacco use:   EtOH use:  Illicit drug use:  Sexual History:    MEDICATIONS:  MEDICATIONS  (STANDING):  chlorhexidine 2% Cloths 1 Application(s) Topical every 12 hours  enoxaparin Injectable 40 milliGRAM(s) SubCutaneous once  povidone iodine 10% Nasal Swab 1 Application(s) Both Nostrils once    MEDICATIONS  (PRN):  HYDROmorphone  Injectable 0.5 milliGRAM(s) IV Push every 6 hours PRN breakthrough pain  oxyCODONE    IR 10 milliGRAM(s) Oral every 4 hours PRN Severe Pain (7 - 10)  oxyCODONE    IR 5 milliGRAM(s) Oral every 4 hours PRN Moderate Pain (4 - 6)    ALLERGIES:  penicillin (Swelling)  sulfa drugs (Nausea)  codeine (Nausea)    VITAL SIGNS:  Vital Signs Last 24 Hrs  T(C): 37 (11 Dec 2024 19:00), Max: 37 (11 Dec 2024 19:00)  T(F): 98.6 (11 Dec 2024 19:00), Max: 98.6 (11 Dec 2024 19:00)  HR: 88 (11 Dec 2024 19:00) (88 - 98)  BP: 148/80 (11 Dec 2024 19:00) (135/79 - 148/80)  BP(mean): --  RR: 18 (11 Dec 2024 19:00) (16 - 18)  SpO2: 99% (11 Dec 2024 19:00) (98% - 99%)    Parameters below as of 11 Dec 2024 19:00  Patient On (Oxygen Delivery Method): room air    PHYSICAL EXAM:  Constitutional: resting comfortably in bed; NAD  Head: NC/AT  Eyes: PERRL, EOMI, anicteric sclera  ENT: no nasal discharge; uvula midline, no oropharyngeal erythema or exudates; MMM  Neck: supple; no JVD or thyromegaly  Respiratory: CTA B/L; no W/R/R, no retractions  Cardiac: +S1/S2; RRR; no M/R/G; PMI non-displaced  Gastrointestinal: abdomen soft, NT/ND; no rebound or guarding; +BSx4  Back: spine midline, no bony tenderness or step-offs; no CVAT B/L  Extremities: WWP, no clubbing or cyanosis; no peripheral edema  Musculoskeletal: NROM x4; no joint swelling, tenderness or erythema  Vascular: 2+ radial, femoral, DP/PT pulses B/L  Dermatologic: skin warm, dry and intact; no rashes, wounds, or scars  Lymphatic: no submandibular or cervical LAD  Neurologic: AAOx3; CNII-XII grossly intact; no focal deficits  Psychiatric: affect and characteristics of appearance, verbalizations, behaviors are appropriate    LABS:                        8.8    14.26 )-----------( 674      ( 11 Dec 2024 16:47 )             28.0     12-11    135  |  102  |  25[H]  ----------------------------<  117[H]  5.0   |  25  |  1.14    Ca    8.2[L]      11 Dec 2024 16:47      PT/INR - ( 11 Dec 2024 16:47 )   PT: 12.3 sec;   INR: 1.07          PTT - ( 11 Dec 2024 16:47 )  PTT:27.7 sec  Urinalysis Basic - ( 11 Dec 2024 17:46 )    Color: Yellow / Appearance: Clear / S.017 / pH: x  Gluc: x / Ketone: Trace mg/dL  / Bili: Negative / Urobili: 1.0 mg/dL   Blood: x / Protein: Trace mg/dL / Nitrite: Negative   Leuk Esterase: Small / RBC: 2 /HPF / WBC 2 /HPF   Sq Epi: x / Non Sq Epi: 2 /HPF / Bacteria: Negative /HPF    RADIOLOGY & ADDITIONAL TESTS:  L wrist - Reduced displacement, angulation and impaction at the site of a distal radial comminuted fracture. Redemonstration ulnar styloid fracture.. Stable osteoarthrosis.    L hip - The patient is status post left hip hemiarthroplasty. There is a periprosthetic fracture of the proximal femur in the trochanteric and subtrochanteric region. The right hip is normal in appearance. HPI:  79-year-old with prior history of Parkinson's, hypertension and mechanical fall on  resulting in L DRF and L periprosthetic hip fracture    Pt was initially seen and admitted   and sent to [facility], with  follow-up to see Dr. Loja. Pt with repeat imaging with worsening of fracture. Plan at that time was for admission for L distal radius ORIF and L hip surgery at a later date.  The patient did well at rehab, but per Dr. Loja wrist would not heal properly without surgery, and advised for admission to [facility] for surgical intervention. Patient at baseline ambulates without assistive device. Ambulates with walker since injury  Pt presents today with symptoms of L wrist pain and L hip pain. Additionally C/o?persistent?hip/groin pain without any new weakness/n/t/p. No other MSK complaints. The wrist pain is decsribed as "frozen" in feeling. Overall however she is in her usual state of health. She is hopeful that the surgery will resolve her symptoms.      REVIEW OF SYSTEMS:  General: no fever, chills  Eyes: no vision changes  ENT: no hearing changes, nasal congestion, or sore throat  CV: no chest pain or palpitations  Pulm: no SOB, wheezing, cough, or hemoptysis  Abd/GI: no nausea, vomiting, diarrhea, constipation, abd pain  : no dysuria, hematuria, urinary frequency  MSK: no joint pain or myalgias  Neuro: no syncope, dizziness, focal weakness  Psych: no anxiety or depression  Endo: no heat or cold intolerance    PAST MEDICAL & SURGICAL HISTORY:  Parkinson disease  Hypertension    FAMILY HISTORY:  No pertinent    SOCIAL HISTORY:  Living situation: lives in an apartment in the [facility], elevator building  Functional status: full adls iadls  Recent travel: [facility] three weeks ago  Occupation: retired  Tobacco use: none  EtOH use: very rare half glass of wine  Illicit drug use: none    MEDICATIONS:  MEDICATIONS  (STANDING):  chlorhexidine 2% Cloths 1 Application(s) Topical every 12 hours  enoxaparin Injectable 40 milliGRAM(s) SubCutaneous once  povidone iodine 10% Nasal Swab 1 Application(s) Both Nostrils once    MEDICATIONS  (PRN):  HYDROmorphone  Injectable 0.5 milliGRAM(s) IV Push every 6 hours PRN breakthrough pain  oxyCODONE    IR 10 milliGRAM(s) Oral every 4 hours PRN Severe Pain (7 - 10)  oxyCODONE    IR 5 milliGRAM(s) Oral every 4 hours PRN Moderate Pain (4 - 6)    ALLERGIES:  penicillin (Swelling)  sulfa drugs (Nausea)  codeine (Nausea)    VITAL SIGNS:  Vital Signs Last 24 Hrs  T(C): 37 (11 Dec 2024 19:00), Max: 37 (11 Dec 2024 19:00)  T(F): 98.6 (11 Dec 2024 19:00), Max: 98.6 (11 Dec 2024 19:00)  HR: 88 (11 Dec 2024 19:00) (88 - 98)  BP: 148/80 (11 Dec 2024 19:00) (135/79 - 148/80)  BP(mean): --  RR: 18 (11 Dec 2024 19:00) (16 - 18)  SpO2: 99% (11 Dec 2024 19:00) (98% - 99%)    Parameters below as of 11 Dec 2024 19:00  Patient On (Oxygen Delivery Method): room air    PHYSICAL EXAM:  Constitutional: resting comfortably in bed; NAD  Head: NC/AT  Eyes: PERRL, EOMI, anicteric sclera  ENT: no nasal discharge; uvula midline, no oropharyngeal erythema or exudates; MMM  Neck: supple; no JVD or thyromegaly  Respiratory: CTA B/L; no W/R/R, no retractions  Cardiac: +S1/S2; RRR; no M/R/G  Gastrointestinal: abdomen soft, NT/ND; no rebound or guarding; +BSx4  Back: spine midline, no bony tenderness or step-offs; no CVAT B/L  Extremities: WWP, no clubbing or cyanosis; no peripheral edema  Musculoskeletal: NROM x4; left wrist casted from hand to left elbow  Vascular: 2+ radial, femoral, DP/PT pulses B/L  Dermatologic: skin warm, dry and intact; no rashes, wounds, or scars  Lymphatic: no submandibular or cervical LAD  Neurologic: AAOx3; CNII-XII grossly intact; no focal deficits  Psychiatric: affect and characteristics of appearance, verbalizations, behaviors are appropriate    LABS:                        8.8    14.26 )-----------( 674      ( 11 Dec 2024 16:47 )             28.0     12-11    135  |  102  |  25[H]  ----------------------------<  117[H]  5.0   |  25  |  1.14    Ca    8.2[L]      11 Dec 2024 16:47      PT/INR - ( 11 Dec 2024 16:47 )   PT: 12.3 sec;   INR: 1.07          PTT - ( 11 Dec 2024 16:47 )  PTT:27.7 sec  Urinalysis Basic - ( 11 Dec 2024 17:46 )    Color: Yellow / Appearance: Clear / S.017 / pH: x  Gluc: x / Ketone: Trace mg/dL  / Bili: Negative / Urobili: 1.0 mg/dL   Blood: x / Protein: Trace mg/dL / Nitrite: Negative   Leuk Esterase: Small / RBC: 2 /HPF / WBC 2 /HPF   Sq Epi: x / Non Sq Epi: 2 /HPF / Bacteria: Negative /HPF    RADIOLOGY & ADDITIONAL TESTS:  2024: L wrist - Reduced displacement, angulation and impaction at the site of a distal radial comminuted fracture. Redemonstration ulnar styloid fracture.. Stable osteoarthrosis.    2024: L hip - The patient is status post left hip hemiarthroplasty. There is a periprosthetic fracture of the proximal femur in the trochanteric and subtrochanteric region. The right hip is normal in appearance. HPI:  79-year-old with prior history of Parkinson's, hypertension and mechanical fall on  resulting in L DRF and L periprosthetic hip fracture    Pt was initially seen and admitted   and sent to [facility], with  follow-up to see Dr. Loja. Pt with repeat imaging with worsening of fracture. Plan at that time was for admission for L distal radius ORIF and L hip surgery at a later date.  The patient did well at rehab, but per Dr. Loja wrist would not heal properly without surgery, and advised for admission to [facility] for surgical intervention. Patient at baseline ambulates without assistive device. Ambulates with walker since injury  Pt presents today with symptoms of L wrist pain and L hip pain. Additionally C/o?persistent?hip/groin pain without any new weakness/n/t/p. No other MSK complaints. The wrist pain is decsribed as "frozen" in feeling. Overall however she is in her usual state of health. She is hopeful that the surgery will resolve her symptoms.      REVIEW OF SYSTEMS:  General: no fever, chills  Eyes: no vision changes  ENT: no hearing changes, nasal congestion, or sore throat  CV: no chest pain or palpitations  Pulm: no SOB, wheezing, cough, or hemoptysis  Abd/GI: no nausea, vomiting, diarrhea, constipation, abd pain  : no dysuria, hematuria, urinary frequency  MSK: left wrist joint pain  Neuro: no syncope, dizziness, focal weakness  Psych: no anxiety or depression  Endo: no heat or cold intolerance    PAST MEDICAL & SURGICAL HISTORY:  Parkinson disease  Hypertension    FAMILY HISTORY:  No pertinent    SOCIAL HISTORY:  [place]  Functional status: full adls iadls  Recent travel: [facility] three weeks ago  Occupation: retired  Tobacco use: none  EtOH use: very rare half glass of wine  Illicit drug use: none    MEDICATIONS:  MEDICATIONS  (STANDING):  chlorhexidine 2% Cloths 1 Application(s) Topical every 12 hours  enoxaparin Injectable 40 milliGRAM(s) SubCutaneous once  povidone iodine 10% Nasal Swab 1 Application(s) Both Nostrils once    MEDICATIONS  (PRN):  HYDROmorphone  Injectable 0.5 milliGRAM(s) IV Push every 6 hours PRN breakthrough pain  oxyCODONE    IR 10 milliGRAM(s) Oral every 4 hours PRN Severe Pain (7 - 10)  oxyCODONE    IR 5 milliGRAM(s) Oral every 4 hours PRN Moderate Pain (4 - 6)    ALLERGIES:  penicillin (Swelling)  sulfa drugs (Nausea)  codeine (Nausea)    VITAL SIGNS:  Vital Signs Last 24 Hrs  T(C): 37 (11 Dec 2024 19:00), Max: 37 (11 Dec 2024 19:00)  T(F): 98.6 (11 Dec 2024 19:00), Max: 98.6 (11 Dec 2024 19:00)  HR: 88 (11 Dec 2024 19:00) (88 - 98)  BP: 148/80 (11 Dec 2024 19:00) (135/79 - 148/80)  BP(mean): --  RR: 18 (11 Dec 2024 19:00) (16 - 18)  SpO2: 99% (11 Dec 2024 19:00) (98% - 99%)    Parameters below as of 11 Dec 2024 19:00  Patient On (Oxygen Delivery Method): room air    PHYSICAL EXAM:  Constitutional: resting comfortably in bed; NAD  Head: NC/AT  Eyes: PERRL, EOMI, anicteric sclera  ENT: no nasal discharge; uvula midline, no oropharyngeal erythema or exudates; MMM  Neck: supple; no JVD or thyromegaly  Respiratory: CTA B/L; no W/R/R, no retractions  Cardiac: +S1/S2; RRR; no M/R/G  Gastrointestinal: abdomen soft, NT/ND; no rebound or guarding; +BSx4  Back: spine midline, no bony tenderness or step-offs; no CVAT B/L  Extremities: WWP, no clubbing or cyanosis; no peripheral edema  Musculoskeletal: NROM x4; left wrist casted from hand to left elbow  Vascular: 2+ radial, femoral, DP/PT pulses B/L  Dermatologic: skin warm, dry and intact; no rashes, wounds, or scars  Lymphatic: no submandibular or cervical LAD  Neurologic: AAOx3; CNII-XII grossly intact; no focal deficits  Psychiatric: affect and characteristics of appearance, verbalizations, behaviors are appropriate    LABS:                        8.8    14.26 )-----------( 674      ( 11 Dec 2024 16:47 )             28.0     12-11    135  |  102  |  25[H]  ----------------------------<  117[H]  5.0   |  25  |  1.14    Ca    8.2[L]      11 Dec 2024 16:47      PT/INR - ( 11 Dec 2024 16:47 )   PT: 12.3 sec;   INR: 1.07          PTT - ( 11 Dec 2024 16:47 )  PTT:27.7 sec  Urinalysis Basic - ( 11 Dec 2024 17:46 )    Color: Yellow / Appearance: Clear / S.017 / pH: x  Gluc: x / Ketone: Trace mg/dL  / Bili: Negative / Urobili: 1.0 mg/dL   Blood: x / Protein: Trace mg/dL / Nitrite: Negative   Leuk Esterase: Small / RBC: 2 /HPF / WBC 2 /HPF   Sq Epi: x / Non Sq Epi: 2 /HPF / Bacteria: Negative /HPF    RADIOLOGY & ADDITIONAL TESTS:  2024: L wrist - Reduced displacement, angulation and impaction at the site of a distal radial comminuted fracture. Redemonstration ulnar styloid fracture.. Stable osteoarthrosis.    2024: L hip - The patient is status post left hip hemiarthroplasty. There is a periprosthetic fracture of the proximal femur in the trochanteric and subtrochanteric region. The right hip is normal in appearance.

## 2024-12-11 NOTE — ED ADULT NURSE NOTE - NS ED NURSE DISCH DISPOSITION
2017    Anthony Smith M.D.  [address] [place] [address]    RE:  PEGGY GHOSH  [facility].:  [id number]    Dear Jaswant Smith and Josefa:    I saw Peggy Ghosh at [facility] as a new patient on 2017.  This is a   10-year-old girl who comes with regard to headaches and an abnormal MRI.  She   has been having headaches for about a year that were occurring perhaps once   every two weeks until the last two to three months when they have increased in   frequency and severity.  They may occur several days in a row and then vanish   for a week or more.  So, they can occur daily.  These are bifrontal headaches   that last from 30 minutes to two hours and are associated with nausea, but no   vomiting, photophobia or phonophobia.  They are severe, causing her to cry and   interrupt her activities.  They may sometimes be precipitated by stress.  She   does not drink caffeine.  Tylenol and Motrin seem to improve her headaches.  She   has had a normal eye exam.  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  Her   vision, hearing, speech, swallowing, strength and coordination are normal.  No   seizures.    Normal 6-pound, 37-week .  No other illness, surgery, medication, allergy   or injury.  Immunizations are up to date.  She will be in the fifth grade and   makes A's and B's in school.  There is a family history of migraine in maternal   relatives.  Her parents share custody and are employed at [employer].    GENERAL REVIEW OF SYSTEMS:  Shows otherwise normal constitution, head, eyes,   ears, nose, throat, mouth, heart, lungs, GI, , skin, musculoskeletal,   neurologic,  psychiatric, endocrine, hematologic and immune function.    PHYSICAL EXAMINATION:  VITAL SIGNS:  Weight 46.3 kg, height 147.5 cm, blood pressure 121/67 and head   circumference 53 cm.  GENERAL:  Normal body habitus.  HEAD, EYES, EARS, NOSE AND THROAT:  Normal.  NECK:  Supple.  No mass.  CHEST:  Clear.  No murmurs.  ABDOMEN:  Benign.  NEUROLOGIC:  Appropriate orientation, attention, language, knowledge and memory   for age.  Cranial nerves intact with normal smell bilaterally, 20/30 acuity both   eyes (she did not have her glasses) with normal fundi, fields, pupils, eye   movements, facial sensation and movements, hearing, gag, neck and trapezius   strength and tongue protrusion.  Deep tendon reflexes 2+, no pathologic   reflexes.  Muscle tone and strength normal in all four extremities.  Normal   gait, no ataxia or intention tremor.  Sensation intact distally to touch.    In summary, Peggy Ghosh appears quite neurologically intact and I do not think   her headaches are in anyway associated with the abnormal left frontal area on   MRI.  She may be beginning to have migraine.  I have placed her on amitriptyline   10 mg at bedtime as a prophylactic agent for headaches and we will continue to   use Tylenol/Motrin for her acute headaches.  I have referred her to Neurosurgery   for evaluation of her MRI.  We will certainly repeat this in the next few   months.  I will see her back in the next month or two for followup or sooner if   need be.    Sincerely,      ANGELA  dd: 08/08/2017 16:03:20 (CDT)  td: 08/09/2017 02:45:07 (CDT)  Doc ID   #[id number]  Job ID #[id number]    CC: Roman Rivero M.D.    Dictation #2  MRN:[id number]  CSN:[id number]  This office note has been dictated.     Admitted

## 2024-12-11 NOTE — H&P ADULT - HISTORY OF PRESENT ILLNESS
Orthopaedic Surgery Consult Note  For Surgeon: Dr. Loja    HPI:  79-year-old with prior history of Parkinson's, hypertension and mechanical fall on 11/24 resulting in L DRF and L periprosthetic hip fracture    Pt was initally seen and admitted 11/25  and sent to [facility],with  follow-up to see Dr. Loja. Pt with repeat imaging with worsening of fracture. Plan for admission for L distal radius ORIF and L hip surgery at a later date.    Per Dr. Loja wrist would not heal properly without surgery, [facility]      Patient at baseline ambulates without assistive device. Ambulates with walker since injury    Pt presents today with symptoms of L wrist pain and L hip pain.?Denies HS/LOC. C/o?persistent?hip/groin  pain without any new weakness/n/t/p. No other MSK complaints.        Allergies  penicillin (Swelling)  sulfa drugs (Nausea)  codeine (Nausea)    Intolerances      PAST MEDICAL & SURGICAL HISTORY:  Parkinson disease  Hypertension      No significant past surgical history        MEDICATIONS  (STANDING):  chlorhexidine 2% Cloths 1 Application(s) Topical every 12 hours  enoxaparin Injectable 40 milliGRAM(s) SubCutaneous once  povidone iodine 10% Nasal Swab 1 Application(s) Both Nostrils once    MEDICATIONS  (PRN):  HYDROmorphone  Injectable 0.5 milliGRAM(s) IV Push every 6 hours PRN Breakthrough pain  oxyCODONE    IR 10 milliGRAM(s) Oral every 4 hours PRN Severe Pain (7 - 10)  oxyCODONE    IR 5 milliGRAM(s) Oral every 4 hours PRN Moderate Pain (4 - 6)      Vital Signs Last 24 Hrs  T(C): 37 (11 Dec 2024 19:00), Max: 37 (11 Dec 2024 19:00)  T(F): 98.6 (11 Dec 2024 19:00), Max: 98.6 (11 Dec 2024 19:00)  HR: 88 (11 Dec 2024 19:00) (88 - 98)  BP: 148/80 (11 Dec 2024 19:00) (135/79 - 148/80)  BP(mean): --  RR: 18 (11 Dec 2024 19:00) (16 - 18)  SpO2: 99% (11 Dec 2024 19:00) (98% - 99%)    Parameters below as of 11 Dec 2024 19:00  Patient On (Oxygen Delivery Method): room air

## 2024-12-11 NOTE — CONSULT NOTE ADULT - PROBLEM SELECTOR RECOMMENDATION 3
Hgb on admission 8.8, MCV 88.9. However slow downtrend noted from 11/24/24 where hemoglobin was 11.9.   Currently no signs of active bleeding (no hematochezia, melena, hemoptysis, hematuria) on exam.  - ensure up to date on age appropriate cancer screening.  - obtain iron panel, LDH, haptoglobin, retic count if clinically appropriate  - recommend rechecking cbc prior to going to OR  - trend CBC  - maintain active T&S  - transfuse if Hgb <7

## 2024-12-11 NOTE — ED ADULT NURSE NOTE - OBJECTIVE STATEMENT
Pt A&Ox4 hx Parkinson's coming in from rehab facility for admission for OR. Pt has left distal radial fracture and fracture of left hip after mechanical fall on 11/24. Pt went to rehab and had follow-up outpatient with ortho. Pt sent in to ED for admission for OR to fix fractures. Pt currently denies pain. Limited ROM in left lower extremity. Left wrist in splint. Pt able to move left fingers. Denies numbness/tingling, fevers.

## 2024-12-11 NOTE — ED ADULT TRIAGE NOTE - CHIEF COMPLAINT QUOTE
Patient brought in by EMS for ortho. Pt. recently had L wrist and L hip fracture and per EMS, sent to have them reset. Pt. denies pain. Able to wiggle fingers/toes. Arrives with IV to top of R hand, unknown how long it has been in place.

## 2024-12-11 NOTE — CONSULT NOTE ADULT - PROBLEM SELECTOR RECOMMENDATION 4
Presented with a platelet of 674.  Baseline of 200s per chart review.   Differential includes Reactive/Secondary thrombocytosis Iron deficiency anemia versus inflammatory.  - iron studies as above  - recheck cbc prior to going to OR

## 2024-12-11 NOTE — CONSULT NOTE ADULT - ASSESSMENT
Ms. Osorio is a 78 yo F with a PMHx of Parkinson's disease, hypertension, prior left hemiarthroplasty in 2021, and a recent admission (11/25/24-11/26/24) after a mechanical fall subsequently found to have L hip periprosthetic fracture and L distal radial fracture, now presenting back to [facility] per outpatient surgeon request for left distal radius open reduction and internal fixation surgery. Medicine consulted for preoperative clearance.     #Pre Operative Clearance  #Left distal radius open reduction and internal fixation surgery  -EKG:     -Social:  -Home Medications: losartan 100mg tab qd, selegiline 5mg BID, carbidopa levodopa 23.75-95 XR 2 tabs TID, carbidopa 25 1 tab TID, metoclopramide  -Self-reported physical activity: no limitation walking on flat ground or stairs  -Personal or family history of reaction to anesthesia ____    Assessment:  -Mets >4  - able to walk up > 2 flights of stairs without stopping  -RCRI - Class ___ risk, indicating ___% 30-day risk of death, MI, or cardiac arrest  -Denise - ___% risk of MI or cardiac arrest within 30 days of surgery  - Duke Activity Status Index Score ____    _____ is ___-risk for ___-risk surgery.  There are no apparent contraindications for ________   Ms. Osorio is a 80 yo F with a PMHx of Parkinson's disease, hypertension, prior left hemiarthroplasty in 2021, and a recent admission (11/25/24-11/26/24) after a mechanical fall subsequently found to have L hip periprosthetic fracture and L distal radial fracture, now presenting back to [facility] per outpatient surgeon request for left distal radius open reduction and internal fixation surgery. Medicine consulted for preoperative clearance.     #Pre Operative Clearance  #Left distal radius open reduction and internal fixation surgery  -EKG: sinus rhythm, rate 93, TWI aVR/V1, qtc 442  -Social: full adls and iadls  -Home Medications: losartan 100mg tab qd, selegiline 5mg BID, carbidopa levodopa 23.75-95 XR 2 tabs TID, carbidopa 25 1 tab TID  -Self-reported physical activity: no limitation walking on flat ground or stairs  -Personal or family history of reaction to anesthesia: none     Assessment:  - Mets >4  - able to walk up > 2 flights of stairs without stopping  - RCRI - 0 points indicating 3.9% 30-day risk of death, MI, or cardiac arrest  - Walker - 0% risk of MI or cardiac arrest within 30 days of surgery  - Duke Activity Status Index Score 50.7 out of 58.2  - fine to continue home medications, can give losartan tonight and evaluate need for dosing on 12/12 post op    Ms. Osorio is low-risk for low-risk surgery.  There are no apparent contraindications for Left distal radius open reduction and internal fixation surgery   Ms. Osorio is a 80 yo F with a PMHx of Parkinson's disease, hypertension, prior left hemiarthroplasty in 2021, and a recent admission (11/25/24-11/26/24) after a mechanical fall subsequently found to have L hip periprosthetic fracture and L distal radial fracture, now presenting back to [facility] per outpatient surgeon request for left distal radius open reduction and internal fixation surgery. Medicine consulted for preoperative clearance.     #Pre Operative Clearance  #Left distal radius open reduction and internal fixation surgery  -EKG: sinus rhythm, rate 93, TWI aVR/V1, qtc 442  -Social: full adls and iadls  -Home Medications: losartan 100mg tab qd, selegiline 5mg BID, carbidopa levodopa 23.75-95 XR 2 tabs TID, carbidopa 25 1 tab TID  -Self-reported physical activity: no limitation walking on flat ground or stairs  -Personal or family history of reaction to anesthesia: none     Assessment:  - Mets >4  - able to walk up > 2 flights of stairs without stopping  - RCRI - 0 points indicating 3.9% 30-day risk of death, MI, or cardiac arrest  - Walker - 0% risk of MI or cardiac arrest within 30 days of surgery  - Duke Activity Status Index Score 50.7 out of 58.2  - NSQIP 1.9% risk of any complication  - fine to continue home medications, can give losartan tonight and evaluate need for dosing on 12/12 post op    Ms. Osorio is low-risk for low-risk surgery.  There are no apparent contraindications for Left distal radius open reduction and internal fixation surgery   Ms. Osorio is a 78 yo F with a PMHx of Parkinson's disease, hypertension, prior left hemiarthroplasty in 2021, and a recent admission (11/25/24-11/26/24) after a mechanical fall subsequently found to have L hip periprosthetic fracture and L distal radial fracture, now presenting back to [facility] per outpatient surgeon request for left distal radius open reduction and internal fixation surgery. Medicine consulted for preoperative clearance.

## 2024-12-11 NOTE — CONSULT NOTE ADULT - PROBLEM SELECTOR RECOMMENDATION 5
Multi year history.   Home medications: selegiline 5mg BID, carbidopa levodopa 23.75-95 XR 2 tabs TID, carbidopa 25 1 tab TID  -  home medications Multi year history.   Home medications: selegiline 5mg BID, carbidopa levodopa 23.75-95 XR 2 tabs TID, carbidopa 25 1 tab TID, domperidone 10mg tid  -  home medications

## 2024-12-11 NOTE — ED PROVIDER NOTE - MUSCULOSKELETAL, MLM
Spine appears normal, L wrist / elbow in splint, no blisters, motor intact x 3 , sensation intact, well perfused. , L hip w overlying minimally tender contusion/ motor / sensation / pulses intact,

## 2024-12-11 NOTE — ED ADULT NURSE NOTE - NSFALLHARMRISKINTERV_ED_ALL_ED

## 2024-12-11 NOTE — PATIENT PROFILE ADULT - FUNCTIONAL ASSESSMENT - BASIC MOBILITY 6.
The patient is a 27y Female complaining of abdominal pain.  2-calculated by average/Not able to assess (calculate score using [facility] averaging method)

## 2024-12-11 NOTE — H&P ADULT - NSHPPHYSICALEXAM_GEN_ALL_CORE
CONSTITUTIONAL: Well groomed, no apparent distress   EYES: PERRLA and symmetric  ENMT: Oral mucosa with moist membranes.   NECK: Supple, symmetric and without tracheal deviation    RESP: No respiratory distress, no use of accessory muscles  CV: No peripheral edema  SKIN: Skin intact  MSK:  LUE  Sugar tong splint  Wiggles finger  SILT   Fingers WWPF  Brisk cap refill       LLE  Skin intact?no open injuries;    TTP?to?left??hip/groin   Unable to SLR?2/2 pain, able to flex/ext at knee, PF/DF intact, toes mobile   EHL/TA/GS intact to motor   Pain with log roll or axial loading    SILT Saph/ashlee/sp/dp/tib   Palpable DP & PT   Brisk cap refill distally

## 2024-12-11 NOTE — ED PROVIDER NOTE - OBJECTIVE STATEMENT
Flu swab collected,   Negative orthostatic bp   79-year-old with history of Parkinson's, hypertension, had mechanical fall on November 24 and seen and admitted here on November 25 after left distal radius fracture and fracture of left hip, [facility], and went for Ortho follow-up to see Dr. Loja, as per Ortho recommendations orthopedist did not think wrist would heal properly without surgery, advised coming to [facility] for admission for surgical intervention.  Pain under control, no fever

## 2024-12-11 NOTE — H&P ADULT - NSHPLABSRESULTS_GEN_ALL_CORE
Labs                        8.8    14.26 )-----------( 674      ( 11 Dec 2024 16:47 )             28.0     12-11    135  |  102  |  25[H]  ----------------------------<  117[H]  5.0   |  25  |  1.14    Ca    8.2[L]      11 Dec 2024 16:47        Imaging:  L wrist - Reduced displacement, angulation and impaction at the site of a distal radial comminuted fracture. Redemonstration ulnar styloid fracture.. Stable osteoarthrosis.    L hip - The patient is status post left hip hemiarthroplasty. There is a periprosthetic fracture of the proximal femur in the trochanteric and subtrochanteric region. The right hip is normal in appearance.

## 2024-12-11 NOTE — CONSULT NOTE ADULT - PROBLEM SELECTOR RECOMMENDATION 9
#Left distal radius open reduction and internal fixation surgery  EKG: sinus rhythm, rate 93, TWI aVR/V1, qtc 442  Social: full adls and iadls  Home Medications: losartan 100mg tab qd, selegiline 5mg BID, carbidopa levodopa 23.75-95 XR 2 tabs TID, carbidopa 25 1 tab TID  Self-reported physical activity: no limitation walking on flat ground or stairs  Personal or family history of reaction to anesthesia: none     Assessment:  - Mets >4  - able to walk up > 2 flights of stairs without stopping due to chest pain or chest tightness. Of note, her ability to go up and down stairs has been impeded recently in the setting of her hip pain post fall in November. However prior to that fall patient went to the gym 5x/week and had no difficulty with 2 flights of stairs.   - RCRI - 0 points indicating 3.9% 30-day risk of death, MI, or cardiac arrest  - Walker - 0% risk of MI or cardiac arrest within 30 days of surgery  - Duke Activity Status Index Score 50.7 out of 58.2  - NSQIP 1.9% risk of any complication (average risk is 2.1% for this procedure)  - hold losartan, continue remainder of home medications  - DVT ppx per primary team    Ms. Osorio is low-risk for intermediate risk surgery.  There are no apparent contraindications for Left distal radius open reduction and internal fixation surgery #Left distal radius open reduction and internal fixation surgery  EKG: sinus rhythm, rate 93, TWI aVR/V1, qtc 442  Social: full adls and iadls  Home Medications: losartan 100mg tab qd, selegiline 5mg BID, carbidopa levodopa 23.75-95 XR 2 tabs TID, carbidopa 25 1 tab TID  Self-reported physical activity: no limitation walking on flat ground or stairs  Personal or family history of reaction to anesthesia: none     Assessment:  - Mets >4  - able to walk up > 2 flights of stairs without stopping due to chest pain or chest tightness. Of note, her ability to go up and down stairs has been impeded recently in the setting of her hip pain post fall in November. However prior to that fall patient went to the gym 5x/week and had no difficulty with 2 flights of stairs.   - RCRI - 0 points indicating 3.9% 30-day risk of death, MI, or cardiac arrest  - Walker - 0% risk of MI or cardiac arrest within 30 days of surgery  - Duke Activity Status Index Score 50.7 out of 58.2  - NSQIP 1.9% risk of any complication (average risk is 2.1% for this procedure)  - hold losartan, continue remainder of home medications  - DVT ppx per primary team  - recommend rechecking cbc prior to going to OR given downtrended hemoglobin and elevated platelets on cbc suggestive of a potentially concentrated lab draw, thus true hemoglobin may be lower than 8.8    Ms. Osorio is low-risk for intermediate risk surgery.  There are no apparent contraindications for Left distal radius open reduction and internal fixation surgery #Left distal radius open reduction and internal fixation surgery  EKG: sinus rhythm, rate 93, TWI aVR/V1, qtc 442  Social: full adls and iadls  Home Medications: losartan 100mg tab qd, selegiline 5mg BID, carbidopa levodopa 23.75-95 XR 2 tabs TID, carbidopa 25 1 tab TID, domperidone 10mg tid, vit d3 qd and vit b 12 qd  Self-reported physical activity: no limitation walking on flat ground or stairs  Personal or family history of reaction to anesthesia: none     Assessment:  - Mets >4  - able to walk up > 2 flights of stairs without stopping due to chest pain or chest tightness. Of note, her ability to go up and down stairs has been impeded recently in the setting of her hip pain post fall in November. However prior to that fall patient went to the gym 5x/week and had no difficulty with 2 flights of stairs.   - RCRI - 0 points indicating 3.9% 30-day risk of death, MI, or cardiac arrest  - Walker - 0% risk of MI or cardiac arrest within 30 days of surgery  - Duke Activity Status Index Score 50.7 out of 58.2  - NSQIP 1.9% risk of any complication (average risk is 2.1% for this procedure)  - hold losartan, continue remainder of home medications  - DVT ppx per primary team  - recommend rechecking cbc prior to going to OR given downtrended hemoglobin and elevated platelets on cbc suggestive of a potentially concentrated lab draw, thus true hemoglobin may be lower than 8.8    Ms. Osorio is low-risk for intermediate risk surgery.  There are no apparent contraindications for Left distal radius open reduction and internal fixation surgery

## 2024-12-11 NOTE — CONSULT NOTE ADULT - ATTENDING COMMENTS
78 yo F with PMHx Parkinsons disease, HTN, L hx mireya-arthroplasty (2021), recent admission for mechanical fall c/b L effie-prosthetic fracture (11/25-11/26) BIBEMS from [facility] at urging of outpatient provider for planned admission for L distal radius ORIF with Dr. Loja on 12/12. Medicine consulted 12/11 for pre-operative clearance.     VSS. Labs reviewed. WBC 14.26 with Plt 674. Suspect degree of hemo-concentration vs reactive. Hb 8.8 (MCV 88.9). Hb has been slowly downtrending from 11.9 on 11/24. No hx of anemia at baseline. Physical exam without concern for hematoma at this time. METS >4 (Not limited by CP/SOB; recently limited i/s/o fall/fx). No significant cardiac/pulmonary history apart from HTN. EKG reviewed. Compared to prior EKG on 11/27. No prior TTE available for review in EMR/HIE. No known adverse reactions to anesthesia in the past in self or first-degree relatives. RCRI 0 points (Class I Risk) ~ 3.9% for 30d risk of death, MI, or cardiac arrest. Walker score 0.2% for risk of MI or cardiac arrest, intraoperatively or up to 30d post-op.      [ ] Low-intermediate risk for intermediate-risk procedure    [ ] Repeat CBC/Coags pre-operatively i/s/o downtrending Hb   - Close monitoring for signs/sx of bleeding   [ ] Pain control + DVT ppx per primary team   [ ] Hold home Losartan effie-operatively    [ ] Continue PD home medications* 78 yo F with PMHx Parkinsons disease, HTN, L hx mireya-arthroplasty (2021), recent admission for mechanical fall c/b L effie-prosthetic fracture (11/25-11/26) BIBEMS from NH at urging of outpatient provider for planned admission for L distal radius ORIF with Dr. Loja on 12/12. Medicine consulted 12/11 for pre-operative clearance.     VSS. Labs reviewed. WBC 14.26 with Plt 674. Suspect degree of hemo-concentration vs reactive. Hb 8.8 (MCV 88.9). Hb has been slowly downtrending from 11.9 on 11/24. No hx of anemia at baseline. Physical exam without concern for hematoma at this time. METS >4 (Not limited by CP/SOB; recently limited i/s/o fall/fx). No significant cardiac/pulmonary history apart from HTN. EKG reviewed. Compared to prior EKG on 11/27. No prior TTE available for review in EMR/HIE. No known adverse reactions to anesthesia in the past in self or first-degree relatives. RCRI 0 points (Class I Risk) ~ 3.9% for 30d risk of death, MI, or cardiac arrest. Walker score 0.2% for risk of MI or cardiac arrest, intraoperatively or up to 30d post-op.      [ ] Low-risk for intermediate-risk procedure    [ ] Repeat CBC/Coags pre-operatively i/s/o downtrending Hb   - Close monitoring for signs/sx of bleeding   [ ] Pain control + DVT ppx per primary team   [ ] Hold home Losartan effie-operatively    [ ] Continue PD home medications*

## 2024-12-11 NOTE — PATIENT PROFILE ADULT - FUNCTIONAL ASSESSMENT - DAILY ACTIVITY 6.
3 = A little assistance Birth Control Pills Pregnancy And Lactation Text: This medication should be avoided if pregnant and for the first 30 days post-partum.

## 2024-12-11 NOTE — CONSULT NOTE ADULT - PROBLEM SELECTOR RECOMMENDATION 2
Multi year history of hypertension.   On admission, presented with BP 130s-140s systolic. Well controlled on home losartan 100mg qd.   - CTM for CP/CT  - encourage low salt in diet  - hold losartan pre operatively

## 2024-12-11 NOTE — ED PROVIDER NOTE - CLINICAL SUMMARY MEDICAL DECISION MAKING FREE TEXT BOX
79-year-old sent in by orthopedics for surgical intervention for distal radius fracture, patient neurovascularly intact, will send preop send admit to orthopedics

## 2024-12-12 LAB
ANION GAP SERPL CALC-SCNC: 12 MMOL/L — SIGNIFICANT CHANGE UP (ref 5–17)
BLD GP AB SCN SERPL QL: NEGATIVE — SIGNIFICANT CHANGE UP
BUN SERPL-MCNC: 22 MG/DL — SIGNIFICANT CHANGE UP (ref 7–23)
CALCIUM SERPL-MCNC: 8.3 MG/DL — LOW (ref 8.4–10.5)
CHLORIDE SERPL-SCNC: 104 MMOL/L — SIGNIFICANT CHANGE UP (ref 96–108)
CO2 SERPL-SCNC: 22 MMOL/L — SIGNIFICANT CHANGE UP (ref 22–31)
CREAT SERPL-MCNC: 1.01 MG/DL — SIGNIFICANT CHANGE UP (ref 0.5–1.3)
EGFR: 57 ML/MIN/1.73M2 — LOW
GLUCOSE SERPL-MCNC: 157 MG/DL — HIGH (ref 70–99)
HCT VFR BLD CALC: 29.1 % — LOW (ref 34.5–45)
HGB BLD-MCNC: 9.1 G/DL — LOW (ref 11.5–15.5)
MCHC RBC-ENTMCNC: 27.7 PG — SIGNIFICANT CHANGE UP (ref 27–34)
MCHC RBC-ENTMCNC: 31.3 G/DL — LOW (ref 32–36)
MCV RBC AUTO: 88.4 FL — SIGNIFICANT CHANGE UP (ref 80–100)
NRBC # BLD: 0 /100 WBCS — SIGNIFICANT CHANGE UP (ref 0–0)
PLATELET # BLD AUTO: 635 K/UL — HIGH (ref 150–400)
POTASSIUM SERPL-MCNC: 4.3 MMOL/L — SIGNIFICANT CHANGE UP (ref 3.5–5.3)
POTASSIUM SERPL-SCNC: 4.3 MMOL/L — SIGNIFICANT CHANGE UP (ref 3.5–5.3)
RBC # BLD: 3.29 M/UL — LOW (ref 3.8–5.2)
RBC # FLD: 14.2 % — SIGNIFICANT CHANGE UP (ref 10.3–14.5)
RH IG SCN BLD-IMP: POSITIVE — SIGNIFICANT CHANGE UP
SODIUM SERPL-SCNC: 138 MMOL/L — SIGNIFICANT CHANGE UP (ref 135–145)
WBC # BLD: 13.52 K/UL — HIGH (ref 3.8–10.5)
WBC # FLD AUTO: 13.52 K/UL — HIGH (ref 3.8–10.5)

## 2024-12-12 PROCEDURE — 99233 SBSQ HOSP IP/OBS HIGH 50: CPT

## 2024-12-12 RX ORDER — INFLUENZA A VIRUS A/WISCONSIN/588/2019 (H1N1) RECOMBINANT HEMAGGLUTININ ANTIGEN, INFLUENZA A VIRUS A/DARWIN/6/2021 (H3N2) RECOMBINANT HEMAGGLUTININ ANTIGEN, INFLUENZA B VIRUS B/AUSTRIA/1359417/2021 RECOMBINANT HEMAGGLUTININ ANTIGEN, AND INFLUENZA B VIRUS B/PHUKET/3073/2013 RECOMBINANT HEMAGGLUTININ ANTIGEN 45; 45; 45; 45 UG/.5ML; UG/.5ML; UG/.5ML; UG/.5ML
0.5 INJECTION INTRAMUSCULAR ONCE
Refills: 0 | Status: DISCONTINUED | OUTPATIENT
Start: 2024-12-12 | End: 2024-12-26

## 2024-12-12 RX ORDER — POLYETHYLENE GLYCOL 3350 17 G/DOSE
17 POWDER (GRAM) ORAL AT BEDTIME
Refills: 0 | Status: DISCONTINUED | OUTPATIENT
Start: 2024-12-12 | End: 2024-12-20

## 2024-12-12 RX ORDER — ENOXAPARIN SODIUM 60 MG/.6ML
40 INJECTION INTRAVENOUS; SUBCUTANEOUS EVERY 24 HOURS
Refills: 0 | Status: DISCONTINUED | OUTPATIENT
Start: 2024-12-12 | End: 2024-12-12

## 2024-12-12 RX ORDER — ENOXAPARIN SODIUM 60 MG/.6ML
40 INJECTION INTRAVENOUS; SUBCUTANEOUS EVERY 24 HOURS
Refills: 0 | Status: DISCONTINUED | OUTPATIENT
Start: 2024-12-12 | End: 2024-12-15

## 2024-12-12 RX ORDER — POVIDONE IODINE USP, 10% W/W 10 MG/ML
1 SWAB TOPICAL ONCE
Refills: 0 | Status: DISCONTINUED | OUTPATIENT
Start: 2024-12-12 | End: 2024-12-26

## 2024-12-12 RX ORDER — CHLORHEXIDINE GLUCONATE 1.2 MG/ML
1 RINSE ORAL
Refills: 0 | Status: DISCONTINUED | OUTPATIENT
Start: 2024-12-12 | End: 2024-12-15

## 2024-12-12 RX ORDER — SODIUM CHLORIDE 9 MG/ML
1000 INJECTION, SOLUTION INTRAMUSCULAR; INTRAVENOUS; SUBCUTANEOUS
Refills: 0 | Status: DISCONTINUED | OUTPATIENT
Start: 2024-12-12 | End: 2024-12-12

## 2024-12-12 RX ORDER — MAGNESIUM HYDROXIDE 400 MG/5ML
30 SUSPENSION, ORAL (FINAL DOSE FORM) ORAL DAILY
Refills: 0 | Status: DISCONTINUED | OUTPATIENT
Start: 2024-12-12 | End: 2024-12-26

## 2024-12-12 RX ORDER — ONDANSETRON 4 MG/1
4 TABLET ORAL EVERY 6 HOURS
Refills: 0 | Status: DISCONTINUED | OUTPATIENT
Start: 2024-12-12 | End: 2024-12-18

## 2024-12-12 RX ORDER — SODIUM CHLORIDE 9 MG/ML
1000 INJECTION, SOLUTION INTRAMUSCULAR; INTRAVENOUS; SUBCUTANEOUS
Refills: 0 | Status: DISCONTINUED | OUTPATIENT
Start: 2024-12-12 | End: 2024-12-16

## 2024-12-12 RX ORDER — HYDROMORPHONE HCL 4 MG
0.4 TABLET ORAL ONCE
Refills: 0 | Status: DISCONTINUED | OUTPATIENT
Start: 2024-12-12 | End: 2024-12-19

## 2024-12-12 RX ORDER — SENNOSIDES 8.6 MG/1
2 TABLET, FILM COATED ORAL AT BEDTIME
Refills: 0 | Status: DISCONTINUED | OUTPATIENT
Start: 2024-12-12 | End: 2024-12-20

## 2024-12-12 RX ORDER — ACETAMINOPHEN 80 MG/.8ML
1000 SOLUTION/ DROPS ORAL EVERY 8 HOURS
Refills: 0 | Status: DISCONTINUED | OUTPATIENT
Start: 2024-12-12 | End: 2024-12-16

## 2024-12-12 RX ORDER — LEVODOPA AND CARBIDOPA 145; 36.25 MG/1; MG/1
2 CAPSULE, EXTENDED RELEASE ORAL
Refills: 0 | Status: DISCONTINUED | OUTPATIENT
Start: 2024-12-12 | End: 2024-12-26

## 2024-12-12 RX ADMIN — SODIUM CHLORIDE 150 MILLILITER(S): 9 INJECTION, SOLUTION INTRAMUSCULAR; INTRAVENOUS; SUBCUTANEOUS at 12:25

## 2024-12-12 RX ADMIN — CHLORHEXIDINE GLUCONATE 1 APPLICATION(S): 1.2 RINSE ORAL at 01:09

## 2024-12-12 RX ADMIN — CHLORHEXIDINE GLUCONATE 1 APPLICATION(S): 1.2 RINSE ORAL at 06:13

## 2024-12-12 RX ADMIN — LEVODOPA AND CARBIDOPA 2 CAPSULE(S): 145; 36.25 CAPSULE, EXTENDED RELEASE ORAL at 18:49

## 2024-12-12 RX ADMIN — CARBIDOPA 25 MILLIGRAM(S): 25 TABLET ORAL at 18:52

## 2024-12-12 RX ADMIN — LEVODOPA AND CARBIDOPA 1 CAPSULE(S): 145; 36.25 CAPSULE, EXTENDED RELEASE ORAL at 06:15

## 2024-12-12 RX ADMIN — CARBIDOPA 25 MILLIGRAM(S): 25 TABLET ORAL at 06:16

## 2024-12-12 RX ADMIN — POVIDONE IODINE USP, 10% W/W 1 APPLICATION(S): 10 SWAB TOPICAL at 06:33

## 2024-12-12 RX ADMIN — ENOXAPARIN SODIUM 40 MILLIGRAM(S): 60 INJECTION INTRAVENOUS; SUBCUTANEOUS at 18:49

## 2024-12-12 RX ADMIN — SODIUM CHLORIDE 150 MILLILITER(S): 9 INJECTION, SOLUTION INTRAMUSCULAR; INTRAVENOUS; SUBCUTANEOUS at 19:07

## 2024-12-12 NOTE — PROGRESS NOTE ADULT - ASSESSMENT
Ms. Alma Osorio is a 79/F with Parkinson disease, HTN, prior left hemiarthroplasty in 2021 and a recent admission (11/25/24-11/26/24) after a mechanical fall subsequently found to have L hip periprosthetic fracture and L distal radial fracture who presented back to [facility] per outpatient surgeon request for left distal radius open reduction and internal fixation surgery. She underwent closed reduction of the left distal radius. She is planned for left hip revision for left hip periprosthetic fracture.    Recommendations:    #Left distal radius fracture  #Post op status  - Provide adequate analgesia, Incentive spirometry, mobilize with fall precautions, bowel regimen, DVT prophylaxis  - PT/OT    #Left hip periprosthetic fracture  #Perioperative risk stratification for planned left hip revision  - RCRI 0: 3.9% 30 day risk of MI, death or cardiac arrest  - CONNELLY 0.2% risk of MI or cardiac arrest intra op or up to 30 days post op  - METS > 4  - please send labs (post closed reduction)  - patient at low to intermediate risk of cardiovascular complications from an intermediate risk procedure    #Anemia, acute vs chronic  #Thrombocytosis, possibly reactive  - repeat CBC  - Monitor Hgb  - Transfuse for Hgb < 7  - Ensure Type and Screen available  - can send iron, TIBC, retic, ferritin, B12, haptoglobin    #HTN (hypertension)  - on Losartan 100 mg daily  - BP monitoring    #Parkinson disease  - on selegiline 5mg BID, carbidopa levodopa 23.75-95 XR 2 tabs TID, carbidopa 25 1 tab TID, domperidone 10mg tid    Feel free to reach out for any questions. Recommendations discussed with primary team.

## 2024-12-12 NOTE — PRE-OP CHECKLIST - INTERNAL PROSTHESES
Pt states having left hip and left wrist surgery, potentially having metal in her hip but is unsure./yes(specify)

## 2024-12-12 NOTE — PROGRESS NOTE ADULT - SUBJECTIVE AND OBJECTIVE BOX
Patient is a 79y old  Female who presents with a chief complaint of Left distal radius open reduction and internal fixation surgery (11 Dec 2024 20:41).    Patient seen and examined today. She underwent a closed reduction for a distal radius fracture today. She denies significant post op pain. She denies chest pain, dyspnea, orthopnea, dizziness, palpitations, nausea.    She denies history of MI, CVA, CHF, bleeding or clotting disorders. She is active at baseline and goes to the gym 5 days a week and does pilates regularly. She could walk up more than 2 flights of stairs and walk more than 4 blocks without stopping.    Allergies    penicillin (Swelling)  sulfa drugs (Nausea)  codeine (Nausea)    MEDICATIONS  (STANDING):  acetaminophen     Tablet .. 1000 milliGRAM(s) Oral every 8 hours  carbidopa 25 milliGRAM(s) Oral <User Schedule>  carbidopa 23.75 mG/levodopa 95 mG ER 1 Capsule(s) Oral <User Schedule>  chlorhexidine 2% Cloths 1 Application(s) Topical <User Schedule>  Domperidone 10 milliGRAM(s) 1 Tablet(s) Oral <User Schedule>  HYDROmorphone  Injectable 0.4 milliGRAM(s) IV Push once  influenza  Vaccine (HIGH DOSE) 0.5 milliLiter(s) IntraMuscular once  polyethylene glycol 3350 17 Gram(s) Oral at bedtime  povidone iodine 10% Nasal Swab 1 Application(s) Both Nostrils once  senna 2 Tablet(s) Oral at bedtime  sodium chloride 0.9%. 1000 milliLiter(s) (150 mL/Hr) IV Continuous <Continuous>    MEDICATIONS  (PRN):  HYDROmorphone  Injectable 0.5 milliGRAM(s) IV Push every 6 hours PRN breakthrough pain  magnesium hydroxide Suspension 30 milliLiter(s) Oral daily PRN Constipation  ondansetron Injectable 4 milliGRAM(s) IV Push every 6 hours PRN Nausea and/or Vomiting  oxyCODONE    IR 10 milliGRAM(s) Oral every 4 hours PRN Severe Pain (7 - 10)  oxyCODONE    IR 5 milliGRAM(s) Oral every 4 hours PRN Moderate Pain (4 - 6)    Vital Signs Last 24 Hrs  T(C): 36.6 (12-12-24 @ 11:09), Max: 37 (12-11-24 @ 19:00)  T(F): 97.8 (12-12-24 @ 11:09), Max: 98.6 (12-11-24 @ 19:00)  HR: 90 (12-12-24 @ 11:09) (70 - 98)  BP: 103/69 (12-12-24 @ 11:09) (103/69 - 160/85)  BP(mean): 75 (12-12-24 @ 11:09) (75 - 96)  RR: 19 (12-12-24 @ 11:09) (16 - 22)  SpO2: 97% (12-12-24 @ 11:09) (95% - 100%)    I&O's Summary    11 Dec 2024 07:01  -  12 Dec 2024 07:00  --------------------------------------------------------  IN: 300 mL / OUT: 0 mL / NET: 300 mL    12 Dec 2024 07:01  -  12 Dec 2024 13:58  --------------------------------------------------------  IN: 315 mL / OUT: 0 mL / NET: 315 mL    Oxygen Saturation Index= Unable to calculate   [Based on FiO2 = Unknown, SpO2 = 97(12/12/2024 11:09), MAP = Unknown]    PHYSICAL EXAM:  GENERAL: NAD  HEAD: Normocephalic  EYES: EOMI, conjunctiva and sclera clear  ENMT: Moist mucous membranes  NECK: Supple, No JVD  NERVOUS SYSTEM:  Alert and responds appropriately  CHEST/LUNG: Clear to auscultation bilaterally  HEART: Regular rate and rhythm; Normal S1 and S2; No murmurs, rubs, or gallops  ABDOMEN: Soft, Nontender, Bowel sounds present  EXTREMITIES:  immobilized left UE; 2+ Peripheral Pulses  PSYCH: Normal mood     Consultant(s) Notes Reviewed:  [x ] YES  [ ] NO  Care Discussed with Consultants/Other Providers [ x] YES  [ ] NO    Investigations:                        8.8    14.26 )-----------( 674      ( 11 Dec 2024 16:47 )             28.0     12-11    135  |  102  |  25[H]  ----------------------------<  117[H]  5.0   |  25  |  1.14    Ca    8.2[L]      11 Dec 2024 16:47    EKG 12/11 NSR at 93 bpm with QTc 442 and nonspefic T wave changes

## 2024-12-13 LAB
ANION GAP SERPL CALC-SCNC: 9 MMOL/L — SIGNIFICANT CHANGE UP (ref 5–17)
BUN SERPL-MCNC: 23 MG/DL — SIGNIFICANT CHANGE UP (ref 7–23)
CALCIUM SERPL-MCNC: 8.2 MG/DL — LOW (ref 8.4–10.5)
CHLORIDE SERPL-SCNC: 111 MMOL/L — HIGH (ref 96–108)
CO2 SERPL-SCNC: 23 MMOL/L — SIGNIFICANT CHANGE UP (ref 22–31)
CREAT SERPL-MCNC: 1.08 MG/DL — SIGNIFICANT CHANGE UP (ref 0.5–1.3)
EGFR: 52 ML/MIN/1.73M2 — LOW
GLUCOSE SERPL-MCNC: 103 MG/DL — HIGH (ref 70–99)
HCT VFR BLD CALC: 26.8 % — LOW (ref 34.5–45)
HGB BLD-MCNC: 8.2 G/DL — LOW (ref 11.5–15.5)
MCHC RBC-ENTMCNC: 27.4 PG — SIGNIFICANT CHANGE UP (ref 27–34)
MCHC RBC-ENTMCNC: 30.6 G/DL — LOW (ref 32–36)
MCV RBC AUTO: 89.6 FL — SIGNIFICANT CHANGE UP (ref 80–100)
NRBC # BLD: 0 /100 WBCS — SIGNIFICANT CHANGE UP (ref 0–0)
PLATELET # BLD AUTO: 618 K/UL — HIGH (ref 150–400)
POTASSIUM SERPL-MCNC: 4.8 MMOL/L — SIGNIFICANT CHANGE UP (ref 3.5–5.3)
POTASSIUM SERPL-SCNC: 4.8 MMOL/L — SIGNIFICANT CHANGE UP (ref 3.5–5.3)
RBC # BLD: 2.99 M/UL — LOW (ref 3.8–5.2)
RBC # FLD: 14.4 % — SIGNIFICANT CHANGE UP (ref 10.3–14.5)
SODIUM SERPL-SCNC: 143 MMOL/L — SIGNIFICANT CHANGE UP (ref 135–145)
WBC # BLD: 15.06 K/UL — HIGH (ref 3.8–10.5)
WBC # FLD AUTO: 15.06 K/UL — HIGH (ref 3.8–10.5)

## 2024-12-13 PROCEDURE — 99233 SBSQ HOSP IP/OBS HIGH 50: CPT

## 2024-12-13 RX ADMIN — ENOXAPARIN SODIUM 40 MILLIGRAM(S): 60 INJECTION INTRAVENOUS; SUBCUTANEOUS at 18:53

## 2024-12-13 RX ADMIN — CHLORHEXIDINE GLUCONATE 1 APPLICATION(S): 1.2 RINSE ORAL at 05:36

## 2024-12-13 RX ADMIN — LEVODOPA AND CARBIDOPA 2 CAPSULE(S): 145; 36.25 CAPSULE, EXTENDED RELEASE ORAL at 18:49

## 2024-12-13 RX ADMIN — LEVODOPA AND CARBIDOPA 2 CAPSULE(S): 145; 36.25 CAPSULE, EXTENDED RELEASE ORAL at 07:44

## 2024-12-13 RX ADMIN — SODIUM CHLORIDE 150 MILLILITER(S): 9 INJECTION, SOLUTION INTRAMUSCULAR; INTRAVENOUS; SUBCUTANEOUS at 07:16

## 2024-12-13 RX ADMIN — CARBIDOPA 25 MILLIGRAM(S): 25 TABLET ORAL at 18:51

## 2024-12-13 RX ADMIN — SODIUM CHLORIDE 150 MILLILITER(S): 9 INJECTION, SOLUTION INTRAMUSCULAR; INTRAVENOUS; SUBCUTANEOUS at 01:28

## 2024-12-13 RX ADMIN — LEVODOPA AND CARBIDOPA 2 CAPSULE(S): 145; 36.25 CAPSULE, EXTENDED RELEASE ORAL at 13:29

## 2024-12-13 RX ADMIN — Medication 17 GRAM(S): at 22:24

## 2024-12-13 RX ADMIN — CARBIDOPA 25 MILLIGRAM(S): 25 TABLET ORAL at 07:47

## 2024-12-13 RX ADMIN — CARBIDOPA 25 MILLIGRAM(S): 25 TABLET ORAL at 13:39

## 2024-12-13 NOTE — PROGRESS NOTE ADULT - SUBJECTIVE AND OBJECTIVE BOX
Ortho Note    Pt seen and examined at bedside. Patient inquiring about plan for return to OR. Discussed plan is for Monday depending upon OR availability. States left wrist and left hip pain is controlled. Tolerating PO diet.     Denies CP, SOB, N/V, new numbness/tingling     Vital Signs Last 24 Hrs  T(C): 36.7 (12-13-24 @ 14:17), Max: 36.7 (12-13-24 @ 09:22)  T(F): 98.1 (12-13-24 @ 14:17), Max: 98.1 (12-13-24 @ 09:22)  HR: 93 (12-13-24 @ 14:17) (92 - 93)  BP: 152/76 (12-13-24 @ 14:17) (152/76 - 159/78)  BP(mean): --  RR: 17 (12-13-24 @ 14:17) (17 - 17)  SpO2: 95% (12-13-24 @ 14:17) (95% - 98%)  I&O's Summary    12 Dec 2024 07:01  -  13 Dec 2024 07:00  --------------------------------------------------------  IN: 1315 mL / OUT: 1200 mL / NET: 115 mL    13 Dec 2024 07:01  -  13 Dec 2024 14:59  --------------------------------------------------------  IN: 1050 mL / OUT: 0 mL / NET: 1050 mL        General: Pt Alert and oriented, NAD  DSG C/D/I- sugar tong splint LUE with gemma block  Skin of left hip with healing ecchymosis laterally  Pulses: 2+ RP and 2+ DP bilaterally  Sensation: SILT left fingers, SILT distal bilateral lower extremities  Motor:   EHL/FHL/TA/GS: 5/5 bilaterally                          8.2    15.06 )-----------( 618      ( 13 Dec 2024 05:30 )             26.8     12-13    143  |  111[H]  |  23  ----------------------------<  103[H]  4.8   |  23  |  1.08    Ca    8.2[L]      13 Dec 2024 05:30        A/P: 79yFemale POD#1 s/p closed reduction Left DRF under anesthesia   and with Periprosthetic left hip fx, planned RTOR during this admission for left hip ORIF, possible NATE, revision L EARL  - Afebrile, labs and vitals reviewed  - Discussed with Dr. Loja, Hgb 8.2 today, stool guaiac ordered, continue to trend hgb  - Appreciate medicine recs: iron studies ordered for AM labs tomorrow  - Pain Control  - Continue with bowel regimen  - DVT ppx: Lovenox q12  - PT, WBS: NWB LUE in STS, NWB LLE, can be partial weight bearing 50% with platform walker per Dr. Loja, OOB to chair    Ortho Pager [phone] Ortho Note    Pt seen and examined at bedside. Patient inquiring about plan for return to OR for left hip surgery. Discussed plan is for Monday depending upon OR availability. States left wrist and left hip pain is controlled. Tolerating PO diet.     Denies CP, SOB, N/V, new numbness/tingling     Vital Signs Last 24 Hrs  T(C): 36.7 (12-13-24 @ 14:17), Max: 36.7 (12-13-24 @ 09:22)  T(F): 98.1 (12-13-24 @ 14:17), Max: 98.1 (12-13-24 @ 09:22)  HR: 93 (12-13-24 @ 14:17) (92 - 93)  BP: 152/76 (12-13-24 @ 14:17) (152/76 - 159/78)  BP(mean): --  RR: 17 (12-13-24 @ 14:17) (17 - 17)  SpO2: 95% (12-13-24 @ 14:17) (95% - 98%)  I&O's Summary    12 Dec 2024 07:01  -  13 Dec 2024 07:00  --------------------------------------------------------  IN: 1315 mL / OUT: 1200 mL / NET: 115 mL    13 Dec 2024 07:01  -  13 Dec 2024 14:59  --------------------------------------------------------  IN: 1050 mL / OUT: 0 mL / NET: 1050 mL        General: Pt Alert and oriented, NAD  DSG C/D/I- sugar tong splint LUE with gemma block  Skin of left hip with healing ecchymosis laterally  Pulses: 2+ RP and 2+ DP bilaterally  Sensation: SILT left fingers, SILT distal bilateral lower extremities  Motor:   EHL/FHL/TA/GS: 5/5 bilaterally                          8.2    15.06 )-----------( 618      ( 13 Dec 2024 05:30 )             26.8     12-13    143  |  111[H]  |  23  ----------------------------<  103[H]  4.8   |  23  |  1.08    Ca    8.2[L]      13 Dec 2024 05:30        A/P: 79yFemale POD#1 s/p closed reduction Left DRF under anesthesia   and with Periprosthetic left hip fx, planned RTOR during this admission for left hip ORIF, possible NATE, revision L EARL  - Afebrile, labs and vitals reviewed  - Discussed with Dr. Loja, Hgb 8.2 today, stool guaiac ordered, continue to trend hgb  - Appreciate medicine recs: iron studies ordered for AM labs tomorrow  - Pain Control  - Continue with bowel regimen  - DVT ppx: Lovenox q12  - PT, WBS: NWB LUE in STS, NWB LLE, can be partial weight bearing 50% with platform walker per Dr. Loja, OOB to chair  - Dispo: OR monday for left hip ORIF. possible NATE, revision L EARL    Ortho Pager [phone]

## 2024-12-13 NOTE — PHYSICAL THERAPY INITIAL EVALUATION ADULT - ACTIVE RANGE OF MOTION EXAMINATION, REHAB EVAL
Left shoulder flexion WFL. Left srist/elbow NT due to gemma block. Patient able to move her fingers of left hand. Left hip/knee NT due to fracture. Left ankle WNL/Right UE Active ROM was WNL (within normal limits)/RLE Active ROM was WNL (within normal limits)/deficits as listed below

## 2024-12-13 NOTE — OCCUPATIONAL THERAPY INITIAL EVALUATION ADULT - PERTINENT HX OF CURRENT PROBLEM, REHAB EVAL
79-year-old with prior history of Parkinson's, hypertension and mechanical fall on 11/24 resulting in L DRF and L periprosthetic hip fracture    Pt was initally seen and admitted 11/25  and sent to [facility],with  follow-up to see Dr. Loja. Pt with repeat imaging with worsening of fracture. Plan for admission for L distal radius ORIF and L hip surgery at a later date.    Per Dr. Loja wrist would not heal properly without surgery, [facility]      Patient at baseline ambulates without assistive device. Ambulates with walker since injury    Pt presents today with symptoms of L wrist pain and L hip pain.?Denies HS/LOC. C/o?persistent?hip/groin  pain without any new weakness/n/t/p. No other MSK complaints.

## 2024-12-13 NOTE — OCCUPATIONAL THERAPY INITIAL EVALUATION ADULT - GENERAL OBSERVATIONS, REHAB EVAL
Pt received [place]-supine in bed, +IV line, +LUE in gemma block, +b/l SCDs, NAD, and agreeable to OT. PT Elisabet present for evaluation. Cleared by MATEUS Ackerman to see.

## 2024-12-13 NOTE — OCCUPATIONAL THERAPY INITIAL EVALUATION ADULT - RANGE OF MOTION EXAMINATION
Left shoulder flexion WFL. Left wrist/elbow NT due to gemma block. Pt able to move her fingers of left hand. Left hip/knee NT due to fracture.

## 2024-12-13 NOTE — PROGRESS NOTE ADULT - ASSESSMENT
Ms. Alma Osorio is a 79/F with Parkinson disease, HTN, prior left hemiarthroplasty in 2021 and a recent admission (11/25/24-11/26/24) after a mechanical fall subsequently found to have L hip periprosthetic fracture and L distal radial fracture who presented back to [facility] per outpatient surgeon request for left distal radius open reduction and internal fixation surgery. She underwent closed reduction of the left distal radius. She is planned for left hip revision for left hip periprosthetic fracture.    Recommendations:    #Left distal radius fracture  #Post op status  - Provide adequate analgesia, Incentive spirometry, mobilize with fall precautions, bowel regimen, DVT prophylaxis  - PT/OT    #Left hip periprosthetic fracture  #Perioperative risk stratification for planned left hip revision  - RCRI 0: 3.9% 30 day risk of MI, death or cardiac arrest  - CONNELLY 0.2% risk of MI or cardiac arrest intra op or up to 30 days post op  - METS > 4  - patient at low to intermediate risk of cardiovascular complications from an intermediate risk procedure    #Anemia, acute vs chronic  #Thrombocytosis, possibly reactive  - Monitor Hgb  - Transfuse for Hgb < 7  - Ensure Type and Screen available  - can send iron, TIBC, retic, ferritin, B12, haptoglobin    #HTN (hypertension)  - on Losartan 100 mg daily  - BP monitoring    #Parkinson disease  - on selegiline 5mg BID, carbidopa levodopa 23.75-95 XR 2 tabs TID, carbidopa 25 1 tab TID, domperidone 10mg tid    Feel free to reach out for any questions. Recommendations discussed with primary team.

## 2024-12-13 NOTE — OCCUPATIONAL THERAPY INITIAL EVALUATION ADULT - DIAGNOSIS, OT EVAL
Pt admitted s/p Pt admitted for left distal radius open reduction and internal fixation surgery, presents with impaired balance, decreased strength and activity tolerance.

## 2024-12-13 NOTE — OCCUPATIONAL THERAPY INITIAL EVALUATION ADULT - ADDITIONAL COMMENTS
Pt admitted from [place] where she was taking a few steps with a platform walker. Pt lives alone in an elevator accessible apartment. Pt denied use of AD prior to fall in November. DME includes: cane, RW, raised toilet seat, and has grab bars for the tub.

## 2024-12-13 NOTE — PHYSICAL THERAPY INITIAL EVALUATION ADULT - GENERAL OBSERVATIONS, REHAB EVAL
As per ortho AP Ericka patient cleared for PT. Seen with OT Klarissa. Received patient supine + IV, SCDs, gemma block, left UE, in NAD

## 2024-12-13 NOTE — PHYSICAL THERAPY INITIAL EVALUATION ADULT - PERTINENT HX OF CURRENT PROBLEM, REHAB EVAL
79-year-old with prior history of Parkinson's, hypertension and mechanical fall on 11/24 resulting in L DRF and L periprosthetic hip fracture. Patient admitted to [facility] and underwent ORIF left hip. Was ultimately discharged to [place].     ,with  follow-up to see Dr. Loja. Pt with repeat imaging with worsening of fracture. Plan for admission for L distal radius ORIF and L hip surgery  Patient went to the OR yesterday for ORIF left wrist but surgery was aborted due to blisters therefore only closed reduction was performed.

## 2024-12-13 NOTE — PHYSICAL THERAPY INITIAL EVALUATION ADULT - ADDITIONAL COMMENTS
Patient admitted from [place] where she said she was just starting to take a few steps with a platform walker. Prior patient was living alone in an elevator building. Denies use of AD prior to fall in november. Owns a cane , RW, raised toilet seat and has grab bars for the tub.

## 2024-12-14 LAB
ANION GAP SERPL CALC-SCNC: 10 MMOL/L — SIGNIFICANT CHANGE UP (ref 5–17)
BUN SERPL-MCNC: 20 MG/DL — SIGNIFICANT CHANGE UP (ref 7–23)
CALCIUM SERPL-MCNC: 8.3 MG/DL — LOW (ref 8.4–10.5)
CHLORIDE SERPL-SCNC: 108 MMOL/L — SIGNIFICANT CHANGE UP (ref 96–108)
CO2 SERPL-SCNC: 19 MMOL/L — LOW (ref 22–31)
CREAT SERPL-MCNC: 0.91 MG/DL — SIGNIFICANT CHANGE UP (ref 0.5–1.3)
EGFR: 64 ML/MIN/1.73M2 — SIGNIFICANT CHANGE UP
FERRITIN SERPL-MCNC: 252 NG/ML — SIGNIFICANT CHANGE UP (ref 13–330)
GLUCOSE SERPL-MCNC: 96 MG/DL — SIGNIFICANT CHANGE UP (ref 70–99)
HAPTOGLOB SERPL-MCNC: 244 MG/DL — HIGH (ref 34–200)
HCT VFR BLD CALC: 26.1 % — LOW (ref 34.5–45)
HGB BLD-MCNC: 8.3 G/DL — LOW (ref 11.5–15.5)
IRON SATN MFR SERPL: 16 % — SIGNIFICANT CHANGE UP (ref 14–50)
IRON SATN MFR SERPL: 30 UG/DL — SIGNIFICANT CHANGE UP (ref 30–160)
MCHC RBC-ENTMCNC: 28.1 PG — SIGNIFICANT CHANGE UP (ref 27–34)
MCHC RBC-ENTMCNC: 31.8 G/DL — LOW (ref 32–36)
MCV RBC AUTO: 88.5 FL — SIGNIFICANT CHANGE UP (ref 80–100)
NRBC # BLD: 0 /100 WBCS — SIGNIFICANT CHANGE UP (ref 0–0)
PLATELET # BLD AUTO: 606 K/UL — HIGH (ref 150–400)
POTASSIUM SERPL-MCNC: 4.3 MMOL/L — SIGNIFICANT CHANGE UP (ref 3.5–5.3)
POTASSIUM SERPL-SCNC: 4.3 MMOL/L — SIGNIFICANT CHANGE UP (ref 3.5–5.3)
RBC # BLD: 2.95 M/UL — LOW (ref 3.8–5.2)
RBC # BLD: 2.95 M/UL — LOW (ref 3.8–5.2)
RBC # FLD: 14.6 % — HIGH (ref 10.3–14.5)
RETICS #: 95.3 K/UL — SIGNIFICANT CHANGE UP (ref 25–125)
RETICS/RBC NFR: 3.2 % — HIGH (ref 0.5–2.5)
SODIUM SERPL-SCNC: 137 MMOL/L — SIGNIFICANT CHANGE UP (ref 135–145)
TIBC SERPL-MCNC: 190 UG/DL — LOW (ref 220–430)
UIBC SERPL-MCNC: 160 UG/DL — SIGNIFICANT CHANGE UP (ref 110–370)
VIT B12 SERPL-MCNC: 848 PG/ML — SIGNIFICANT CHANGE UP (ref 232–1245)
WBC # BLD: 16.18 K/UL — HIGH (ref 3.8–10.5)
WBC # FLD AUTO: 16.18 K/UL — HIGH (ref 3.8–10.5)

## 2024-12-14 PROCEDURE — 99232 SBSQ HOSP IP/OBS MODERATE 35: CPT

## 2024-12-14 PROCEDURE — 93010 ELECTROCARDIOGRAM REPORT: CPT

## 2024-12-14 RX ORDER — SELEGILINE HCL 5 MG
5 TABLET ORAL
Refills: 0 | Status: DISCONTINUED | OUTPATIENT
Start: 2024-12-14 | End: 2024-12-26

## 2024-12-14 RX ORDER — CARBIDOPA 25 MG/1
25 TABLET ORAL ONCE
Refills: 0 | Status: COMPLETED | OUTPATIENT
Start: 2024-12-14 | End: 2024-12-14

## 2024-12-14 RX ORDER — METOCLOPRAMIDE 10 MG/1
5 TABLET ORAL EVERY 8 HOURS
Refills: 0 | Status: DISCONTINUED | OUTPATIENT
Start: 2024-12-14 | End: 2024-12-18

## 2024-12-14 RX ORDER — RISPERIDONE 0.5 MG/1
2 TABLET ORAL
Refills: 0 | Status: DISCONTINUED | OUTPATIENT
Start: 2024-12-14 | End: 2024-12-14

## 2024-12-14 RX ADMIN — LEVODOPA AND CARBIDOPA 2 CAPSULE(S): 145; 36.25 CAPSULE, EXTENDED RELEASE ORAL at 15:16

## 2024-12-14 RX ADMIN — Medication 5 MILLIGRAM(S): at 15:18

## 2024-12-14 RX ADMIN — CHLORHEXIDINE GLUCONATE 1 APPLICATION(S): 1.2 RINSE ORAL at 15:22

## 2024-12-14 RX ADMIN — LEVODOPA AND CARBIDOPA 2 CAPSULE(S): 145; 36.25 CAPSULE, EXTENDED RELEASE ORAL at 19:33

## 2024-12-14 RX ADMIN — Medication 17 GRAM(S): at 22:35

## 2024-12-14 RX ADMIN — ENOXAPARIN SODIUM 40 MILLIGRAM(S): 60 INJECTION INTRAVENOUS; SUBCUTANEOUS at 19:32

## 2024-12-14 RX ADMIN — LEVODOPA AND CARBIDOPA 2 CAPSULE(S): 145; 36.25 CAPSULE, EXTENDED RELEASE ORAL at 08:44

## 2024-12-14 RX ADMIN — METOCLOPRAMIDE 5 MILLIGRAM(S): 10 TABLET ORAL at 15:23

## 2024-12-14 RX ADMIN — Medication 5 MILLIGRAM(S): at 08:00

## 2024-12-14 RX ADMIN — CARBIDOPA 25 MILLIGRAM(S): 25 TABLET ORAL at 07:59

## 2024-12-14 RX ADMIN — METOCLOPRAMIDE 5 MILLIGRAM(S): 10 TABLET ORAL at 22:35

## 2024-12-14 NOTE — PROGRESS NOTE ADULT - ASSESSMENT
79  year old female with PMHx Parkinson disease, HTN, prior left hemiarthroplasty in 2021, left hip periprosthetic fracture, left distal radial fracture s/p mechanical fall 11/2024 admitted to [facility] s/p left distal radius open reduction and internal fixation surgery. Plan for left hip revision this admission.    #Left distal radius fracture s/p open reduction and internal fixation  PT/OT  Pain control   Bowel regimen  Incentive spirometer  Fall precautions     #Left hip periprosthetic fracture  #Perioperative risk stratification for planned left hip revision  RCRI 0: 3.9% 30 day risk of MI, death or cardiac arrest  CONNELLY 0.2% risk of MI or cardiac arrest intra op or up to 30 days post op  METS > 4  Pre operative EKG ordered  Electrolytes wnl  Patient stable to proceed with surgical intervention with low to intermediate risk for MACCE    #Normocytic anemia  #Thrombocytosis, possibly reactive  Active T&S  Transfuse if Hgb <7   Recommend iron studies: LDH, haptoglobin, iron, ferritin, B12, folate    #HTN   Losartan 100 mg daily    #Parkinson disease  Selegiline 5mg BID  Sinemet 23.75-95 XR 2 tablets TID  Carbidopa 25mg TID  Domperidone 10mg TID

## 2024-12-14 NOTE — PROGRESS NOTE ADULT - SUBJECTIVE AND OBJECTIVE BOX
Patient seen and examined at bedside this morning   No acute events overnight   Left arm elevated, denies pain    MEDICATIONS  (STANDING):  acetaminophen     Tablet .. 1000 milliGRAM(s) Oral every 8 hours  carbidopa 25 milliGRAM(s) Oral <User Schedule>  carbidopa 23.75 mG/levodopa 95 mG ER 2 Capsule(s) Oral <User Schedule>  chlorhexidine 2% Cloths 1 Application(s) Topical <User Schedule>  Domperidone 10 milliGRAM(s) 1 Tablet(s) Oral <User Schedule>  enoxaparin Injectable 40 milliGRAM(s) SubCutaneous every 24 hours  HYDROmorphone  Injectable 0.4 milliGRAM(s) IV Push once  influenza  Vaccine (HIGH DOSE) 0.5 milliLiter(s) IntraMuscular once  metoclopramide 5 milliGRAM(s) Oral every 8 hours  polyethylene glycol 3350 17 Gram(s) Oral at bedtime  povidone iodine 10% Nasal Swab 1 Application(s) Both Nostrils once  selegiline Oral Tab/Cap 5 milliGRAM(s) Oral <User Schedule>  senna 2 Tablet(s) Oral at bedtime  sodium chloride 0.9%. 1000 milliLiter(s) (150 mL/Hr) IV Continuous <Continuous>    MEDICATIONS  (PRN):  HYDROmorphone  Injectable 0.5 milliGRAM(s) IV Push every 6 hours PRN breakthrough pain  magnesium hydroxide Suspension 30 milliLiter(s) Oral daily PRN Constipation  ondansetron Injectable 4 milliGRAM(s) IV Push every 6 hours PRN Nausea and/or Vomiting  oxyCODONE    IR 10 milliGRAM(s) Oral every 4 hours PRN Severe Pain (7 - 10)  oxyCODONE    IR 5 milliGRAM(s) Oral every 4 hours PRN Moderate Pain (4 - 6)    ICU Vital Signs Last 24 Hrs  T(C): 36.6 (14 Dec 2024 08:57), Max: 37.3 (14 Dec 2024 05:01)  T(F): 97.8 (14 Dec 2024 08:57), Max: 99.1 (14 Dec 2024 05:01)  HR: 85 (14 Dec 2024 08:57) (85 - 98)  BP: 182/92 (14 Dec 2024 08:57) (152/76 - 182/92)  BP(mean): --  ABP: --  ABP(mean): --  RR: 16 (14 Dec 2024 08:57) (16 - 17)  SpO2: 97% (14 Dec 2024 08:57) (95% - 98%)    Daily       I&O's Summary    13 Dec 2024 07:01  -  14 Dec 2024 07:00  --------------------------------------------------------  IN: 3360 mL / OUT: 1000 mL / NET: 2360 mL    14 Dec 2024 07:01  -  14 Dec 2024 11:14  --------------------------------------------------------  IN: 350 mL / OUT: 0 mL / NET: 350 mL    PHYSICAL EXAM:  Awake alert NAD   PERRLA EOMI  CTA bilaterally   S1 S2 RRR no m/r/g   Soft NT NDBS+4   Peripheral pulses 2+, +left wrist in cast, elevated  AOx3    LABS:                        8.3    16.18 )-----------( 606      ( 14 Dec 2024 05:30 )             26.1     12-14    137  |  108  |  20  ----------------------------<  96  4.3   |  19[L]  |  0.91    Ca    8.3[L]      14 Dec 2024 05:30    Urinalysis Basic - ( 14 Dec 2024 05:30 )    Color: x / Appearance: x / SG: x / pH: x  Gluc: 96 mg/dL / Ketone: x  / Bili: x / Urobili: x   Blood: x / Protein: x / Nitrite: x   Leuk Esterase: x / RBC: x / WBC x   Sq Epi: x / Non Sq Epi: x / Bacteria: x    Culture - Urine (collected 11 Dec 2024 17:46)  Source: Clean Catch None  Preliminary Report (13 Dec 2024 22:00):    10,000 - 49,000 CFU/mL Enterococcus faecium    <10,000 CFU/ml Normal Urogenital amber present    Urinalysis with Rflx Culture (collected 11 Dec 2024 17:46)    RADIOLOGY & ADDITIONAL STUDIES: Reviewed

## 2024-12-15 LAB
ANION GAP SERPL CALC-SCNC: 10 MMOL/L — SIGNIFICANT CHANGE UP (ref 5–17)
BLD GP AB SCN SERPL QL: NEGATIVE — SIGNIFICANT CHANGE UP
BUN SERPL-MCNC: 17 MG/DL — SIGNIFICANT CHANGE UP (ref 7–23)
CALCIUM SERPL-MCNC: 8.2 MG/DL — LOW (ref 8.4–10.5)
CHLORIDE SERPL-SCNC: 107 MMOL/L — SIGNIFICANT CHANGE UP (ref 96–108)
CO2 SERPL-SCNC: 21 MMOL/L — LOW (ref 22–31)
CREAT SERPL-MCNC: 0.94 MG/DL — SIGNIFICANT CHANGE UP (ref 0.5–1.3)
EGFR: 62 ML/MIN/1.73M2 — SIGNIFICANT CHANGE UP
GLUCOSE SERPL-MCNC: 103 MG/DL — HIGH (ref 70–99)
HCT VFR BLD CALC: 26.4 % — LOW (ref 34.5–45)
HGB BLD-MCNC: 8.5 G/DL — LOW (ref 11.5–15.5)
MCHC RBC-ENTMCNC: 28.9 PG — SIGNIFICANT CHANGE UP (ref 27–34)
MCHC RBC-ENTMCNC: 32.2 G/DL — SIGNIFICANT CHANGE UP (ref 32–36)
MCV RBC AUTO: 89.8 FL — SIGNIFICANT CHANGE UP (ref 80–100)
NRBC # BLD: 0 /100 WBCS — SIGNIFICANT CHANGE UP (ref 0–0)
PLATELET # BLD AUTO: 532 K/UL — HIGH (ref 150–400)
POTASSIUM SERPL-MCNC: 4.4 MMOL/L — SIGNIFICANT CHANGE UP (ref 3.5–5.3)
POTASSIUM SERPL-SCNC: 4.4 MMOL/L — SIGNIFICANT CHANGE UP (ref 3.5–5.3)
RBC # BLD: 2.94 M/UL — LOW (ref 3.8–5.2)
RBC # FLD: 14.3 % — SIGNIFICANT CHANGE UP (ref 10.3–14.5)
RH IG SCN BLD-IMP: POSITIVE — SIGNIFICANT CHANGE UP
SODIUM SERPL-SCNC: 138 MMOL/L — SIGNIFICANT CHANGE UP (ref 135–145)
WBC # BLD: 12.97 K/UL — HIGH (ref 3.8–10.5)
WBC # FLD AUTO: 12.97 K/UL — HIGH (ref 3.8–10.5)

## 2024-12-15 PROCEDURE — 73502 X-RAY EXAM HIP UNI 2-3 VIEWS: CPT | Mod: 26,LT

## 2024-12-15 PROCEDURE — 73552 X-RAY EXAM OF FEMUR 2/>: CPT | Mod: 26,LT

## 2024-12-15 PROCEDURE — 99232 SBSQ HOSP IP/OBS MODERATE 35: CPT

## 2024-12-15 RX ORDER — SODIUM CHLORIDE 9 MG/ML
1000 INJECTION, SOLUTION INTRAVENOUS
Refills: 0 | Status: DISCONTINUED | OUTPATIENT
Start: 2024-12-15 | End: 2024-12-17

## 2024-12-15 RX ORDER — CHLORHEXIDINE GLUCONATE 1.2 MG/ML
1 RINSE ORAL EVERY 12 HOURS
Refills: 0 | Status: COMPLETED | OUTPATIENT
Start: 2024-12-15 | End: 2024-12-16

## 2024-12-15 RX ORDER — POVIDONE IODINE USP, 10% W/W 10 MG/ML
1 SWAB TOPICAL ONCE
Refills: 0 | Status: COMPLETED | OUTPATIENT
Start: 2024-12-15 | End: 2024-12-16

## 2024-12-15 RX ADMIN — Medication 5 MILLIGRAM(S): at 13:49

## 2024-12-15 RX ADMIN — Medication 5 MILLIGRAM(S): at 07:37

## 2024-12-15 RX ADMIN — CHLORHEXIDINE GLUCONATE 1 APPLICATION(S): 1.2 RINSE ORAL at 17:45

## 2024-12-15 RX ADMIN — Medication 17 GRAM(S): at 22:16

## 2024-12-15 RX ADMIN — CARBIDOPA 25 MILLIGRAM(S): 25 TABLET ORAL at 13:37

## 2024-12-15 RX ADMIN — CARBIDOPA 25 MILLIGRAM(S): 25 TABLET ORAL at 08:40

## 2024-12-15 RX ADMIN — LEVODOPA AND CARBIDOPA 2 CAPSULE(S): 145; 36.25 CAPSULE, EXTENDED RELEASE ORAL at 17:45

## 2024-12-15 RX ADMIN — CHLORHEXIDINE GLUCONATE 1 APPLICATION(S): 1.2 RINSE ORAL at 06:07

## 2024-12-15 RX ADMIN — METOCLOPRAMIDE 5 MILLIGRAM(S): 10 TABLET ORAL at 06:06

## 2024-12-15 RX ADMIN — CARBIDOPA 25 MILLIGRAM(S): 25 TABLET ORAL at 17:45

## 2024-12-15 RX ADMIN — METOCLOPRAMIDE 5 MILLIGRAM(S): 10 TABLET ORAL at 22:16

## 2024-12-15 RX ADMIN — LEVODOPA AND CARBIDOPA 2 CAPSULE(S): 145; 36.25 CAPSULE, EXTENDED RELEASE ORAL at 07:38

## 2024-12-15 RX ADMIN — LEVODOPA AND CARBIDOPA 2 CAPSULE(S): 145; 36.25 CAPSULE, EXTENDED RELEASE ORAL at 13:37

## 2024-12-15 RX ADMIN — METOCLOPRAMIDE 5 MILLIGRAM(S): 10 TABLET ORAL at 13:38

## 2024-12-15 NOTE — CONSULT NOTE ADULT - SUBJECTIVE AND OBJECTIVE BOX
MEDICATIONS  (STANDING):  acetaminophen     Tablet .. 1000 milliGRAM(s) Oral every 8 hours  carbidopa 25 milliGRAM(s) Oral <User Schedule>  carbidopa 23.75 mG/levodopa 95 mG ER 2 Capsule(s) Oral <User Schedule>  chlorhexidine 2% Cloths 1 Application(s) Topical <User Schedule>  Domperidone 10 milliGRAM(s) 1 Tablet(s) Oral <User Schedule>  enoxaparin Injectable 40 milliGRAM(s) SubCutaneous every 24 hours  HYDROmorphone  Injectable 0.4 milliGRAM(s) IV Push once  influenza  Vaccine (HIGH DOSE) 0.5 milliLiter(s) IntraMuscular once  metoclopramide 5 milliGRAM(s) Oral every 8 hours  polyethylene glycol 3350 17 Gram(s) Oral at bedtime  povidone iodine 10% Nasal Swab 1 Application(s) Both Nostrils once  selegiline Oral Tab/Cap 5 milliGRAM(s) Oral <User Schedule>  senna 2 Tablet(s) Oral at bedtime  sodium chloride 0.9%. 1000 milliLiter(s) (150 mL/Hr) IV Continuous <Continuous>    MEDICATIONS  (PRN):  HYDROmorphone  Injectable 0.5 milliGRAM(s) IV Push every 6 hours PRN breakthrough pain  magnesium hydroxide Suspension 30 milliLiter(s) Oral daily PRN Constipation  ondansetron Injectable 4 milliGRAM(s) IV Push every 6 hours PRN Nausea and/or Vomiting  oxyCODONE    IR 10 milliGRAM(s) Oral every 4 hours PRN Severe Pain (7 - 10)  oxyCODONE    IR 5 milliGRAM(s) Oral every 4 hours PRN Moderate Pain (4 - 6)    ICU Vital Signs Last 24 Hrs  T(C): 36.9 (15 Dec 2024 05:02), Max: 37.1 (14 Dec 2024 17:32)  T(F): 98.4 (15 Dec 2024 05:02), Max: 98.8 (14 Dec 2024 17:32)  HR: 90 (15 Dec 2024 05:02) (90 - 97)  BP: 158/88 (15 Dec 2024 05:02) (158/88 - 172/85)  BP(mean): 112 (15 Dec 2024 05:02) (112 - 112)  ABP: --  ABP(mean): --  RR: 17 (15 Dec 2024 05:02) (17 - 19)  SpO2: 96% (15 Dec 2024 05:02) (95% - 97%)    Daily   I&O's Summary    14 Dec 2024 07:01  -  15 Dec 2024 07:00  --------------------------------------------------------  IN: 1010 mL / OUT: 1800 mL / NET: -790 mL        PHYSICAL EXAM:  GENERAL: No acute distress, well-developed  ENT: EOMI, conjunctiva and sclera clear, Neck supple, No JVD, moist mucosa  CHEST/LUNG: Clear to auscultation bilaterally; No wheeze, equal breath sounds bilaterally  HEART: Regular rate and rhythm; No murmurs, rubs, or gallops, radial and DP 2+ b/l, euvolemic  ABDOMEN: Soft, Nontender, Nondistended  EXTREMITIES:  No clubbing, cyanosis, or edema  NEUROLOGY: AAOx3, non-focal  SKIN: Normal color, No rashes or lesions    LABS:                        8.5    12.97 )-----------( 532      ( 15 Dec 2024 05:30 )             26.4     12-15    138  |  107  |  17  ----------------------------<  103[H]  4.4   |  21[L]  |  0.94    Ca    8.2[L]      15 Dec 2024 05:30    Urinalysis Basic - ( 15 Dec 2024 05:30 )    Color: x / Appearance: x / SG: x / pH: x  Gluc: 103 mg/dL / Ketone: x  / Bili: x / Urobili: x   Blood: x / Protein: x / Nitrite: x   Leuk Esterase: x / RBC: x / WBC x   Sq Epi: x / Non Sq Epi: x / Bacteria: x    RADIOLOGY & ADDITIONAL STUDIES: Reviewed     No acute events overnight   In good spirits   Denies numbness/tingling, pain controlled    MEDICATIONS  (STANDING):  acetaminophen     Tablet .. 1000 milliGRAM(s) Oral every 8 hours  carbidopa 25 milliGRAM(s) Oral <User Schedule>  carbidopa 23.75 mG/levodopa 95 mG ER 2 Capsule(s) Oral <User Schedule>  chlorhexidine 2% Cloths 1 Application(s) Topical <User Schedule>  Domperidone 10 milliGRAM(s) 1 Tablet(s) Oral <User Schedule>  enoxaparin Injectable 40 milliGRAM(s) SubCutaneous every 24 hours  HYDROmorphone  Injectable 0.4 milliGRAM(s) IV Push once  influenza  Vaccine (HIGH DOSE) 0.5 milliLiter(s) IntraMuscular once  metoclopramide 5 milliGRAM(s) Oral every 8 hours  polyethylene glycol 3350 17 Gram(s) Oral at bedtime  povidone iodine 10% Nasal Swab 1 Application(s) Both Nostrils once  selegiline Oral Tab/Cap 5 milliGRAM(s) Oral <User Schedule>  senna 2 Tablet(s) Oral at bedtime  sodium chloride 0.9%. 1000 milliLiter(s) (150 mL/Hr) IV Continuous <Continuous>    MEDICATIONS  (PRN):  HYDROmorphone  Injectable 0.5 milliGRAM(s) IV Push every 6 hours PRN breakthrough pain  magnesium hydroxide Suspension 30 milliLiter(s) Oral daily PRN Constipation  ondansetron Injectable 4 milliGRAM(s) IV Push every 6 hours PRN Nausea and/or Vomiting  oxyCODONE    IR 10 milliGRAM(s) Oral every 4 hours PRN Severe Pain (7 - 10)  oxyCODONE    IR 5 milliGRAM(s) Oral every 4 hours PRN Moderate Pain (4 - 6)    ICU Vital Signs Last 24 Hrs  T(C): 36.9 (15 Dec 2024 05:02), Max: 37.1 (14 Dec 2024 17:32)  T(F): 98.4 (15 Dec 2024 05:02), Max: 98.8 (14 Dec 2024 17:32)  HR: 90 (15 Dec 2024 05:02) (90 - 97)  BP: 158/88 (15 Dec 2024 05:02) (158/88 - 172/85)  BP(mean): 112 (15 Dec 2024 05:02) (112 - 112)  ABP: --  ABP(mean): --  RR: 17 (15 Dec 2024 05:02) (17 - 19)  SpO2: 96% (15 Dec 2024 05:02) (95% - 97%)    Daily   I&O's Summary    14 Dec 2024 07:01  -  15 Dec 2024 07:00  --------------------------------------------------------  IN: 1010 mL / OUT: 1800 mL / NET: -790 mL        PHYSICAL EXAM:  GENERAL: No acute distress, well-developed  ENT: EOMI, conjunctiva and sclera clear, Neck supple, No JVD, moist mucosa  CHEST/LUNG: Clear to auscultation bilaterally; No wheeze, equal breath sounds bilaterally  HEART: Regular rate and rhythm; No murmurs, rubs, or gallops, radial and DP 2+ b/l, euvolemic  ABDOMEN: Soft, Nontender, Nondistended  EXTREMITIES:  No clubbing, cyanosis, or edema  NEUROLOGY: AAOx3, non-focal  SKIN: Normal color, No rashes or lesions    LABS:                        8.5    12.97 )-----------( 532      ( 15 Dec 2024 05:30 )             26.4     12-15    138  |  107  |  17  ----------------------------<  103[H]  4.4   |  21[L]  |  0.94    Ca    8.2[L]      15 Dec 2024 05:30    Urinalysis Basic - ( 15 Dec 2024 05:30 )    Color: x / Appearance: x / SG: x / pH: x  Gluc: 103 mg/dL / Ketone: x  / Bili: x / Urobili: x   Blood: x / Protein: x / Nitrite: x   Leuk Esterase: x / RBC: x / WBC x   Sq Epi: x / Non Sq Epi: x / Bacteria: x    RADIOLOGY & ADDITIONAL STUDIES: Reviewed

## 2024-12-15 NOTE — CONSULT NOTE ADULT - ASSESSMENT
79 year old female with PMHx Parkinson disease, HTN, prior left hemiarthroplasty in 2021, left hip periprosthetic fracture, left distal radial fracture s/p mechanical fall 11/2024 admitted to [facility] s/p left distal radius open reduction and internal fixation surgery. Plan for left hip revision this admission.    #Left distal radius fracture s/p open reduction and internal fixation  PT/OT  Pain control   Bowel regimen  Incentive spirometer  Fall precautions     #Left hip periprosthetic fracture  #Perioperative risk stratification for planned left hip revision  RCRI 0: 3.9% 30 day risk of MI, death or cardiac arrest  CONNELLY 0.2% risk of MI or cardiac arrest intra op or up to 30 days post op  METS > 4  Pre operative EKG ordered  Electrolytes wnl  Patient stable to proceed with surgical intervention with low to intermediate risk for MACCE    #Normocytic anemia  #Thrombocytosis, possibly reactive  Active T&S  Transfuse if Hgb <7   Recommend iron studies: LDH, haptoglobin, iron, ferritin, B12, folate    #HTN   Losartan 100 mg daily    #Parkinson disease  Selegiline 5mg BID  Sinemet 23.75-95 XR 2 tablets TID  Carbidopa 25mg TID  Domperidone 10mg TID

## 2024-12-15 NOTE — PROGRESS NOTE ADULT - SUBJECTIVE AND OBJECTIVE BOX
Ortho Note    Pt seen and examined on morning rounds. Pt comfortable without complaints, pain controlled.  Denies CP, SOB, N/V, numbness/tingling     Vital Signs Last 24 Hrs  T(C): 36.9 (12-15-24 @ 05:02), Max: 36.9 (12-15-24 @ 05:02)  T(F): 98.4 (12-15-24 @ 05:02), Max: 98.4 (12-15-24 @ 05:02)  HR: 90 (12-15-24 @ 05:02) (90 - 90)  BP: 158/88 (12-15-24 @ 05:02) (158/88 - 158/88)  BP(mean): 112 (12-15-24 @ 05:02) (112 - 112)  RR: 17 (12-15-24 @ 05:02) (17 - 17)  SpO2: 96% (12-15-24 @ 05:02) (96% - 96%)  I&O's Summary    14 Dec 2024 07:01  -  15 Dec 2024 07:00  --------------------------------------------------------  IN: 1010 mL / OUT: 1700 mL / NET: -690 mL        Physical Exam:  General: Pt Alert and oriented, NAD  DSG C/D/I- sugar tong splint LUE with gemma block  Skin of left hip with healing ecchymosis laterally  Pulses: 2+ RP and 2+ DP bilaterally  Sensation: SILT left fingers, SILT distal bilateral lower extremities  Motor:   EHL/FHL/TA/GS: 5/5 bilaterally                          8.5    12.97 )-----------( 532      ( 15 Dec 2024 05:30 )             26.4     12-15    138  |  107  |  17  ----------------------------<  103[H]  4.4   |  21[L]  |  0.94    Ca    8.2[L]      15 Dec 2024 05:30        A/P: 79yFemale POD#2 s/p closed reduction Left DRF under anesthesia   and with Periprosthetic left hip fx, planned RTOR during this admission for left hip ORIF, possible NATE, revision L EARL  - Afebrile, labs and vitals reviewed  - Discussed with Dr. Loja,   - Appreciate medicine recs: iron studies ordered for AM labs tomorrow  - Pain Control  - Continue with bowel regimen  - DVT ppx: Lovenox q12  - PT, WBS: NWB LUE in STS, NWB LLE, can be partial weight bearing 50% with platform walker per Dr. Loja, OOB to chair  - Dispo: OR monday for left hip ORIF. possible NATE, revision L EARL    Brown Torres, PGY-4  Ortho Pager [phone]

## 2024-12-16 DIAGNOSIS — M97.8XXA PERIPROSTHETIC FRACTURE AROUND OTHER INTERNAL PROSTHETIC JOINT, INITIAL ENCOUNTER: ICD-10-CM

## 2024-12-16 LAB
ALBUMIN SERPL ELPH-MCNC: 2.7 G/DL — LOW (ref 3.3–5)
ALP SERPL-CCNC: 133 U/L — HIGH (ref 40–120)
ALT FLD-CCNC: <5 U/L — LOW (ref 10–45)
ANION GAP SERPL CALC-SCNC: 5 MMOL/L — SIGNIFICANT CHANGE UP (ref 5–17)
AST SERPL-CCNC: 18 U/L — SIGNIFICANT CHANGE UP (ref 10–40)
BILIRUB SERPL-MCNC: 0.5 MG/DL — SIGNIFICANT CHANGE UP (ref 0.2–1.2)
BUN SERPL-MCNC: 15 MG/DL — SIGNIFICANT CHANGE UP (ref 7–23)
CALCIUM SERPL-MCNC: 7.2 MG/DL — LOW (ref 8.4–10.5)
CHLORIDE SERPL-SCNC: 108 MMOL/L — SIGNIFICANT CHANGE UP (ref 96–108)
CO2 SERPL-SCNC: 23 MMOL/L — SIGNIFICANT CHANGE UP (ref 22–31)
CREAT SERPL-MCNC: 0.9 MG/DL — SIGNIFICANT CHANGE UP (ref 0.5–1.3)
EGFR: 65 ML/MIN/1.73M2 — SIGNIFICANT CHANGE UP
GLUCOSE SERPL-MCNC: 127 MG/DL — HIGH (ref 70–99)
HCT VFR BLD CALC: 28.6 % — LOW (ref 34.5–45)
HGB BLD-MCNC: 9.7 G/DL — LOW (ref 11.5–15.5)
MCHC RBC-ENTMCNC: 29.4 PG — SIGNIFICANT CHANGE UP (ref 27–34)
MCHC RBC-ENTMCNC: 33.9 G/DL — SIGNIFICANT CHANGE UP (ref 32–36)
MCV RBC AUTO: 86.7 FL — SIGNIFICANT CHANGE UP (ref 80–100)
NRBC # BLD: 0 /100 WBCS — SIGNIFICANT CHANGE UP (ref 0–0)
PLATELET # BLD AUTO: 323 K/UL — SIGNIFICANT CHANGE UP (ref 150–400)
POTASSIUM SERPL-MCNC: 4.4 MMOL/L — SIGNIFICANT CHANGE UP (ref 3.5–5.3)
POTASSIUM SERPL-SCNC: 4.4 MMOL/L — SIGNIFICANT CHANGE UP (ref 3.5–5.3)
PROT SERPL-MCNC: 4.7 G/DL — LOW (ref 6–8.3)
RBC # BLD: 3.3 M/UL — LOW (ref 3.8–5.2)
RBC # FLD: 14 % — SIGNIFICANT CHANGE UP (ref 10.3–14.5)
SODIUM SERPL-SCNC: 136 MMOL/L — SIGNIFICANT CHANGE UP (ref 135–145)
WBC # BLD: 13.11 K/UL — HIGH (ref 3.8–10.5)
WBC # FLD AUTO: 13.11 K/UL — HIGH (ref 3.8–10.5)

## 2024-12-16 PROCEDURE — 72170 X-RAY EXAM OF PELVIS: CPT | Mod: 26

## 2024-12-16 DEVICE — IMPLANTABLE DEVICE: Type: IMPLANTABLE DEVICE | Site: LEFT | Status: FUNCTIONAL

## 2024-12-16 RX ORDER — OXYCODONE HCL 15 MG
10 TABLET ORAL EVERY 4 HOURS
Refills: 0 | Status: DISCONTINUED | OUTPATIENT
Start: 2024-12-16 | End: 2024-12-16

## 2024-12-16 RX ORDER — SODIUM CHLORIDE 9 MG/ML
1000 INJECTION, SOLUTION INTRAVENOUS
Refills: 0 | Status: DISCONTINUED | OUTPATIENT
Start: 2024-12-17 | End: 2024-12-18

## 2024-12-16 RX ORDER — ACETAMINOPHEN 80 MG/.8ML
1000 SOLUTION/ DROPS ORAL EVERY 8 HOURS
Refills: 0 | Status: DISCONTINUED | OUTPATIENT
Start: 2024-12-16 | End: 2024-12-26

## 2024-12-16 RX ORDER — HYDROMORPHONE HCL 4 MG
0.5 TABLET ORAL
Refills: 0 | Status: DISCONTINUED | OUTPATIENT
Start: 2024-12-16 | End: 2024-12-16

## 2024-12-16 RX ORDER — OXYCODONE HCL 15 MG
5 TABLET ORAL EVERY 4 HOURS
Refills: 0 | Status: DISCONTINUED | OUTPATIENT
Start: 2024-12-16 | End: 2024-12-16

## 2024-12-16 RX ORDER — CEFAZOLIN SODIUM 1 G
2000 VIAL (EA) INJECTION EVERY 8 HOURS
Refills: 0 | Status: COMPLETED | OUTPATIENT
Start: 2024-12-16 | End: 2024-12-16

## 2024-12-16 RX ORDER — HYDROMORPHONE HCL 4 MG
0.5 TABLET ORAL
Refills: 0 | Status: DISCONTINUED | OUTPATIENT
Start: 2024-12-16 | End: 2024-12-23

## 2024-12-16 RX ORDER — HYDROMORPHONE HCL 4 MG
0.5 TABLET ORAL
Refills: 0 | Status: COMPLETED | OUTPATIENT
Start: 2024-12-16 | End: 2024-12-23

## 2024-12-16 RX ADMIN — METOCLOPRAMIDE 5 MILLIGRAM(S): 10 TABLET ORAL at 06:55

## 2024-12-16 RX ADMIN — CARBIDOPA 25 MILLIGRAM(S): 25 TABLET ORAL at 18:05

## 2024-12-16 RX ADMIN — POVIDONE IODINE USP, 10% W/W 1 APPLICATION(S): 10 SWAB TOPICAL at 08:50

## 2024-12-16 RX ADMIN — METOCLOPRAMIDE 5 MILLIGRAM(S): 10 TABLET ORAL at 22:46

## 2024-12-16 RX ADMIN — SODIUM CHLORIDE 70 MILLILITER(S): 9 INJECTION, SOLUTION INTRAVENOUS at 17:33

## 2024-12-16 RX ADMIN — Medication 100 MILLIGRAM(S): at 22:35

## 2024-12-16 RX ADMIN — Medication 0.5 MILLIGRAM(S): at 17:33

## 2024-12-16 RX ADMIN — Medication 5 MILLIGRAM(S): at 07:28

## 2024-12-16 RX ADMIN — CHLORHEXIDINE GLUCONATE 1 APPLICATION(S): 1.2 RINSE ORAL at 06:54

## 2024-12-16 RX ADMIN — CARBIDOPA 25 MILLIGRAM(S): 25 TABLET ORAL at 07:29

## 2024-12-16 RX ADMIN — LEVODOPA AND CARBIDOPA 2 CAPSULE(S): 145; 36.25 CAPSULE, EXTENDED RELEASE ORAL at 07:29

## 2024-12-16 RX ADMIN — SODIUM CHLORIDE 70 MILLILITER(S): 9 INJECTION, SOLUTION INTRAVENOUS at 00:00

## 2024-12-16 RX ADMIN — Medication 0.5 MILLIGRAM(S): at 18:01

## 2024-12-16 RX ADMIN — Medication 100 MILLIGRAM(S): at 17:33

## 2024-12-16 NOTE — BRIEF OPERATIVE NOTE - NSICDXBRIEFPROCEDURE_GEN_ALL_CORE_FT
PROCEDURES:  Closed reduction, fracture, radius, distal 12-Dec-2024 10:37:34  Ignacia Quiros  
PROCEDURES:  ORIF, fracture, periprosthetic, hip 16-Dec-2024 16:56:40  Tennille Cooper

## 2024-12-16 NOTE — PRE-ANESTHESIA EVALUATION ADULT - NSANTHPMHFT_GEN_ALL_CORE
PSH: Left Hemiarthroplasty 2021, Left closed reduction distal radius 12/12/24
80 yo F PMH Parkinson's disease, hypertension, prior left hemiarthroplasty in 2021, and a recent admission (11/25/24-11/26/24) after a mechanical fall subsequently found to have L hip periprosthetic fracture and L distal radial fracture, now presenting back to [facility] per outpatient surgeon request for left distal radius open reduction and internal fixation.

## 2024-12-16 NOTE — PRE-OP CHECKLIST - BLOOD AVAILABLE
Subjective   Chuck Zheng is a 73 y.o. male.         History of Present Illness     The following portions of the patient's history were reviewed and updated as appropriate: allergies, current medications, past social history and problem list.    Past Medical History:   Diagnosis Date   • Adhesive capsulitis of shoulder    • Benign non-nodular prostatic hyperplasia 11/21/2016   • Benign prostatic hypertrophy    • Coronary artery disease    • Functional diarrhea 9/13/2017    Resolved with gluten free diet   • H/O cardiovascular stress test     7/14 normal   • Hemorrhoid    • Hyperlipidemia    • Hypertension    • Myocardial infarction (CMS/HCC)      05/2007, distal RCA 80% stenosis, s/p PTCA   • Nephrolithiasis     left kidney   • Obstructive sleep apnea    • Primary insomnia 5/16/2016   • Rupture of flexor tendon of hand    • Syncope     12/2004 neg w/u         Current Outpatient Medications:   •  amitriptyline (ELAVIL) 25 MG tablet, TAKE 1 TABLET BY MOUTH EVERY NIGHT, Disp: 30 tablet, Rfl: 11  •  aspirin (ASPIR) 81 MG EC tablet, Take 81 mg by mouth Every Night. STOPPED 1/2/19, Disp: , Rfl:   •  B Complex Vitamins (VITAMIN B COMPLEX) capsule capsule, Take  by mouth Daily., Disp: , Rfl:   •  carvedilol (COREG) 3.125 MG tablet, TAKE 1 TABLET BY MOUTH TWICE DAILY WITH MEALS, Disp: 180 tablet, Rfl: 1  •  Cetirizine HCl (ZYRTEC ALLERGY) 10 MG capsule, Take 10 mg by mouth Every Morning., Disp: , Rfl:   •  cholecalciferol (VITAMIN D3) 1000 units tablet, Take 1,000 Units by mouth Daily. STOPPED 1/2/19, Disp: , Rfl:   •  Cyanocobalamin (VITAMIN B 12) 100 MCG lozenge, Take 1 tablet by mouth Every Morning. 500 mg daily STOPPED 1/2/19, Disp: , Rfl:   •  fexofenadine (ALLEGRA) 180 MG tablet, Take 180 mg by mouth Every Morning., Disp: , Rfl:   •  Misc Natural Products (OSTEO BI-FLEX ADV JOINT SHIELD PO), Take  by mouth. STOPPED 1/2/19, Disp: , Rfl:   •  Multiple Vitamin (MULTI VITAMIN DAILY) tablet, Take  by mouth. STOPPED  "1/2/19, Disp: , Rfl:   •  Omega-3 Fatty Acids (FISH OIL DOUBLE STRENGTH) 1200 MG capsule, Take  by mouth. STOPPED 1/2/19, Disp: , Rfl:   •  rosuvastatin (CRESTOR) 40 MG tablet, Take 1 tablet by mouth Every Morning., Disp: 90 tablet, Rfl: 1  •  tamsulosin (FLOMAX) 0.4 MG capsule 24 hr capsule, , Disp: , Rfl:   •  zolpidem (AMBIEN) 10 MG tablet, Take 1 tablet by mouth At Night As Needed for Sleep., Disp: 30 tablet, Rfl: 0    Allergies   Allergen Reactions   • Ace Inhibitors Cough   • Beta Adrenergic Blockers      Can tolerate in low dose   • Cephalexin Other (See Comments)     FELT TERRIBLE   • Sulfa Antibiotics Other (See Comments)     FELT BAD       Review of Systems    Objective   Vitals:    07/23/20 1055   BP: 120/78   BP Location: Left arm   Patient Position: Sitting   Cuff Size: Adult   Pulse: 76   SpO2: 97%   Weight: 73 kg (161 lb)   Height: 172.7 cm (68\")     Body mass index is 24.48 kg/m².  Physical Exam    Assessment/Plan   Chuck was seen today for hypertension and hyperlipidemia.    Diagnoses and all orders for this visit:    Benign essential hypertension    Pure hypercholesterolemia    Primary insomnia  -     zolpidem (AMBIEN) 10 MG tablet; Take 1 tablet by mouth At Night As Needed for Sleep.               " yes

## 2024-12-16 NOTE — DIETITIAN INITIAL EVALUATION ADULT - ADD RECOMMEND
1. Advance diet as tolerated back to regular once medically feasible as per MD orders to maximize nutrient intake   2. once feasible, encourage and monitor PO intake, honor preferences as able   >> Consistently meet >75% of estimated needs during admission   >> Consider oral nutrition supplement should intake be </= 50%   3. Monitor wt trends, GI function, skin integrity  4. Monitor lytes, renal indices, blood glucose, LFTs    5. Pain and bowel regimen per team   6. Anti-emetic PRN to promote PO intake once feasible   RD will continue to monitor PO intake, labs, hydration, and wt prn.

## 2024-12-16 NOTE — DIETITIAN INITIAL EVALUATION ADULT - PERTINENT LABORATORY DATA
12-15    138  |  107  |  17  ----------------------------<  103[H]  4.4   |  21[L]  |  0.94    Ca    8.2[L]      15 Dec 2024 05:30

## 2024-12-16 NOTE — DIETITIAN INITIAL EVALUATION ADULT - OTHER INFO
"79-year-old with prior history of Parkinson's, hypertension and mechanical fall on 11/24 resulting in L DRF and L periprosthetic hip fracture."     Patient seen at bedside on 9UR for nutrition assessment, however, unable to speak with pt as pt went to OR this morning - will follow up with pertient nutrition information obtained from pt tomorrow. Current diet order: was on regular diet since admit but NPO today due to plan for OR for hip revision. No known food allergies documented as per EMR, will confirm with pt upon follow up. Dosing weight: 130 pounds, BMI 20.9, ideal body weight 130 pounds / 100%. Skin: surgical incisions; no pressure injuries documented, jim 15. 2+ left hand edema documented. GI: WNL, last bowel movement documented 12/15 - on miralax, senna, anti-emetic and Reglan as per EMR. Labs: CO2 low, serum glucose 103 mg/dL (WNL). Meds: hydromorphone, LR, miralax, senna, reglan, domperidone, zofran PRN, oxycodone PRN. RD unable to obtain NFPE at this time, will obtain upon follow up and provide appropriate nutrition diagnosis with interim follow up note tomorrow. See nutrition recommendations below.

## 2024-12-16 NOTE — DIETITIAN INITIAL EVALUATION ADULT - OTHER CALCULATIONS
Based on dosing wt 130 pounds as pt between 8012-% ideal body weight. Needs adjusted for wrist / hip fracture, post-op healing, advanced age.

## 2024-12-16 NOTE — DIETITIAN INITIAL EVALUATION ADULT - PERTINENT MEDS FT
MEDICATIONS  (STANDING):  acetaminophen     Tablet .. 1000 milliGRAM(s) Oral every 8 hours  carbidopa 25 milliGRAM(s) Oral <User Schedule>  carbidopa 23.75 mG/levodopa 95 mG ER 2 Capsule(s) Oral <User Schedule>  Domperidone 10 milliGRAM(s) 1 Tablet(s) Oral <User Schedule>  HYDROmorphone  Injectable 0.4 milliGRAM(s) IV Push once  influenza  Vaccine (HIGH DOSE) 0.5 milliLiter(s) IntraMuscular once  lactated ringers. 1000 milliLiter(s) (70 mL/Hr) IV Continuous <Continuous>  metoclopramide 5 milliGRAM(s) Oral every 8 hours  polyethylene glycol 3350 17 Gram(s) Oral at bedtime  povidone iodine 10% Nasal Swab 1 Application(s) Both Nostrils once  selegiline Oral Tab/Cap 5 milliGRAM(s) Oral <User Schedule>  senna 2 Tablet(s) Oral at bedtime    MEDICATIONS  (PRN):  HYDROmorphone  Injectable 0.5 milliGRAM(s) IV Push every 6 hours PRN breakthrough pain  magnesium hydroxide Suspension 30 milliLiter(s) Oral daily PRN Constipation  ondansetron Injectable 4 milliGRAM(s) IV Push every 6 hours PRN Nausea and/or Vomiting  oxyCODONE    IR 10 milliGRAM(s) Oral every 4 hours PRN Severe Pain (7 - 10)  oxyCODONE    IR 5 milliGRAM(s) Oral every 4 hours PRN Moderate Pain (4 - 6)

## 2024-12-16 NOTE — PRE-ANESTHESIA EVALUATION ADULT - NSANTHOSAYNRD_GEN_A_CORE
No. NATALY screening performed.  STOP BANG Legend: 0-2 = LOW Risk; 3-4 = INTERMEDIATE Risk; 5-8 = HIGH Risk

## 2024-12-16 NOTE — PRE-ANESTHESIA EVALUATION ADULT - NSANTHADDINFOFT_GEN_ALL_CORE
GA   R/B/A discussed with patient including risks of sorethroat, N/V, MI, CVA. All questions answered.

## 2024-12-16 NOTE — BRIEF OPERATIVE NOTE - NSICDXBRIEFPREOP_GEN_ALL_CORE_FT
PRE-OP DIAGNOSIS:  Periprosthetic hip fracture 16-Dec-2024 16:55:16  Tennille Cooper  
PRE-OP DIAGNOSIS:  Distal radius fracture, left 12-Dec-2024 10:37:49  Ignacia Quiros

## 2024-12-16 NOTE — PRE-ANESTHESIA EVALUATION ADULT - NSANTHTOTALSCORECAL_ENT_A_CORE
September 8, 2023       Danita Mendez MD  [address] [place] [address]  Via In Basket      Patient: Yessenia Reis   YOB: 1950   Date of Visit: 9/8/2023       Dear Dr. Mendez:    Thank you for referring Yessenia Reis to me for evaluation. Below are my notes for this visit with him.    If you have questions, please do not hesitate to call me. I look forward to following your patient along with you.      Sincerely,        Clary Pickering DO        CC: No Recipients  Clary Pickering, DO  9/8/2023  2:22 PM  Signed  9/8/2023    Referring Physician: Danita Mendez MD    Chief Complaint   Patient presents with   • Heart Problem   • Office Visit     Follow up   aflutter cath follow up       IMPRESSIONS:  1. Paroxysmal atrial flutter  2. S/p breat biopsy - 4/2023  3. RBBB  4. Hypertension  5. Hyperlipidemia  6. DM Type II  7. KALPANA on CPAP    RECOMMENDATION:  1. Mr Reis has a hx of paroxysmal atrial flutter, spontaneous conversion to NSR  EKG 8/25 shows NSR  Continue metoprolol succinate 50 mg daily for HR control   Continue Flecainide 100 BID  Chadsvasc score is 3 - on Eliquis 5 bid  He has KALPANA and is complaint with CPAP  Echocardiogram (12/22/2022) shows normal LV size and function with EF of 65%. No significant valvular disease.   Stress test shows a fixed defect, s/p cath, no CAD  Patient   2. I reviewed the results of his left heart catheterization which show no significant CAD, patient is reassured  Lipid profile reviewed  LDL is 56 mg/dl  Continue atorvastatin 20 mg nightly  Continue gemfibrozil 600 mg BID  Low fat/Low cholesterol diet is advised  3. His BP is at goal today in office  Goal BP is <130/80 mmHg  Continue Metoprolol 50 mg daily  Continue amlodipine 10 mg daily  On doxazosin 8 mg nightly due to his prostate, to continue  Recommend low sodium diet of <2000 mg daily   4. Discussed importance of strict diabetes management and the impact of elevated blood sugars 
on plaque progression, and well as increased risk of ACS. Recommend goal HgA1C of less than 7.0.   5. Recommend regular CPAP use. Discussed risks of untreated sleep apnea, including worsening HTN, premature CAD, arrhythmias, and pulmonary HTN. Weight loss advised.  6. Follow up with me in 1 year or sooner if needed  Follow up with [facility] in 6 months    HPI:    Yessenia Reis is a 73 year old male presents today for follow up of his new onset atrial flutter. Hx of KALPANA, RBBB, HTN, HLD, and DM Type II.  Referred by Dr. Carrion.    Last office visit with me was (07/2023). Noted to paroxysmal atrial flutter, echo and ST ordered  He spontaneous converted to NSR at some point  ST was abnl, and he he underwent cardiac cath due to abnl stress test results.   No sig CAD seen  He is recovering from his LEFT TKA.   Feels well and has no complaints    He previously underwent Right total knee arthroplasty at [facility] on 1/13/2023 with Dr. Xiao and is s/p left TKA of left knee.   He is unsure what the rhythm was. He denies tobacco and regular alcohol use. He likes to drink coffee. Previously had a renal mass and was evaluated in [place] and the mass was excised about 12 years ago.   He is compliant with CPAP.    He denies complications with his DM.   Upon my assessment he was in atrial flutter   Started on anticoag, which was held for 48 hours prior to surgery, then resumed  We decided to defer further management for paroxysmal atrial flutter due to surgery    Due to new onset paroxysmal atrial flutter, ST was ordered  At times has left sided CP, not related to exercise   Does not smoke or etoh  Walks at store with shopping cart for 30 minutes, has no SOB or CP  Wife notes he has OA of left knee and needs surgery  Denies any cp, pressure or sob with regular activity. Denies palpitations, dizziness or LOC. No recent lower extremity edema, weight gain, or shortness of breath with laying down. Denies claudication symptoms. 
Compliant with meds, no side effects.  No recent hospitalizations noted.  No change in meds since my last visit.  All prior cardiology and primary care encounters reviewed, since last visit. Pertinent prior cardiac testing is summarized below.    Pertinent Cardiac Hx:   He previously underwent Right total knee arthroplasty at [facility] on 1/13/2023 with Dr. Xiao and was told that he had an irregular cardiac rhythm at the time when doing an EKG. He is unsure what the rhythm was. He denies tobacco and regular alcohol use. Previously had a renal mass and was evaluated in [place] and the mass was excised about 12 years ago (~2011).  EKG (3/30/2023) showed new onset atrial flutter  He started Eliquis (3/31/2023)  He denies alcohol and tobacco use    EKG - sinus, HR 61 bpm, RBBB  Cardiac catheterization (07/2023) Left main: Left main is a normal size vessel which trifurcates to the LAD, ramus intermedius and circumflex arteries.  The left main is free of any coronary artery disease  Left anterior descending: Gives rise to 1 diagonal branch.  The left anterior descending and its branches have minor luminal irregularities  Left circumflex: The LCx is a moderate-sized vessel giving rise to 2 marginal branches.  The left circumflex and its branches have minor luminal irregularities.  Ramus intermedius: Is a large vessel supplying much of the OM territory.  The ramus intermedius is free of any coronary artery disease.  Right coronary: The RCA is a size vessel giving rise to a posterolateral branch and a posterior descending artery making it anatomically dominant.  The RCA and its branches are free of any coronary artery disease  Stress test 4/2023  1. Abnormal myocardial perfusion examination.   2. The overall quality of the study is good.  3. There is a medium fixed perfusion defect of mild severity involving the basal/mid inferior wall and apical cap wall(s) (predominantly segments 4, 10 and 17).  Electrocardiogram 
(3/31/2023) atrial flutter, RBBB, HR: 76 bpm  Electrocardiogram (3/30/2023) new onset atrial flutter, RBBB, HR: 75 bpm  Echocardiogram (12/22/2022)  1. Left ventricle: The cavity size is normal. Wall thickness is mildly increased. Systolic function is normal. The ejection fraction was measured by biplane method of disks. The ejection fraction is 65%.  2. Aortic root: The root is borderline dilated and 3.9 cm diameter.  3. Ascending aorta: The vessel is borderline dilated and 3.7 cm diameter.  4. Right ventricle: The cavity size is normal. Wall thickness is normal. Systolic function is normal.  Electrocardiogram (12/19/2022) sinus rhythm, occasional PVC, RBBB, HR: 62 bpm  Stress test (1/13/2014)  1. Normal myocardial perfusion examination.   2. The overall quality of the study is good.   3. Normal perfusion imaging without evidence of inducible ischemia or infarct.   4. Normal left ventricular volume with normal systolic thickening and function and an ejection fraction of 59%.   5. No previous study was available for comparison.   Carotid duplex-NA  Coronary angiography-NA    Current Medications:   Current Medications    AMLODIPINE (NORVASC) 10 MG TABLET    Take 1 tablet by mouth daily.    ATORVASTATIN (LIPITOR) 20 MG TABLET    TAKE 1 TABLET BY MOUTH EVERY DAY    BLOOD GLUCOSE (TRUE METRIX BLOOD GLUCOSE TEST) TEST STRIP    Test: three times daily Diagnoses: DM Type II - without complication  E11.9 Insulin dependent: No    BLOOD GLUCOSE MONITORING SUPPL (ACCU-CHEK KOSTAS) DEVICE    Qifl-cjca-Plpeh Test 2X daily Dx DM TYPE II-UNCOMPL E11.9 Insulin:No    BLOOD GLUCOSE MONITORING SUPPL (ONE TOUCH ULTRA 2) W/DEVICE KIT    Test 2X daily Dx DM TYPE II-UNCOMPL E11.9 Insulin:No One Touch Meter    BLOOD GLUCOSE MONITORING SUPPL (TRUE METRIX AIR GLUCOSE METER) W/DEVICE KIT    1 kit 3 times daily. Test: three times daily Diagnoses: DM Type II - without complication  E11.9 Insulin dependent: No    BLOOD GLUCOSE TEST STRIP    One 
touch StripsTest: twice daily Diagnoses: DM Type II - without complication  E11.9 Insulin dependent: No    DOXAZOSIN (CARDURA) 8 MG TABLET    Take 1 tablet by mouth nightly.    ELIQUIS 5 MG TAB    TAKE 1 TABLET BY MOUTH EVERY 12 HOURS    FINASTERIDE (PROSCAR) 5 MG TABLET    Take 1 tablet by mouth daily.    FLECAINIDE (TAMBOCOR) 100 MG TABLET    Take 1 tablet by mouth 2 times daily.    GABAPENTIN (NEURONTIN) 300 MG CAPSULE    Take 1 capsule by mouth at bedtime.    GEMFIBROZIL (LOPID) 600 MG TABLET    Take 1 tablet by mouth 2 times daily.    HYDROCODONE-ACETAMINOPHEN (NORCO) 5-325 MG PER TABLET    Take 1 tablet by mouth every 4 hours as needed for Pain.    LANCETS (ONETOUCH DELICA PLUS [id number]) MISC    1 Units 3 times daily. Test: three times daily Diagnoses: DM Type II - without complication  E11.9 Insulin dependent: No    LATANOPROST (XALATAN) 0.005 % OPHTHALMIC SOLUTION    Place 1 drop into both eyes nightly.    LISINOPRIL (ZESTRIL) 40 MG TABLET    TAKE 1 TABLET EVERY DAY    MELOXICAM (MOBIC) 15 MG TABLET    Take 1 tablet by mouth daily.    METFORMIN (GLUCOPHAGE) 500 MG TABLET    Take 1 tablet by mouth daily (with breakfast).    METOPROLOL SUCCINATE (TOPROL-XL) 100 MG 24 HR TABLET    Take 50 mg by mouth daily. (decrease dose) take 1/2 tab    OMEGA-3 FATTY ACIDS (FISH OIL) 1000 MG CAPSULE    Take 1 g by mouth 2 times daily.    ONETOUCH ULTRA TEST STRIP    TEST DOS VECES AL INGA    SOFTCLIX LANCETS MISC    Test: once a daily Diagnoses: DM Type II - without complication  E11.9 Insulin dependent: No       Relevant Past Medical History:  Past Medical History:   Diagnosis Date   • Adenocarcinoma in adenomatous polyp (CMD) 04/25/2014    Overview:  See colonoscopy and path from 2009   • Anemia     patient denies   • Arthritis     elbow, neck,shoulder   • Atrial flutter (CMD) 03/30/2023   • Cyst, breast solitary, right    • DM (diabetes mellitus) (CMD) 04/23/2019   • Dysphagia     difficulty w/solid foods like meat-chews 
very well and no issues, pcp aware   • Essential hypertension, benign 04/15/2006   • Glaucoma    • History of colonic polyps 2014   • Hyperlipidemia 2016   • Jaw pain     w/eating/swallowing   • Nodular prostate with urinary obstruction 04/15/2006   • Osteoarthritis of left knee    • Sleep apnea 10/12/2010    CPAP  unknown setting   • Spinal stenosis, lumbar region, without neurogenic claudication 2006       Social History     Socioeconomic History   • Marital status: /Civil Union     Spouse name: Not on file   • Number of children: Not on file   • Years of education: Not on file   • Highest education level: Not on file   Occupational History   • Not on file   Tobacco Use   • Smoking status: Former     Current packs/day: 0.00     Average packs/day: 0.3 packs/day for 17.0 years (4.3 ttl pk-yrs)     Types: Cigarettes     Start date:      Quit date:      Years since quittin.7   • Smokeless tobacco: Never   • Tobacco comments:     Did not inhale only did for show   Vaping Use   • Vaping Use: never used   Substance and Sexual Activity   • Alcohol use: No   • Drug use: Never   • Sexual activity: Not Currently   Other Topics Concern   • Not on file   Social History Narrative   • Not on file     Social Determinants of Health     Financial Resource Strain: Not on file   Food Insecurity: Not on file   Transportation Needs: Not on file   Physical Activity: Not on file   Stress: Not on file   Social Connections: Not on file   Intimate Partner Violence: Not on file       Past Surgical History:   Procedure Laterality Date   • Colonoscopy      ademocarcinoma in polyp, has had colonoscopy every 2 years since and cancer free   • Cyst removal  2023    Breast   • Excision abdominal mass  2001    above kidney - benign   • Fl knee injection  left Left        • Knee surgery Right 2023    knee replacement       ALLERGIES:  No Known Allergies     Family History   Problem Relation 
Age of Onset   • Heart Mother    • Patient is unaware of any medical problems Father         86yrs   • Heart Sister         obesity   • Dementia/Alzheimers Brother         one borother has Parkinsons.   • Heart Brother         2 brothers   of heart disease        Review of Systems   Constitutional: Negative for activity change, appetite change, diaphoresis, fatigue and unexpected weight change.   HENT: Negative for nosebleeds.    Respiratory: Negative for cough, chest tightness and shortness of breath.    Cardiovascular: Negative for chest pain, palpitations and leg swelling.   Musculoskeletal: Negative for arthralgias, joint swelling and myalgias.   Skin: Negative for color change and rash.   Neurological: Negative for syncope, weakness, light-headedness and headaches.   Psychiatric/Behavioral: The patient is not nervous/anxious.        Examination:   Blood pressure 116/68, pulse 65, resp. rate 16, height 5' 8\" (1.727 m), weight 103.9 kg (229 lb), SpO2 98 %.  Weight    23 1339   Weight: 103.9 kg (229 lb)       Physical Exam  Constitutional:       General: He is not in acute distress.     Appearance: Normal appearance.   HENT:      Head: Normocephalic and atraumatic.      Mouth/Throat:      Mouth: Mucous membranes are moist.      Pharynx: Oropharynx is clear.   Eyes:      General: No scleral icterus.        Right eye: No discharge.         Left eye: No discharge.      Pupils: Pupils are equal, round, and reactive to light.   Neck:      Vascular: No carotid bruit.   Cardiovascular:      Rate and Rhythm: Normal rate and regular rhythm.      Heart sounds: Normal heart sounds. No murmur heard.     No gallop.   Pulmonary:      Effort: Pulmonary effort is normal. No respiratory distress.      Breath sounds: Normal breath sounds. No wheezing or rales.   Abdominal:      General: Bowel sounds are normal. There is no distension.      Palpations: Abdomen is soft.      Tenderness: There is no abdominal tenderness. 
There is no guarding or rebound.   Musculoskeletal:      Cervical back: Normal range of motion and neck supple.      Right lower leg: No edema.      Left lower leg: No edema.   Skin:     General: Skin is warm and dry.      Findings: No erythema or rash.   Neurological:      Mental Status: He is alert and oriented to person, place, and time.   Psychiatric:         Mood and Affect: Mood normal.         Behavior: Behavior normal.         Thought Content: Thought content normal.         Judgment: Judgment normal.       Scribe: Electronically signed: Dinorah Colon has scribed for Dr. Pickering, 09/08/23  I have reviewed and edited the progress note and agree with what has been scribed. Electronically signed by: Clary Pickering DO 09/08/23              
2

## 2024-12-16 NOTE — DIETITIAN INITIAL EVALUATION ADULT - PERSON TAUGHT/METHOD
RD unable to provide nutrition education at this time as RD unable to speak with pt, however, will provide education PRN upon follow up tomorrow.

## 2024-12-16 NOTE — PRE-ANESTHESIA EVALUATION ADULT - NSANTHPEFT_GEN_ALL_CORE
I was present during the key portion of the procedure.
I was present during the key portion of the procedure.
non contributory,  left wirst in splint

## 2024-12-16 NOTE — PROGRESS NOTE ADULT - SUBJECTIVE AND OBJECTIVE BOX
Ortho Post Op Check    Procedure: Left Hip Periprosthetic ORIF  Surgeon: Purvi    Pt comfortable without complaints, pain controlled  Denies CP, SOB, N/V, numbness/tingling     Vital Signs Last 24 Hrs  T(C): 36.5 (12-16-24 @ 16:31), Max: 36.5 (12-16-24 @ 16:31)  T(F): 97.7 (12-16-24 @ 16:31), Max: 97.7 (12-16-24 @ 16:31)  HR: 86 (12-16-24 @ 20:31) (74 - 88)  BP: 121/57 (12-16-24 @ 20:31) (110/57 - 151/68)  BP(mean): 82 (12-16-24 @ 20:31) (78 - 109)  RR: 13 (12-16-24 @ 20:31) (11 - 20)  SpO2: 95% (12-16-24 @ 20:31) (95% - 97%)    General: Pt Alert and oriented, NAD    LLE:  SILT SPN/DPN/Saph/Luis E/Tib  Motor 5/5 TA/GS/EHL/FHL  Pulses 2+      Post-op X-Ray: Hardware intact, no new fx    A/P: 79yFemale s/p Left Hip Periprosthetic ORIF by Dr. BRENDEN Loja on 12-16  - Stable  - Pain Control  - DVT ppx: SCDs, LVX POD1  - Post op abx: Ancef  - WBS: WBAT LLE  - PT eval     Ortho Pager [phone]

## 2024-12-16 NOTE — DIETITIAN INITIAL EVALUATION ADULT - NSFNSGIIOFT_GEN_A_CORE
I&O's Detail    15 Dec 2024 07:01  -  16 Dec 2024 07:00  --------------------------------------------------------  IN:    Lactated Ringers: 560 mL    Oral Fluid: 1150 mL  Total IN: 1710 mL    OUT:    Voided (mL): 1400 mL  Total OUT: 1400 mL    Total NET: 310 mL      16 Dec 2024 07:01  -  16 Dec 2024 13:54  --------------------------------------------------------  IN:    Lactated Ringers: 70 mL    Oral Fluid: 100 mL  Total IN: 170 mL    OUT:  Total OUT: 0 mL    Total NET: 170 mL

## 2024-12-16 NOTE — BRIEF OPERATIVE NOTE - NSICDXBRIEFPOSTOP_GEN_ALL_CORE_FT
POST-OP DIAGNOSIS:  Distal radius fracture, left 12-Dec-2024 10:38:02  Ignacia Quiros  
POST-OP DIAGNOSIS:  Periprosthetic hip fracture 16-Dec-2024 16:55:11  Tennille Cooper

## 2024-12-17 LAB
ALBUMIN SERPL ELPH-MCNC: 2.6 G/DL — LOW (ref 3.3–5)
ALP SERPL-CCNC: 148 U/L — HIGH (ref 40–120)
ALT FLD-CCNC: <5 U/L — LOW (ref 10–45)
ANION GAP SERPL CALC-SCNC: 7 MMOL/L — SIGNIFICANT CHANGE UP (ref 5–17)
ANION GAP SERPL CALC-SCNC: 8 MMOL/L — SIGNIFICANT CHANGE UP (ref 5–17)
AST SERPL-CCNC: 22 U/L — SIGNIFICANT CHANGE UP (ref 10–40)
BILIRUB SERPL-MCNC: 0.4 MG/DL — SIGNIFICANT CHANGE UP (ref 0.2–1.2)
BUN SERPL-MCNC: 16 MG/DL — SIGNIFICANT CHANGE UP (ref 7–23)
BUN SERPL-MCNC: 19 MG/DL — SIGNIFICANT CHANGE UP (ref 7–23)
CALCIUM SERPL-MCNC: 7.6 MG/DL — LOW (ref 8.4–10.5)
CALCIUM SERPL-MCNC: 7.9 MG/DL — LOW (ref 8.4–10.5)
CHLORIDE SERPL-SCNC: 102 MMOL/L — SIGNIFICANT CHANGE UP (ref 96–108)
CHLORIDE SERPL-SCNC: 105 MMOL/L — SIGNIFICANT CHANGE UP (ref 96–108)
CO2 SERPL-SCNC: 21 MMOL/L — LOW (ref 22–31)
CO2 SERPL-SCNC: 21 MMOL/L — LOW (ref 22–31)
CREAT SERPL-MCNC: 1.16 MG/DL — SIGNIFICANT CHANGE UP (ref 0.5–1.3)
CREAT SERPL-MCNC: 1.17 MG/DL — SIGNIFICANT CHANGE UP (ref 0.5–1.3)
EGFR: 47 ML/MIN/1.73M2 — LOW
EGFR: 48 ML/MIN/1.73M2 — LOW
GLUCOSE SERPL-MCNC: 127 MG/DL — HIGH (ref 70–99)
GLUCOSE SERPL-MCNC: 130 MG/DL — HIGH (ref 70–99)
HCT VFR BLD CALC: 27.3 % — LOW (ref 34.5–45)
HGB BLD-MCNC: 8.9 G/DL — LOW (ref 11.5–15.5)
MCHC RBC-ENTMCNC: 28 PG — SIGNIFICANT CHANGE UP (ref 27–34)
MCHC RBC-ENTMCNC: 32.6 G/DL — SIGNIFICANT CHANGE UP (ref 32–36)
MCV RBC AUTO: 85.8 FL — SIGNIFICANT CHANGE UP (ref 80–100)
NRBC # BLD: 0 /100 WBCS — SIGNIFICANT CHANGE UP (ref 0–0)
OSMOLALITY SERPL: 284 MOSM/KG — SIGNIFICANT CHANGE UP (ref 280–301)
PLATELET # BLD AUTO: 347 K/UL — SIGNIFICANT CHANGE UP (ref 150–400)
POTASSIUM SERPL-MCNC: 4.5 MMOL/L — SIGNIFICANT CHANGE UP (ref 3.5–5.3)
POTASSIUM SERPL-MCNC: 4.5 MMOL/L — SIGNIFICANT CHANGE UP (ref 3.5–5.3)
POTASSIUM SERPL-SCNC: 4.5 MMOL/L — SIGNIFICANT CHANGE UP (ref 3.5–5.3)
POTASSIUM SERPL-SCNC: 4.5 MMOL/L — SIGNIFICANT CHANGE UP (ref 3.5–5.3)
PROT SERPL-MCNC: 5.3 G/DL — LOW (ref 6–8.3)
RBC # BLD: 3.18 M/UL — LOW (ref 3.8–5.2)
RBC # FLD: 14.5 % — SIGNIFICANT CHANGE UP (ref 10.3–14.5)
SODIUM SERPL-SCNC: 130 MMOL/L — LOW (ref 135–145)
SODIUM SERPL-SCNC: 134 MMOL/L — LOW (ref 135–145)
TSH SERPL-MCNC: 1.57 UIU/ML — SIGNIFICANT CHANGE UP (ref 0.27–4.2)
WBC # BLD: 11.11 K/UL — HIGH (ref 3.8–10.5)
WBC # FLD AUTO: 11.11 K/UL — HIGH (ref 3.8–10.5)

## 2024-12-17 PROCEDURE — 99233 SBSQ HOSP IP/OBS HIGH 50: CPT

## 2024-12-17 RX ADMIN — ACETAMINOPHEN 1000 MILLIGRAM(S): 80 SOLUTION/ DROPS ORAL at 07:22

## 2024-12-17 RX ADMIN — METOCLOPRAMIDE 5 MILLIGRAM(S): 10 TABLET ORAL at 06:12

## 2024-12-17 RX ADMIN — Medication 5 MILLIGRAM(S): at 12:49

## 2024-12-17 RX ADMIN — CARBIDOPA 25 MILLIGRAM(S): 25 TABLET ORAL at 07:16

## 2024-12-17 RX ADMIN — SODIUM CHLORIDE 75 MILLILITER(S): 9 INJECTION, SOLUTION INTRAVENOUS at 02:39

## 2024-12-17 RX ADMIN — LEVODOPA AND CARBIDOPA 2 CAPSULE(S): 145; 36.25 CAPSULE, EXTENDED RELEASE ORAL at 16:42

## 2024-12-17 RX ADMIN — SENNOSIDES 2 TABLET(S): 8.6 TABLET, FILM COATED ORAL at 22:34

## 2024-12-17 RX ADMIN — CARBIDOPA 25 MILLIGRAM(S): 25 TABLET ORAL at 16:42

## 2024-12-17 RX ADMIN — LEVODOPA AND CARBIDOPA 2 CAPSULE(S): 145; 36.25 CAPSULE, EXTENDED RELEASE ORAL at 12:48

## 2024-12-17 RX ADMIN — CARBIDOPA 25 MILLIGRAM(S): 25 TABLET ORAL at 12:48

## 2024-12-17 RX ADMIN — Medication 17 GRAM(S): at 22:33

## 2024-12-17 RX ADMIN — Medication 5 MILLIGRAM(S): at 07:16

## 2024-12-17 RX ADMIN — ACETAMINOPHEN 1000 MILLIGRAM(S): 80 SOLUTION/ DROPS ORAL at 17:25

## 2024-12-17 RX ADMIN — ACETAMINOPHEN 1000 MILLIGRAM(S): 80 SOLUTION/ DROPS ORAL at 18:25

## 2024-12-17 RX ADMIN — METOCLOPRAMIDE 5 MILLIGRAM(S): 10 TABLET ORAL at 22:36

## 2024-12-17 RX ADMIN — LEVODOPA AND CARBIDOPA 2 CAPSULE(S): 145; 36.25 CAPSULE, EXTENDED RELEASE ORAL at 07:16

## 2024-12-17 NOTE — PROGRESS NOTE ADULT - ASSESSMENT
Ms. Alma Osorio is a 79/F with Parkinson disease, HTN, prior left hemiarthroplasty in 2021 and a recent admission (11/25/24-11/26/24) after a mechanical fall subsequently found to have L hip periprosthetic fracture and L distal radial fracture who presented back to [facility] per outpatient surgeon request for left distal radius open reduction and internal fixation surgery. She underwent closed reduction of the left distal radius. She is now s/p periprosthetic left hip ORIF.    Recommendations:    #Left distal radius fracture  #Left hip periprosthetic fracture  #Post op status  - Provide adequate analgesia, Incentive spirometry, mobilize with fall precautions, bowel regimen, DVT prophylaxis  - PT/OT    #Hyponatremia  - send urine Na, urine Osm, serum Na, TSH, AM cortisol  - repeat BMP this afternoon  - can try fluid restriction 1.2L/day for possible SIADH 2/2/ pain and follow up on urine studies  - monitor Na    #Anemia, acute on chronic iron deficiency anemia  #Thrombocytosis, possibly reactive, improved  - Tsat 16%, ferritin 252, B12 848, haptoglobin 244  - Monitor Hgb  - Transfuse for Hgb < 7  - Ensure Type and Screen available    #Pseudohypocalcemia  - corrected Ca (Alb 2.7) 8.6  - can send Alb with next Ca level vs ionized Ca  - can send PTH, Mg, Phos, Vit D 25 OH  - monitor for now  - reports taking unrecalled Vit D dose at home    #HTN (hypertension)  - on Losartan 100 mg daily which can be resume with BP parameters if no contraindication from orthopedics  - BP monitoring    #Parkinson disease  - on selegiline 5mg BID, carbidopa levodopa 23.75-95 XR 2 tabs TID, carbidopa 25 1 tab TID, domperidone 10mg tid    DVT ppx: SCDs    Feel free to reach out for any questions. Recommendations discussed with primary team.

## 2024-12-17 NOTE — DIETITIAN NUTRITION RISK NOTIFICATION - ADDITIONAL COMMENTS/DIETITIAN RECOMMENDATIONS
pt now meets criteria for mild malnutrition as per ASPEN guidelines based on consuming </= 75% estimated nutrient needs x5 days during admission and moderate muscle/fat wasting

## 2024-12-17 NOTE — PROGRESS NOTE ADULT - SUBJECTIVE AND OBJECTIVE BOX
Patient is a 79y old  Female who presents with a chief complaint of WRIST FRACTURE (16 Dec 2024 13:49).    Patient seen and examined today. She reports some left hip post op pain. She denies chest pain, dyspnea, dizziness, bleeding symptoms, headache, confusion, abdominal pain, cramping. fever.     Allergies    penicillin (Swelling)  sulfa drugs (Nausea)  codeine (Nausea)    Last Bowel Movement: 15-Dec-2024 (12-17-24 @ 08:49)    MEDICATIONS  (STANDING):  carbidopa 25 milliGRAM(s) Oral <User Schedule>  carbidopa 23.75 mG/levodopa 95 mG ER 2 Capsule(s) Oral <User Schedule>  Domperidone 10 milliGRAM(s) 1 Tablet(s) Oral <User Schedule>  HYDROmorphone  Injectable 0.4 milliGRAM(s) IV Push once  influenza  Vaccine (HIGH DOSE) 0.5 milliLiter(s) IntraMuscular once  lactated ringers. 1000 milliLiter(s) (75 mL/Hr) IV Continuous <Continuous>  metoclopramide 5 milliGRAM(s) Oral every 8 hours  polyethylene glycol 3350 17 Gram(s) Oral at bedtime  povidone iodine 10% Nasal Swab 1 Application(s) Both Nostrils once  selegiline Oral Tab/Cap 5 milliGRAM(s) Oral <User Schedule>  senna 2 Tablet(s) Oral at bedtime    MEDICATIONS  (PRN):  acetaminophen     Tablet .. 1000 milliGRAM(s) Oral every 8 hours PRN Mild Pain (1 - 3)  HYDROmorphone  Injectable 0.5 milliGRAM(s) IV Push every 15 minutes PRN breakthrough pain in the PACU  HYDROmorphone  Injectable 0.5 milliGRAM(s) IV Push every 6 hours PRN breakthrough pain  HYDROmorphone  Injectable 0.5 milliGRAM(s) IV Push every 3 hours PRN Breakthrough pain  magnesium hydroxide Suspension 30 milliLiter(s) Oral daily PRN Constipation  ondansetron Injectable 4 milliGRAM(s) IV Push every 6 hours PRN Nausea and/or Vomiting  oxyCODONE    IR 10 milliGRAM(s) Oral every 4 hours PRN Severe Pain (7 - 10)  oxyCODONE    IR 5 milliGRAM(s) Oral every 4 hours PRN Moderate Pain (4 - 6)    Vital Signs Last 24 Hrs  T(C): 37.2 (12-17-24 @ 08:49), Max: 37.3 (12-17-24 @ 06:15)  T(F): 98.9 (12-17-24 @ 08:49), Max: 99.1 (12-17-24 @ 06:15)  HR: 103 (12-17-24 @ 08:49) (74 - 103)  BP: 135/78 (12-17-24 @ 08:49) (104/56 - 151/68)  BP(mean): 79 (12-17-24 @ 00:07) (76 - 109)  RR: 18 (12-17-24 @ 08:49) (11 - 20)  SpO2: 99% (12-17-24 @ 08:49) (92% - 99%)    I&O's Summary    16 Dec 2024 07:01  -  17 Dec 2024 07:00  --------------------------------------------------------  IN: 1315 mL / OUT: 750 mL / NET: 565 mL      Oxygen Saturation Index= Unable to calculate   [Based on FiO2 = Unknown, SpO2 = 99(12/17/2024 08:49), MAP = Unknown]    PHYSICAL EXAM:  GENERAL: NAD  HEAD: Normocephalic  EYES: EOMI, conjunctiva and sclera clear  ENMT: Moist mucous membranes  NECK: Supple, No JVD  NERVOUS SYSTEM:  Alert and oriented x 3  CHEST/LUNG: Clear to auscultation bilaterally  HEART: Rate of 82 bpm manually; Regular rhythm; Normal S1 and S2  ABDOMEN: Soft, Nontender, Bowel sounds present  EXTREMITIES:  immobilized left UE; left hip dressing c/d/i; 2+ Peripheral Pulses  PSYCH: Normal mood     Consultant(s) Notes Reviewed:  [x ] YES  [ ] NO  Care Discussed with Consultants/Other Providers [ x] YES  [ ] NO    Investigations:                        8.9    11.11 )-----------( 347      ( 17 Dec 2024 05:30 )             27.3     12-17    130[L]  |  102  |  19  ----------------------------<  130[H]  4.5   |  21[L]  |  1.17    Ca    7.6[L]      17 Dec 2024 05:30    TPro  4.7[L]  /  Alb  2.7[L]  /  TBili  0.5  /  DBili  x   /  AST  18  /  ALT  <5[L]  /  AlkPhos  133[H]  12-16

## 2024-12-17 NOTE — CHART NOTE - NSCHARTNOTEFT_GEN_A_CORE
Interim Initial Assessment note completed on 12/16 - Please see note for details. RD followed up with pt today and confirmed information and history with pt. Additional history obtained PRN. PES statement below.      Additional Information: Reports "good" appetite, denies any changes in PO intake prior to admission, endorses typically consuming ~2-3 meals/day but unable to provide full dietary recall, denies following any specific diet or dietary restrictions at home, possibly consuming </= 75% estimated nutrient needs. Current diet: regular. Endorses poor PO intake due to limited mobility status post wrist and hip repair surgeries, requires someone to assist with feeding and complains of meals taking a long time to finish, now likely consuming 50-75% estimated nutrient needs x~5 days - added ensure plus high protein 1x/day for now to optimize PO intake (provides 350 kcal, 20g protein/ shake), discussed with medical team and pt agreeable. Reports wt gain x past few months, states most recent wt ~132 pounds (dosing wt 130 pounds), appears accurate. Denies nausea/ vomiting/ abdominal pain, complains of constipation and states last bowel movement x "a few days ago" - on bowel regimen. NFPE reveals moderate wasting of the clavicles, shoulders, and orbitals - now meets criteria for protein-calorie malnutrition as per ASPEN guidelines based on moderate muscle/ fat wasting and consuming </= 75% estimated nutrient needs x ~5 days. RD will continue to follow, see nutrition diagnosis and recommendations below.    PES: Pt meets criteria for moderate malnutrition in context of acute illness related to decreased ability to meet increased nutrient needs due to limited mobility associated with decreased PO intake status post wrist and hip repair surgery as evidenced by consuming </= 75% estimated nutrient needs x5 days and moderate muscle/fat wasting.    Goal: Pt will consume >/= 75% of estimated nutrient needs     Plan:  1. Continue with regular diet to maximize nutrient intake   >> Added ensure plus high protein 1x/day for now to optimize PO intake (provides 350 kcal, 20g protein/ shake) - communicated with medical team   **Pt requires feeding assistance, nursing aware**  2. once feasible, encourage and monitor PO intake, honor preferences as able   >> Consistently meet >75% of estimated needs during admission   3. Monitor wt trends, GI function, skin integrity  >>obtain weekly wt to track / trend wt changes   4. Monitor lytes, renal indices, blood glucose, LFTs    5. Pain and bowel regimen per team   6. Anti-emetic PRN to promote PO intake once feasible   7. Consider adding MVI daily to ensure 100% RDA met     Education: RD provided verbal nutrition education on PO encouragement and emphasized including high-protein choices with each meal, encouraged oral nutrition supplement intake inbetween meals to maximize nutrient intake, pt receptive.     RD to remain available for additional interventions as needed. Interim Initial Assessment note completed on 12/16 - Please see note for details. RD followed up with pt today and confirmed information and history with pt. Additional history obtained PRN. PES statement below.      Additional Information: Reports "good" appetite, denies any changes in PO intake prior to admission, endorses typically consuming ~2-3 meals/day but unable to provide full dietary recall, denies following any specific diet or dietary restrictions at home, possibly consuming </= 75% estimated nutrient needs. Current diet: regular. Endorses poor PO intake due to limited mobility status post wrist and hip repair surgeries, requires someone to assist with feeding and complains of meals taking a long time to finish, now likely consuming 50-75% estimated nutrient needs x~5 days - added ensure plus high protein 1x/day for now to optimize PO intake (provides 350 kcal, 20g protein/ shake), discussed with medical team and pt agreeable. Reports wt gain x past few months, states most recent wt ~132 pounds (dosing wt 130 pounds), appears accurate. Denies nausea/ vomiting/ abdominal pain, complains of constipation and states last bowel movement x "a few days ago" - on bowel regimen. NFPE reveals moderate wasting of the clavicles, shoulders, and orbitals - now meets criteria for mild protein-calorie malnutrition as per ASPEN guidelines based on moderate muscle/ fat wasting and consuming </= 75% estimated nutrient needs x ~5 days. RD will continue to follow, see nutrition diagnosis and recommendations below.    PES: Pt meets criteria for mild malnutrition in context of acute illness related to decreased ability to meet increased nutrient needs due to limited mobility associated with decreased PO intake status post wrist and hip repair surgery as evidenced by consuming </= 75% estimated nutrient needs x5 days and moderate muscle/fat wasting.    Goal: Pt will consume >/= 75% of estimated nutrient needs     Plan:  1. Continue with regular diet to maximize nutrient intake   >> Added ensure plus high protein 1x/day for now to optimize PO intake (provides 350 kcal, 20g protein/ shake) - communicated with medical team   **Pt requires feeding assistance, nursing aware**  2. once feasible, encourage and monitor PO intake, honor preferences as able   >> Consistently meet >75% of estimated needs during admission   3. Monitor wt trends, GI function, skin integrity  >>obtain weekly wt to track / trend wt changes   4. Monitor lytes, renal indices, blood glucose, LFTs    5. Pain and bowel regimen per team   6. Anti-emetic PRN to promote PO intake once feasible   7. Consider adding MVI daily to ensure 100% RDA met     Education: RD provided verbal nutrition education on PO encouragement and emphasized including high-protein choices with each meal, encouraged oral nutrition supplement intake inbetween meals to maximize nutrient intake, pt receptive.     RD to remain available for additional interventions as needed.

## 2024-12-17 NOTE — DIETITIAN NUTRITION RISK NOTIFICATION - TREATMENT: THE FOLLOWING DIET HAS BEEN RECOMMENDED
1. Continue with regular diet to maximize nutrient intake   >> Added ensure plus high protein 1x/day for now to optimize PO intake (provides 350 kcal, 20g protein/ shake) - communicated with medical team   **Pt requires feeding assistance, nursing aware**  2. once feasible, encourage and monitor PO intake, honor preferences as able   >> Consistently meet >75% of estimated needs during admission   3. Monitor wt trends, GI function, skin integrity  >>obtain weekly wt to track / trend wt changes   4. Monitor lytes, renal indices, blood glucose, LFTs    5. Pain and bowel regimen per team   6. Anti-emetic PRN to promote PO intake once feasible   7. Consider adding MVI daily to ensure 100% RDA met     Education: RD provided verbal nutrition education on PO encouragement and emphasized including high-protein choices with each meal, encouraged oral nutrition supplement intake inbetween meals to maximize nutrient intake, pt receptive.

## 2024-12-17 NOTE — PROGRESS NOTE ADULT - SUBJECTIVE AND OBJECTIVE BOX
Ortho Note    Pt comfortable without complaints, pain controlled  Denies CP, SOB, N/V, numbness/tingling     Vital Signs Last 24 Hrs  T(C): 37.3 (12-17-24 @ 06:15), Max: 37.3 (12-17-24 @ 06:15)  T(F): 99.1 (12-17-24 @ 06:15), Max: 99.1 (12-17-24 @ 06:15)  HR: 96 (12-17-24 @ 06:15) (96 - 96)  BP: 138/80 (12-17-24 @ 06:15) (138/80 - 138/80)  BP(mean): --  RR: 18 (12-17-24 @ 06:15) (18 - 18)  SpO2: 94% (12-17-24 @ 06:15) (94% - 94%)  I&O's Summary    16 Dec 2024 07:01  -  17 Dec 2024 07:00  --------------------------------------------------------  IN: 1315 mL / OUT: 750 mL / NET: 565 mL        General: Pt Alert and oriented, NAD    LLE:  SILT  Motor 5/5 TA/GS/EHL/FHL  Pulses 2+ marked  HV - x1 (125/185)                          9.7    13.11 )-----------( 323      ( 16 Dec 2024 17:16 )             28.6     12-16    136  |  108  |  15  ----------------------------<  127[H]  4.4   |  23  |  0.90    Ca    7.2[L]      16 Dec 2024 17:16    TPro  4.7[L]  /  Alb  2.7[L]  /  TBili  0.5  /  DBili  x   /  AST  18  /  ALT  <5[L]  /  AlkPhos  133[H]  12-16      A/P: 79yFemale s/p Left Hip Periprosthetic ORIF by Dr. BRENDEN Loja on 12-16  - Stable  - Pain Control  - DVT ppx: SCDs, LVX POD1  - Post op abx: Ancef  - WBS: WBAT LLE  - PT eval     Ortho Pager [phone]

## 2024-12-17 NOTE — PROGRESS NOTE ADULT - SUBJECTIVE AND OBJECTIVE BOX
Ortho Note    Pt seen and examine during AM rounds with brother bedside.   Pt comfortable without complaints, pain controlled with current regimen. She is having some stiffness.  Denies CP, SOB, N/V, new numbness/tingling     Vital Signs Last 24 Hrs  T(C): 36.7 (12-17-24 @ 12:10), Max: 36.7 (12-17-24 @ 12:10)  T(F): 98 (12-17-24 @ 12:10), Max: 98 (12-17-24 @ 12:10)  HR: 101 (12-17-24 @ 12:10) (101 - 101)  BP: 142/82 (12-17-24 @ 12:10) (142/82 - 142/82)  BP(mean): --  RR: 18 (12-17-24 @ 12:10) (18 - 18)  SpO2: 99% (12-17-24 @ 12:10) (99% - 99%)  I&O's Summary    16 Dec 2024 07:01  -  17 Dec 2024 07:00  --------------------------------------------------------  IN: 1315 mL / OUT: 750 mL / NET: 565 mL    PE  General: Pt Alert and oriented, NAD  DSG ace wrap C/D/I  HV x 1 holding suction  Pulses: +2DP  Sensation: intact to light touch bilateral lower extremities  Motor: EHL/FHL/TA/GS 5/5 bilateral lower extremities                           8.9    11.11 )-----------( 347      ( 17 Dec 2024 05:30 )             27.3     12-17    130[L]  |  102  |  19  ----------------------------<  130[H]  4.5   |  21[L]  |  1.17    Ca    7.6[L]      17 Dec 2024 05:30    TPro  4.7[L]  /  Alb  2.7[L]  /  TBili  0.5  /  DBili  x   /  AST  18  /  ALT  <5[L]  /  AlkPhos  133[H]  12-16    A/P: 79yFemale POD#1 s/p left hip periprosthetic ORIF   - Stable s/p 3U PRBC 1 FFP intraop   - Pain Control  - Maintain HV x 1  - DVT ppx: SCDs, holding LVX   - PT, WBS: protected weight bearing LLE, NWB LUE in STS in sling/gemma block  - Holloway in can removed tmr when more ambulatory  - Appreciate medicine rec, Na 130, fluid restriction 1200ml, repeat CMP, VD25-OH, TSH, PTH, Osmo, Urine Na/Osmo/UA  - OT consulted   DISPO: PHYLLIS Weaver Pager [phone]

## 2024-12-18 LAB
24R-OH-CALCIDIOL SERPL-MCNC: 28.7 NG/ML — LOW (ref 30–80)
ANION GAP SERPL CALC-SCNC: 11 MMOL/L — SIGNIFICANT CHANGE UP (ref 5–17)
ANION GAP SERPL CALC-SCNC: 9 MMOL/L — SIGNIFICANT CHANGE UP (ref 5–17)
APPEARANCE UR: CLEAR — SIGNIFICANT CHANGE UP
BILIRUB UR-MCNC: NEGATIVE — SIGNIFICANT CHANGE UP
BUN SERPL-MCNC: 16 MG/DL — SIGNIFICANT CHANGE UP (ref 7–23)
BUN SERPL-MCNC: 19 MG/DL — SIGNIFICANT CHANGE UP (ref 7–23)
CALCIUM SERPL-MCNC: 7.7 MG/DL — LOW (ref 8.4–10.5)
CALCIUM SERPL-MCNC: 8 MG/DL — LOW (ref 8.4–10.5)
CALCIUM SERPL-MCNC: 8 MG/DL — LOW (ref 8.4–10.5)
CHLORIDE SERPL-SCNC: 102 MMOL/L — SIGNIFICANT CHANGE UP (ref 96–108)
CHLORIDE SERPL-SCNC: 103 MMOL/L — SIGNIFICANT CHANGE UP (ref 96–108)
CO2 SERPL-SCNC: 19 MMOL/L — LOW (ref 22–31)
CO2 SERPL-SCNC: 19 MMOL/L — LOW (ref 22–31)
COLOR SPEC: YELLOW — SIGNIFICANT CHANGE UP
CREAT SERPL-MCNC: 0.92 MG/DL — SIGNIFICANT CHANGE UP (ref 0.5–1.3)
CREAT SERPL-MCNC: 0.97 MG/DL — SIGNIFICANT CHANGE UP (ref 0.5–1.3)
DIFF PNL FLD: ABNORMAL
EGFR: 59 ML/MIN/1.73M2 — LOW
EGFR: 63 ML/MIN/1.73M2 — SIGNIFICANT CHANGE UP
GLUCOSE SERPL-MCNC: 102 MG/DL — HIGH (ref 70–99)
GLUCOSE SERPL-MCNC: 94 MG/DL — SIGNIFICANT CHANGE UP (ref 70–99)
GLUCOSE UR QL: NEGATIVE MG/DL — SIGNIFICANT CHANGE UP
HCT VFR BLD CALC: 28.3 % — LOW (ref 34.5–45)
HGB BLD-MCNC: 9.4 G/DL — LOW (ref 11.5–15.5)
KETONES UR-MCNC: ABNORMAL MG/DL
LEUKOCYTE ESTERASE UR-ACNC: NEGATIVE — SIGNIFICANT CHANGE UP
MCHC RBC-ENTMCNC: 29.3 PG — SIGNIFICANT CHANGE UP (ref 27–34)
MCHC RBC-ENTMCNC: 33.2 G/DL — SIGNIFICANT CHANGE UP (ref 32–36)
MCV RBC AUTO: 88.2 FL — SIGNIFICANT CHANGE UP (ref 80–100)
NITRITE UR-MCNC: NEGATIVE — SIGNIFICANT CHANGE UP
NRBC # BLD: 0 /100 WBCS — SIGNIFICANT CHANGE UP (ref 0–0)
OSMOLALITY UR: 457 MOSM/KG — SIGNIFICANT CHANGE UP (ref 300–900)
PH UR: 5.5 — SIGNIFICANT CHANGE UP (ref 5–8)
PLATELET # BLD AUTO: 315 K/UL — SIGNIFICANT CHANGE UP (ref 150–400)
POTASSIUM SERPL-MCNC: 4.2 MMOL/L — SIGNIFICANT CHANGE UP (ref 3.5–5.3)
POTASSIUM SERPL-MCNC: 4.4 MMOL/L — SIGNIFICANT CHANGE UP (ref 3.5–5.3)
POTASSIUM SERPL-SCNC: 4.2 MMOL/L — SIGNIFICANT CHANGE UP (ref 3.5–5.3)
POTASSIUM SERPL-SCNC: 4.4 MMOL/L — SIGNIFICANT CHANGE UP (ref 3.5–5.3)
PROT UR-MCNC: 30 MG/DL
PTH-INTACT FLD-MCNC: 65 PG/ML — SIGNIFICANT CHANGE UP (ref 15–65)
RBC # BLD: 3.21 M/UL — LOW (ref 3.8–5.2)
RBC # FLD: 14.5 % — SIGNIFICANT CHANGE UP (ref 10.3–14.5)
SODIUM SERPL-SCNC: 131 MMOL/L — LOW (ref 135–145)
SODIUM SERPL-SCNC: 132 MMOL/L — LOW (ref 135–145)
SODIUM UR-SCNC: 79 MMOL/L — SIGNIFICANT CHANGE UP
SP GR SPEC: 1.02 — SIGNIFICANT CHANGE UP (ref 1–1.03)
UROBILINOGEN FLD QL: 0.2 MG/DL — SIGNIFICANT CHANGE UP (ref 0.2–1)
WBC # BLD: 15.98 K/UL — HIGH (ref 3.8–10.5)
WBC # FLD AUTO: 15.98 K/UL — HIGH (ref 3.8–10.5)

## 2024-12-18 PROCEDURE — 99233 SBSQ HOSP IP/OBS HIGH 50: CPT

## 2024-12-18 PROCEDURE — 93010 ELECTROCARDIOGRAM REPORT: CPT

## 2024-12-18 RX ORDER — ERYTHROMYCIN STEARATE 250 MG
400 TABLET ORAL EVERY 8 HOURS
Refills: 0 | Status: DISCONTINUED | OUTPATIENT
Start: 2024-12-18 | End: 2024-12-19

## 2024-12-18 RX ORDER — ONDANSETRON 4 MG/1
4 TABLET ORAL EVERY 6 HOURS
Refills: 0 | Status: DISCONTINUED | OUTPATIENT
Start: 2024-12-18 | End: 2024-12-26

## 2024-12-18 RX ORDER — ONDANSETRON 4 MG/1
4 TABLET ORAL EVERY 6 HOURS
Refills: 0 | Status: DISCONTINUED | OUTPATIENT
Start: 2024-12-18 | End: 2024-12-18

## 2024-12-18 RX ORDER — ENOXAPARIN SODIUM 60 MG/.6ML
30 INJECTION INTRAVENOUS; SUBCUTANEOUS EVERY 24 HOURS
Refills: 0 | Status: DISCONTINUED | OUTPATIENT
Start: 2024-12-18 | End: 2024-12-26

## 2024-12-18 RX ORDER — SODIUM CHLORIDE 9 MG/ML
500 INJECTION, SOLUTION INTRAMUSCULAR; INTRAVENOUS; SUBCUTANEOUS ONCE
Refills: 0 | Status: COMPLETED | OUTPATIENT
Start: 2024-12-18 | End: 2024-12-18

## 2024-12-18 RX ADMIN — Medication 400 MILLIGRAM(S): at 22:35

## 2024-12-18 RX ADMIN — Medication 5 MILLIGRAM(S): at 12:35

## 2024-12-18 RX ADMIN — CARBIDOPA 25 MILLIGRAM(S): 25 TABLET ORAL at 07:17

## 2024-12-18 RX ADMIN — Medication 5 MILLIGRAM(S): at 07:17

## 2024-12-18 RX ADMIN — SODIUM CHLORIDE 100 MILLILITER(S): 9 INJECTION, SOLUTION INTRAMUSCULAR; INTRAVENOUS; SUBCUTANEOUS at 18:00

## 2024-12-18 RX ADMIN — LEVODOPA AND CARBIDOPA 2 CAPSULE(S): 145; 36.25 CAPSULE, EXTENDED RELEASE ORAL at 07:17

## 2024-12-18 RX ADMIN — Medication 400 MILLIGRAM(S): at 17:34

## 2024-12-18 RX ADMIN — ENOXAPARIN SODIUM 30 MILLIGRAM(S): 60 INJECTION INTRAVENOUS; SUBCUTANEOUS at 22:37

## 2024-12-18 RX ADMIN — ONDANSETRON 4 MILLIGRAM(S): 4 TABLET ORAL at 11:43

## 2024-12-18 RX ADMIN — METOCLOPRAMIDE 5 MILLIGRAM(S): 10 TABLET ORAL at 07:17

## 2024-12-18 RX ADMIN — LEVODOPA AND CARBIDOPA 2 CAPSULE(S): 145; 36.25 CAPSULE, EXTENDED RELEASE ORAL at 12:35

## 2024-12-18 RX ADMIN — CARBIDOPA 25 MILLIGRAM(S): 25 TABLET ORAL at 12:35

## 2024-12-18 RX ADMIN — CARBIDOPA 25 MILLIGRAM(S): 25 TABLET ORAL at 17:34

## 2024-12-18 RX ADMIN — LEVODOPA AND CARBIDOPA 2 CAPSULE(S): 145; 36.25 CAPSULE, EXTENDED RELEASE ORAL at 17:34

## 2024-12-18 NOTE — PROGRESS NOTE ADULT - SUBJECTIVE AND OBJECTIVE BOX
Ortho Note    Pt comfortable without complaints, pain controlled  Denies CP, SOB, N/V, numbness/tingling     Vital Signs Last 24 Hrs  T(C): 37.2 (12-18-24 @ 08:55), Max: 37.2 (12-18-24 @ 08:55)  T(F): 98.9 (12-18-24 @ 08:55), Max: 98.9 (12-18-24 @ 08:55)  HR: 99 (12-18-24 @ 08:55) (94 - 101)  BP: 159/80 (12-18-24 @ 08:55) (144/80 - 160/81)  BP(mean): --  RR: 18 (12-18-24 @ 08:55) (18 - 18)  SpO2: 94% (12-18-24 @ 08:55) (94% - 97%)  I&O's Summary    17 Dec 2024 07:01  -  18 Dec 2024 07:00  --------------------------------------------------------  IN: 0 mL / OUT: 2300 mL / NET: -2300 mL        General: Pt Alert and oriented, NAD  LUE:   Dressing C/D/I: sugartong splint LUE, carterblock pillow applied   Sensation: diminished LUE 2/2 supraclavicular nerve block   Motor: wiggle fingers    LLE:  SILT  Motor 5/5 TA/GS/EHL/FHL  Pulses 2+ marked                        9.4    15.98 )-----------( 315      ( 18 Dec 2024 05:30 )             28.3     12-18    131[L]  |  103  |  16  ----------------------------<  94  4.2   |  19[L]  |  0.92    Ca    8.0[L]      18 Dec 2024 05:30    TPro  5.3[L]  /  Alb  2.6[L]  /  TBili  0.4  /  DBili  x   /  AST  22  /  ALT  <5[L]  /  AlkPhos  148[H]  12-17      A/P: 79yFemale s/p Left Hip Periprosthetic ORIF by Dr. BRENDEN Loja on 12-16 and REJI ORIF 12/12  - Stable  - Pain Control  - DVT ppx: SCDs, LVX POD1  - Post op abx: Ancef  - WBS: WBAT LLE  Dispo : PHYLLIS    Ortho Pager [phone]   Ortho Note    Pt comfortable without complaints, pain controlled  Denies CP, SOB, N/V, numbness/tingling     Vital Signs Last 24 Hrs  T(C): 37.2 (12-18-24 @ 08:55), Max: 37.2 (12-18-24 @ 08:55)  T(F): 98.9 (12-18-24 @ 08:55), Max: 98.9 (12-18-24 @ 08:55)  HR: 99 (12-18-24 @ 08:55) (94 - 101)  BP: 159/80 (12-18-24 @ 08:55) (144/80 - 160/81)  BP(mean): --  RR: 18 (12-18-24 @ 08:55) (18 - 18)  SpO2: 94% (12-18-24 @ 08:55) (94% - 97%)  I&O's Summary    17 Dec 2024 07:01  -  18 Dec 2024 07:00  --------------------------------------------------------  IN: 0 mL / OUT: 2300 mL / NET: -2300 mL        General: Pt Alert and oriented, NAD  LUE:   Dressing C/D/I: sugartong splint LUE, carterblock pillow applied   Sensation: SILT  Motor: wiggle fingers    LLE:  SILT  Motor 5/5 TA/GS/EHL/FHL  Pulses 2+ marked                        9.4    15.98 )-----------( 315      ( 18 Dec 2024 05:30 )             28.3     12-18    131[L]  |  103  |  16  ----------------------------<  94  4.2   |  19[L]  |  0.92    Ca    8.0[L]      18 Dec 2024 05:30    TPro  5.3[L]  /  Alb  2.6[L]  /  TBili  0.4  /  DBili  x   /  AST  22  /  ALT  <5[L]  /  AlkPhos  148[H]  12-17      A/P: 79yFemale s/p Left Hip Periprosthetic ORIF by Dr. BRENDEN Loja on 12-16 and DRF ORIF 12/12  - Stable  - Pain Control  - DVT ppx: SCDs, LVX POD1  - Post op abx: Ancef  - WBS: WBAT LLE  Dispo : PHYLLIS    Ortho Pager [phone]   Ortho Note    Pt comfortable without complaints, pain controlled  Denies CP, SOB, N/V, numbness/tingling     Vital Signs Last 24 Hrs  T(C): 37.2 (12-18-24 @ 08:55), Max: 37.2 (12-18-24 @ 08:55)  T(F): 98.9 (12-18-24 @ 08:55), Max: 98.9 (12-18-24 @ 08:55)  HR: 99 (12-18-24 @ 08:55) (94 - 101)  BP: 159/80 (12-18-24 @ 08:55) (144/80 - 160/81)  BP(mean): --  RR: 18 (12-18-24 @ 08:55) (18 - 18)  SpO2: 94% (12-18-24 @ 08:55) (94% - 97%)  I&O's Summary    17 Dec 2024 07:01  -  18 Dec 2024 07:00  --------------------------------------------------------  IN: 0 mL / OUT: 2300 mL / NET: -2300 mL        General: Pt Alert and oriented, NAD  LUE:   Dressing C/D/I: sugartong splint LUE, carterblock pillow applied   Sensation: SILT  Motor: wiggle fingers    LLE:  SILT  Motor 5/5 TA/GS/EHL/FHL  Pulses 2+ marked                        9.4    15.98 )-----------( 315      ( 18 Dec 2024 05:30 )             28.3     12-18    131[L]  |  103  |  16  ----------------------------<  94  4.2   |  19[L]  |  0.92    Ca    8.0[L]      18 Dec 2024 05:30    TPro  5.3[L]  /  Alb  2.6[L]  /  TBili  0.4  /  DBili  x   /  AST  22  /  ALT  <5[L]  /  AlkPhos  148[H]  12-17      A/P: 79yFemale s/p Left Hip Periprosthetic ORIF by Dr. BRENDEN Loja on 12-16 and DRF ORIF 12/12  - Stable  - Pain Control  - DVT ppx: SCDs, [ ]F/u when to resum Lvx  - Post op abx: Ancef  - WBS: Protected weight bearing  LLE posterior hip precautions, NWB L UE  Dispo : [facility]        Ortho Pager [phone]

## 2024-12-18 NOTE — PROGRESS NOTE ADULT - ASSESSMENT
Ms. Alma Osorio is a 79/F with Parkinson disease, HTN, prior left hemiarthroplasty in 2021 and a recent admission (11/25/24-11/26/24) after a mechanical fall subsequently found to have L hip periprosthetic fracture and L distal radial fracture who presented back to [facility] per outpatient surgeon request for left distal radius open reduction and internal fixation surgery. She underwent closed reduction of the left distal radius. She is now s/p periprosthetic left hip ORIF.    Recommendations:    #Left distal radius fracture  #Left hip periprosthetic fracture  #Post op status  - Provide adequate analgesia, Incentive spirometry, mobilize with fall precautions, bowel regimen, DVT prophylaxis  - PT/OT    #Tachycardia  - obtain orthostatic vitals  - provide adequate analgesia  - normal TSH  - if persistently tachycardic then please order an EKG and consider chest CTA to r/o PE    #Hyponatremia  - urine Osm 457, no urine Na, likely SIADH 2/2 pain  - fluid restriction of 1.2L/day  - trial of PO urea daily and monitor Na  - BMP q12, avoid overcorrection of Na (not more than 6-8 meq/24 hours)    #Anemia, acute on chronic iron deficiency anemia  #Thrombocytosis, possibly reactive, improved  - Tsat 16%, ferritin 252, B12 848, haptoglobin 244  - Monitor Hgb  - Transfuse for Hgb < 7  - Ensure Type and Screen available    #Pseudohypocalcemia  - corrected Ca (Alb 2.7) 8.6  - can send Alb with next Ca level vs ionized Ca  - PTH normal, Vit D 25 OH 28.7, can given Vit D 2000 IU daily  - send Mg, Phos  - monitor for now  - reports taking unrecalled Vit D dose at home; reconcile home meds    #HTN (hypertension)  - on Losartan 100 mg daily which can be resume with BP parameters if no contraindication from orthopedics  - BP monitoring    #Parkinson disease  - on selegiline 5mg BID, carbidopa levodopa 23.75-95 XR 2 tabs TID, carbidopa 25 1 tab TID, domperidone 10mg tid    DVT ppx: Lovenox    Feel free to reach out for any questions. Recommendations discussed with primary team.        Ms. Alma Osorio is a 79/F with Parkinson disease, HTN, prior left hemiarthroplasty in 2021 and a recent admission (11/25/24-11/26/24) after a mechanical fall subsequently found to have L hip periprosthetic fracture and L distal radial fracture who presented back to [facility] per outpatient surgeon request for left distal radius open reduction and internal fixation surgery. She underwent closed reduction of the left distal radius. She is now s/p periprosthetic left hip ORIF.    Recommendations:    #Left distal radius fracture  #Left hip periprosthetic fracture  #Post op status  - Provide adequate analgesia, Incentive spirometry, mobilize with fall precautions, bowel regimen, DVT prophylaxis  - PT/OT    #Tachycardia  - could be from volume depletion based on my discussion with Dr. Loja and her intraop course  - can try NS at 100 cc/hr x 500 to 1L and monitor urine output and HR response; ensure accurate monitoring of urine output while giving IVF  - repeat BMP to ensure Na is monitored tonight   - obtain orthostatic vitals, monitor urine output  - provide adequate analgesia  - normal TSH  - if persistently tachycardic then please order an EKG and consider chest CTA to r/o PE    #Hyponatremia  - urine Osm 457, no urine Na, likely SIADH 2/2 pain  - fluid restriction of 1.2L/day  - trial of PO urea daily and monitor Na  - BMP q12, avoid overcorrection of Na (not more than 6-8 meq/24 hours)    #Anemia, acute on chronic iron deficiency anemia  #Thrombocytosis, possibly reactive, improved  - Tsat 16%, ferritin 252, B12 848, haptoglobin 244  - Monitor Hgb  - Transfuse for Hgb < 7  - Ensure Type and Screen available    #Pseudohypocalcemia  - corrected Ca (Alb 2.7) 8.6  - can send Alb with next Ca level vs ionized Ca  - PTH normal, Vit D 25 OH 28.7, can given Vit D 2000 IU daily  - send Mg, Phos  - monitor for now  - reports taking unrecalled Vit D dose at home; reconcile home meds    #HTN (hypertension)  - on Losartan 100 mg daily which can be resume with BP parameters if no contraindication from orthopedics  - BP monitoring    #Parkinson disease  - on selegiline 5mg BID, carbidopa levodopa 23.75-95 XR 2 tabs TID, carbidopa 25 1 tab TID, domperidone 10mg tid    DVT ppx: Lovenox    Feel free to reach out for any questions. Recommendations discussed with primary team.

## 2024-12-18 NOTE — PROGRESS NOTE ADULT - SUBJECTIVE AND OBJECTIVE BOX
Patient is a 79y old  Female who presents with a chief complaint of WRIST FRACTURE (16 Dec 2024 13:49).    Patient seen and examined today. She has some wrist and hip pain but not worsening. She denies cough, fever, dyspnea, headache, dysuria, diarrhea, abdominal pain, vomiting, body aches.     Allergies    penicillin (Swelling)  sulfa drugs (Nausea)  codeine (Nausea)    Last Bowel Movement: 15-Dec-2024 (24 @ 10:23)    MEDICATIONS  (STANDING):  carbidopa 25 milliGRAM(s) Oral <User Schedule>  carbidopa 23.75 mG/levodopa 95 mG ER 2 Capsule(s) Oral <User Schedule>  enoxaparin Injectable 30 milliGRAM(s) SubCutaneous every 24 hours  erythromycin    ethylsuccinate Suspension 40 mG/mL 400 milliGRAM(s) Oral every 8 hours  HYDROmorphone  Injectable 0.4 milliGRAM(s) IV Push once  influenza  Vaccine (HIGH DOSE) 0.5 milliLiter(s) IntraMuscular once  polyethylene glycol 3350 17 Gram(s) Oral at bedtime  povidone iodine 10% Nasal Swab 1 Application(s) Both Nostrils once  selegiline Oral Tab/Cap 5 milliGRAM(s) Oral <User Schedule>  senna 2 Tablet(s) Oral at bedtime    MEDICATIONS  (PRN):  acetaminophen     Tablet .. 1000 milliGRAM(s) Oral every 8 hours PRN Mild Pain (1 - 3)  HYDROmorphone  Injectable 0.5 milliGRAM(s) IV Push every 3 hours PRN Breakthrough pain  HYDROmorphone  Injectable 0.5 milliGRAM(s) IV Push every 15 minutes PRN breakthrough pain in the PACU  HYDROmorphone  Injectable 0.5 milliGRAM(s) IV Push every 6 hours PRN breakthrough pain  magnesium hydroxide Suspension 30 milliLiter(s) Oral daily PRN Constipation  ondansetron    Tablet 4 milliGRAM(s) Oral every 6 hours PRN Nausea and/or Vomiting  oxyCODONE    IR 10 milliGRAM(s) Oral every 4 hours PRN Severe Pain (7 - 10)  oxyCODONE    IR 5 milliGRAM(s) Oral every 4 hours PRN Moderate Pain (4 - 6)    Vital Signs Last 24 Hrs  T(C): 37.2 (24 @ 14:36), Max: 37.2 (24 @ 20:15)  T(F): 98.9 (24 @ 14:36), Max: 98.9 (24 @ 20:15)  HR: 104 (24 @ 14:36) (94 - 104)  BP: 143/82 (24 @ 14:36) (134/79 - 160/81)  BP(mean): --  RR: 18 (24 @ 14:36) (18 - 18)  SpO2: 95% (24 @ 14:36) (94% - 99%)      I&O's Summary    17 Dec 2024 07:01  -  18 Dec 2024 07:00  --------------------------------------------------------  IN: 0 mL / OUT: 2300 mL / NET: -2300 mL    18 Dec 2024 07:01  -  18 Dec 2024 14:48  --------------------------------------------------------  IN: 0 mL / OUT: 400 mL / NET: -400 mL      Oxygen Saturation Index= Unable to calculate   [Based on FiO2 = Unknown, SpO2 = 95(2024 14:36), MAP = Unknown]    PHYSICAL EXAM:  GENERAL: NAD  HEAD: Normocephalic  EYES: EOMI, conjunctiva and sclera clear  ENMT: Moist mucous membranes  NECK: Supple, No JVD  NERVOUS SYSTEM:  Alert and oriented x 3  CHEST/LUNG: Clear to auscultation bilaterally  HEART: Tachycardic, Regular rhythm; Normal S1 and S2  ABDOMEN: Soft, Nontender, Bowel sounds present  EXTREMITIES:  immobilized left UE; left hip dressing c/d/i; 2+ Peripheral Pulses  PSYCH: Normal mood     Consultant(s) Notes Reviewed:  [x ] YES  [ ] NO  Care Discussed with Consultants/Other Providers [ x] YES  [ ] NO      Investigations:                        9.4    15.98 )-----------( 315      ( 18 Dec 2024 05:30 )             28.3     12-18    131[L]  |  103  |  16  ----------------------------<  94  4.2   |  19[L]  |  0.92    Ca    8.0[L]      18 Dec 2024 05:30    TPro  5.3[L]  /  Alb  2.6[L]  /  TBili  0.4  /  DBili  x   /  AST  22  /  ALT  <5[L]  /  AlkPhos  148[H]  -    Urinalysis with Rflx Culture (collected 18 Dec 2024 09:11)      Urinalysis Basic - ( 18 Dec 2024 09:11 )    Color: Yellow / Appearance: Clear / S.016 / pH: x  Gluc: x / Ketone: Trace mg/dL  / Bili: Negative / Urobili: 0.2 mg/dL   Blood: x / Protein: 30 mg/dL / Nitrite: Negative   Leuk Esterase: Negative / RBC: 55 /HPF / WBC 5 /HPF   Sq Epi: x / Non Sq Epi: 2 /HPF / Bacteria: Negative /HPF

## 2024-12-19 LAB
ANION GAP SERPL CALC-SCNC: 10 MMOL/L — SIGNIFICANT CHANGE UP (ref 5–17)
BUN SERPL-MCNC: 18 MG/DL — SIGNIFICANT CHANGE UP (ref 7–23)
CALCIUM SERPL-MCNC: 8.2 MG/DL — LOW (ref 8.4–10.5)
CHLORIDE SERPL-SCNC: 103 MMOL/L — SIGNIFICANT CHANGE UP (ref 96–108)
CO2 SERPL-SCNC: 22 MMOL/L — SIGNIFICANT CHANGE UP (ref 22–31)
CREAT SERPL-MCNC: 1.02 MG/DL — SIGNIFICANT CHANGE UP (ref 0.5–1.3)
EGFR: 56 ML/MIN/1.73M2 — LOW
GLUCOSE SERPL-MCNC: 110 MG/DL — HIGH (ref 70–99)
HCT VFR BLD CALC: 27.5 % — LOW (ref 34.5–45)
HGB BLD-MCNC: 9.1 G/DL — LOW (ref 11.5–15.5)
MCHC RBC-ENTMCNC: 29.4 PG — SIGNIFICANT CHANGE UP (ref 27–34)
MCHC RBC-ENTMCNC: 33.1 G/DL — SIGNIFICANT CHANGE UP (ref 32–36)
MCV RBC AUTO: 89 FL — SIGNIFICANT CHANGE UP (ref 80–100)
NRBC # BLD: 0 /100 WBCS — SIGNIFICANT CHANGE UP (ref 0–0)
PLATELET # BLD AUTO: 327 K/UL — SIGNIFICANT CHANGE UP (ref 150–400)
POTASSIUM SERPL-MCNC: 4 MMOL/L — SIGNIFICANT CHANGE UP (ref 3.5–5.3)
POTASSIUM SERPL-SCNC: 4 MMOL/L — SIGNIFICANT CHANGE UP (ref 3.5–5.3)
RBC # BLD: 3.09 M/UL — LOW (ref 3.8–5.2)
RBC # FLD: 14.6 % — HIGH (ref 10.3–14.5)
SODIUM SERPL-SCNC: 135 MMOL/L — SIGNIFICANT CHANGE UP (ref 135–145)
WBC # BLD: 12.76 K/UL — HIGH (ref 3.8–10.5)
WBC # FLD AUTO: 12.76 K/UL — HIGH (ref 3.8–10.5)

## 2024-12-19 PROCEDURE — 99233 SBSQ HOSP IP/OBS HIGH 50: CPT

## 2024-12-19 RX ADMIN — Medication 5 MILLIGRAM(S): at 07:34

## 2024-12-19 RX ADMIN — CARBIDOPA 25 MILLIGRAM(S): 25 TABLET ORAL at 07:33

## 2024-12-19 RX ADMIN — Medication 400 MILLIGRAM(S): at 07:33

## 2024-12-19 RX ADMIN — LEVODOPA AND CARBIDOPA 2 CAPSULE(S): 145; 36.25 CAPSULE, EXTENDED RELEASE ORAL at 18:29

## 2024-12-19 RX ADMIN — Medication 5 MILLIGRAM(S): at 12:56

## 2024-12-19 RX ADMIN — LEVODOPA AND CARBIDOPA 2 CAPSULE(S): 145; 36.25 CAPSULE, EXTENDED RELEASE ORAL at 07:33

## 2024-12-19 RX ADMIN — ACETAMINOPHEN 1000 MILLIGRAM(S): 80 SOLUTION/ DROPS ORAL at 14:40

## 2024-12-19 RX ADMIN — ACETAMINOPHEN 1000 MILLIGRAM(S): 80 SOLUTION/ DROPS ORAL at 13:46

## 2024-12-19 RX ADMIN — ACETAMINOPHEN 1000 MILLIGRAM(S): 80 SOLUTION/ DROPS ORAL at 21:46

## 2024-12-19 RX ADMIN — CARBIDOPA 25 MILLIGRAM(S): 25 TABLET ORAL at 18:29

## 2024-12-19 RX ADMIN — CARBIDOPA 25 MILLIGRAM(S): 25 TABLET ORAL at 12:56

## 2024-12-19 RX ADMIN — ACETAMINOPHEN 1000 MILLIGRAM(S): 80 SOLUTION/ DROPS ORAL at 22:45

## 2024-12-19 RX ADMIN — LEVODOPA AND CARBIDOPA 2 CAPSULE(S): 145; 36.25 CAPSULE, EXTENDED RELEASE ORAL at 12:56

## 2024-12-19 RX ADMIN — ENOXAPARIN SODIUM 30 MILLIGRAM(S): 60 INJECTION INTRAVENOUS; SUBCUTANEOUS at 21:46

## 2024-12-19 NOTE — PROGRESS NOTE ADULT - SUBJECTIVE AND OBJECTIVE BOX
Ortho Note    Pt  with appropriately controlled pain  Denies CP, SOB, N/V, numbness/tingling     Vital Signs Last 24 Hrs  T(C): 37 (12-19-24 @ 05:45), Max: 37 (12-19-24 @ 05:45)  T(F): 98.6 (12-19-24 @ 05:45), Max: 98.6 (12-19-24 @ 05:45)  HR: 91 (12-19-24 @ 05:45) (91 - 101)  BP: 155/78 (12-19-24 @ 05:45) (146/76 - 155/78)  BP(mean): --  RR: 18 (12-19-24 @ 05:45) (18 - 19)  SpO2: 94% (12-19-24 @ 05:45) (94% - 94%)  I&O's Summary    17 Dec 2024 07:01  -  18 Dec 2024 07:00  --------------------------------------------------------  IN: 0 mL / OUT: 2300 mL / NET: -2300 mL    18 Dec 2024 07:01  -  19 Dec 2024 06:36  --------------------------------------------------------  IN: 500 mL / OUT: 900 mL / NET: -400 mL      General: Pt Alert and oriented, NAD  LUE:   Dressing C/D/I: sugartong splint LUE, carterblock pillow applied   Sensation: SILT  Motor: wiggle fingers    LLE:  SILT  Motor 5/5 TA/GS/EHL/FHL  Pulses 2+ marked                        9.4    15.98 )-----------( 315      ( 18 Dec 2024 05:30 )             28.3     12-18    132[L]  |  102  |  19  ----------------------------<  102[H]  4.4   |  19[L]  |  0.97    Ca    8.0[L]      18 Dec 2024 14:00    TPro  5.3[L]  /  Alb  2.6[L]  /  TBili  0.4  /  DBili  x   /  AST  22  /  ALT  <5[L]  /  AlkPhos  148[H]  12-17      A/P: 79yFemale POD# s/p   - Stable  - Pain Control  - DVT ppx:  - PT, WBS:     Ortho Pager [phone] Ortho Note    Pt  with appropriately controlled pain  Denies CP, SOB, N/V, numbness/tingling     Vital Signs Last 24 Hrs  T(C): 37 (12-19-24 @ 05:45), Max: 37 (12-19-24 @ 05:45)  T(F): 98.6 (12-19-24 @ 05:45), Max: 98.6 (12-19-24 @ 05:45)  HR: 91 (12-19-24 @ 05:45) (91 - 101)  BP: 155/78 (12-19-24 @ 05:45) (146/76 - 155/78)  BP(mean): --  RR: 18 (12-19-24 @ 05:45) (18 - 19)  SpO2: 94% (12-19-24 @ 05:45) (94% - 94%)  I&O's Summary    17 Dec 2024 07:01  -  18 Dec 2024 07:00  --------------------------------------------------------  IN: 0 mL / OUT: 2300 mL / NET: -2300 mL    18 Dec 2024 07:01  -  19 Dec 2024 06:36  --------------------------------------------------------  IN: 500 mL / OUT: 900 mL / NET: -400 mL      General: Pt Alert and oriented, NAD  LUE:   Dressing C/D/I: sugartong splint LUE, carterblock pillow applied   Sensation: SILT  Motor: wiggle fingers    LLE:  SILT  Motor 5/5 TA/GS/EHL/FHL  Pulses 2+ marked                        9.4    15.98 )-----------( 315      ( 18 Dec 2024 05:30 )             28.3     12-18    132[L]  |  102  |  19  ----------------------------<  102[H]  4.4   |  19[L]  |  0.97    Ca    8.0[L]      18 Dec 2024 14:00    TPro  5.3[L]  /  Alb  2.6[L]  /  TBili  0.4  /  DBili  x   /  AST  22  /  ALT  <5[L]  /  AlkPhos  148[H]  12-17    A/P: 79yFemale s/p Left Hip Periprosthetic ORIF by Dr. BRENDEN Loja on 12-16 and REJI ORIF 12/12  - Stable  - Pain Control  - DVT ppx: SCDs, LVX POD1  - Post op abx: Ancef  - WBS: WBAT LLE  Dispo : [facility]    Ortho Pager [phone] Ortho Note    Pt  with appropriately controlled pain  Denies CP, SOB, N/V, numbness/tingling     Vital Signs Last 24 Hrs  T(C): 37 (12-19-24 @ 05:45), Max: 37 (12-19-24 @ 05:45)  T(F): 98.6 (12-19-24 @ 05:45), Max: 98.6 (12-19-24 @ 05:45)  HR: 91 (12-19-24 @ 05:45) (91 - 101)  BP: 155/78 (12-19-24 @ 05:45) (146/76 - 155/78)  BP(mean): --  RR: 18 (12-19-24 @ 05:45) (18 - 19)  SpO2: 94% (12-19-24 @ 05:45) (94% - 94%)  I&O's Summary    17 Dec 2024 07:01  -  18 Dec 2024 07:00  --------------------------------------------------------  IN: 0 mL / OUT: 2300 mL / NET: -2300 mL    18 Dec 2024 07:01  -  19 Dec 2024 06:36  --------------------------------------------------------  IN: 500 mL / OUT: 900 mL / NET: -400 mL      General: Pt Alert and oriented, NAD  LUE:   Dressing C/D/I: sugartong splint LUE, carterblock pillow applied   Sensation: SILT  Motor: wiggle fingers    LLE:  SILT  Motor 5/5 TA/GS/EHL/FHL  Pulses 2+ marked                        9.4    15.98 )-----------( 315      ( 18 Dec 2024 05:30 )             28.3     12-18    132[L]  |  102  |  19  ----------------------------<  102[H]  4.4   |  19[L]  |  0.97    Ca    8.0[L]      18 Dec 2024 14:00    TPro  5.3[L]  /  Alb  2.6[L]  /  TBili  0.4  /  DBili  x   /  AST  22  /  ALT  <5[L]  /  AlkPhos  148[H]  12-17    A/P: 79yFemale s/p Left Hip Periprosthetic ORIF by Dr. BRENDEN Loja on 12-16 and REJI ORIF 12/12  - Stable  - Pain Control  - DVT ppx: SCDs, [ ]F/u when to resum Lvx  - Post op abx: Ancef  - WBS: Protected weight bearing  LLE posterior hip precautions, NWB L UE  - per pharm reglan c/i for GI motility rec erythromycin/zofran combo until patient has home domperidone   - 12/18 silva removed [x] TOV, fluid restrict 1.2L, f/u AM labs, CT r/o PE cancelled per med given persistent tachycardia, per med 500cc NS bolus over 5h  Dispo : [facility]    Ortho Pager [phone]

## 2024-12-19 NOTE — PROGRESS NOTE ADULT - ASSESSMENT
Ms. Alma Osorio is a 79/F with Parkinson disease, HTN, prior left hemiarthroplasty in 2021 and a recent admission (11/25/24-11/26/24) after a mechanical fall subsequently found to have L hip periprosthetic fracture and L distal radial fracture who presented back to [facility] per outpatient surgeon request for left distal radius open reduction and internal fixation surgery. She underwent closed reduction of the left distal radius. She is now s/p periprosthetic left hip ORIF.    Recommendations:    #Left distal radius fracture  #Left hip periprosthetic fracture  #Post op status  - Provide adequate analgesia, Incentive spirometry, mobilize with fall precautions, bowel regimen, DVT prophylaxis  - PT/OT    #Tachycardia likely from volume depletion, improved  - HR 88 bpm manually and much improved after IVF overnight after discussion with Dr. Loja of primary orthopedics team; asymptomatic, not hypoxic  - ensure adequate analgesia  - obtain orthostatic vitals, monitor urine output  - normal TSH  - if tachycardia recurs and persists despite adequate volume repletion and pain control then please order an EKG and consider chest CTA to r/o PE    #Hyponatremia, improved  - urine Osm 457, no urine Na, likely SIADH 2/2 pain  - fluid restriction of 1.2L/day    #Anemia, acute on chronic iron deficiency anemia  #Thrombocytosis, possibly reactive, improved  - Tsat 16%, ferritin 252, B12 848, haptoglobin 244  - Monitor Hgb  - Transfuse for Hgb < 7  - Ensure Type and Screen available  - can offer ferrous sulfate 325 mg every other day with bowel regimen    #Pseudohypocalcemia  - corrected Ca normal  - PTH normal, Vit D 25 OH 28.7, can give Vit D 2000 IU daily  - monitor for now  - reports taking unrecalled Vit D dose at home; reconcile home meds    #HTN (hypertension)  - on Losartan 100 mg daily which can be resume with BP parameters if no contraindication from orthopedics  - BP monitoring    #Parkinson disease  - on selegiline 5mg BID, carbidopa levodopa 23.75-95 XR 2 tabs TID, carbidopa 25 1 tab TID  - PT/OT      DVT ppx: Lovenox    Feel free to reach out for any questions. Recommendations discussed with primary team.        Ms. Alma Osorio is a 79/F with Parkinson disease, HTN, prior left hemiarthroplasty in 2021 and a recent admission (11/25/24-11/26/24) after a mechanical fall subsequently found to have L hip periprosthetic fracture and L distal radial fracture who presented back to [facility] per outpatient surgeon request for left distal radius open reduction and internal fixation surgery. She underwent closed reduction of the left distal radius. She is now s/p periprosthetic left hip ORIF.    Recommendations:    #Left distal radius fracture  #Left hip periprosthetic fracture  #Post op status  - Provide adequate analgesia, Incentive spirometry, mobilize with fall precautions, bowel regimen, DVT prophylaxis  - PT/OT    #Tachycardia likely from volume depletion, improved  - HR 88 bpm manually and much improved after IVF overnight after discussion with Dr. Loja of primary orthopedics team; asymptomatic, not hypoxic  - ensure adequate analgesia  - obtain orthostatic vitals, monitor urine output  - normal TSH  - if tachycardia recurs and persists despite adequate volume repletion and pain control then please order an EKG and consider chest CTA to r/o PE    #Hyponatremia, improved  - urine Osm 457, no urine Na, likely SIADH 2/2 pain  - fluid restriction of 1.2L/day    #Anemia, acute on chronic iron deficiency anemia  #Thrombocytosis, possibly reactive, improved  - Tsat 16%, ferritin 252, B12 848, haptoglobin 244  - Monitor Hgb  - Transfuse for Hgb < 7  - Ensure Type and Screen available  - can offer ferrous sulfate 325 mg every other day with bowel regimen    #Pseudohypocalcemia  - corrected Ca normal  - PTH normal, Vit D 25 OH 28.7, can give Vit D 2000 IU daily  - monitor for now  - reports taking unrecalled Vit D dose at home; reconcile home meds    #HTN (hypertension)  - on Losartan 100 mg daily which can be resume with BP parameters if no contraindication from orthopedics  - BP monitoring    #Parkinson disease  - on selegiline 5mg BID, carbidopa levodopa 23.75-95 XR 2 tabs TID, carbidopa 25 1 tab TID  - PT/OT    #Gastroparesis  - on Erythromycin in the hospital and on Domperidone at home  - aspiration precautions    DVT ppx: Lovenox    Feel free to reach out for any questions. Recommendations discussed with primary team.

## 2024-12-19 NOTE — DISCHARGE NOTE PROVIDER - CARE PROVIDER_API CALL
Doug Loja  Orthopaedic Surgery  [address] [place] [id number]  Phone: [phone]  Fax: [phone]  Follow Up Time: 1 week   Doug Loja  Orthopaedic Surgery  [address] [place] [id number]  Phone: [phone]  Fax: [phone]  Follow Up Time:

## 2024-12-19 NOTE — DISCHARGE NOTE PROVIDER - HOSPITAL COURSE
Admitted on 12/11/24  79y Female with L distal radius and L hip periprosthetic fx 11/24, now s/p closed reduction of L distal radius in OR 12/12, s/p Left Hip Periprosthetic ORIF 12/16  Attending: Dr. Loja:   Tala-op Antibiotics  Pain control  DVT prophylaxis  OOB/Physical Therapy/Occupational Therapy   Medicine Consult for co management     Hospital course:    79-year-old with prior history of Parkinson's, hypertension and mechanical fall on 11/24 resulting in L DRF and L periprosthetic hip fracture    Pt was initally seen and admitted 11/25  and sent to [place] [facility],with  follow-up to see Dr. Loja. Pt with repeat imaging with worsening of fracture.  Per Dr. Loja wrist would not heal properly without surgery, and advised for admission to [facility] for surgical intervention.      12/12: OR for left wrist ORIF- ORIF aborted due to skin compromise from splint, repeat closed reduction performed in OR with replacement of sugartong splint  12/16: OR for left hip periprosthetic ORIF, given 3u PRBC and 1unit FFP intraop for 1L EBL  12/17: hip drain removed  12/18: Holloway removed, fluid restricted for hyponatremia, 500cc NS bolus given for mild tachycardia, lovenox resumed post op   12/19: hyponatremia/tachycardia improved, pending PHYLLIS placement          Admitted on 12/11/24  79y Female with L distal radius and L hip periprosthetic fx 11/24, now s/p closed reduction of L distal radius in OR 12/12, s/p Left Hip Periprosthetic ORIF 12/16  Attending: Dr. Loja:   Tala-op Antibiotics  Pain control  DVT prophylaxis  OOB/Physical Therapy/Occupational Therapy   Medicine Consult for co management     Hospital course:    79-year-old with prior history of Parkinson's, hypertension and mechanical fall on 11/24 resulting in L DRF and L periprosthetic hip fracture    Pt was initally seen and admitted 11/25  and sent to [place] [facility],with  follow-up to see Dr. Loja. Pt with repeat imaging with worsening of fracture.  Per Dr. Loja wrist would not heal properly without surgery, and advised for admission to [facility] for surgical intervention.      12/12: OR for left wrist ORIF- ORIF aborted due to skin compromise from splint, repeat closed reduction performed in OR with replacement of sugartong splint  12/16: OR for left hip periprosthetic ORIF, given 3u PRBC and 1unit FFP intraop for 1L EBL  12/17: hip drain removed  12/18: Holloway removed, fluid restricted for hyponatremia, 500cc NS bolus given for mild tachycardia, lovenox resumed post op   12/19: hyponatremia/tachycardia improved, pending PHYLLIS placement   12/20: you were having loose stools, so a stool sample was collected for C. Difficile testing due to your history.          Admitted on 12/11/24  79y Female with L distal radius and L hip periprosthetic fx 11/24, now s/p closed reduction of L distal radius in OR 12/12, s/p Left Hip Periprosthetic ORIF 12/16  Attending: Dr. Loja:   Tala-op Antibiotics  Pain control  DVT prophylaxis  OOB/Physical Therapy/Occupational Therapy   Medicine Consult for co management     Hospital course:    79-year-old with prior history of Parkinson's, hypertension and mechanical fall on 11/24 resulting in L DRF and L periprosthetic hip fracture    Pt was initally seen and admitted 11/25  and sent to [place] [facility],with  follow-up to see Dr. Loja. Pt with repeat imaging with worsening of fracture.  Per Dr. Loja wrist would not heal properly without surgery, and advised for admission to [facility] for surgical intervention.      12/12: OR for left wrist ORIF- ORIF aborted due to skin compromise from splint, repeat closed reduction performed in OR with replacement of sugartong splint  12/16: OR for left hip periprosthetic ORIF, given 3u PRBC and 1unit FFP intraop for 1L EBL  12/17: hip drain removed  12/18: Holloway removed, fluid restricted for hyponatremia, 500cc NS bolus given for mild tachycardia, lovenox resumed post op   12/19: hyponatremia/tachycardia improved, pending PHYLLIS placement   12/20: you were having loose stools, so a stool sample was collected for C. Difficile testing due to your history.   12/22: Vancomycin started for + C. Difficile and Infectious Disease Consulted          Admitted on 12/11/24  79y Female with L distal radius and L hip periprosthetic fx 11/24, now s/p closed reduction of L distal radius in OR 12/12, s/p Left Hip Periprosthetic ORIF 12/16  Attending: Dr. Loja:   Tala-op Antibiotics  Pain control  DVT prophylaxis  OOB/Physical Therapy/Occupational Therapy   Medicine Consult for co management     Hospital course:    79-year-old with prior history of Parkinson's, hypertension and mechanical fall on 11/24 resulting in L DRF and L periprosthetic hip fracture    Pt was initally seen and admitted 11/25  and sent to [place] [facility],with  follow-up to see Dr. Loja. Pt with repeat imaging with worsening of fracture.  Per Dr. Loja wrist would not heal properly without surgery, and advised for admission to [facility] for surgical intervention.      12/12: OR for left wrist ORIF- ORIF aborted due to skin compromise from splint, repeat closed reduction performed in OR with replacement of sugartong splint  12/16: OR for left hip periprosthetic ORIF, given 3u PRBC and 1unit FFP intraop for 1L EBL  12/17: hip drain removed  12/18: Holloway removed, fluid restricted for hyponatremia, 500cc NS bolus given for mild tachycardia, lovenox resumed post op   12/19: hyponatremia/tachycardia improved, pending PHYLLIS placement   12/20: you were having loose stools, so a stool sample was collected for C. Difficile testing due to your history.   12/22: Vancomycin started for + C. Difficile and Infectious Disease Consulted     Final ID Recs: Pulsed Vancomycin Regimen  125 mg every 6 hours to complete 14 days (10 days left starting 12/26), then  125 mg every 12 hours for 7 days, then  125 mg daily for 7 days, then,   125 mg every other day for 7 days, then  125 mg every third day for 14 days.          Admitted on 12/11/24  79y Female with L distal radius and L hip periprosthetic fx 11/24, now s/p closed reduction of L distal radius in OR 12/12, s/p Left Hip Periprosthetic ORIF 12/16  Attending: Dr. Loja:   Tala-op Antibiotics  Pain control  DVT prophylaxis  OOB/Physical Therapy/Occupational Therapy   Medicine Consult for co management     Hospital course:    79-year-old with prior history of Parkinson's, hypertension and mechanical fall on 11/24 resulting in L DRF and L periprosthetic hip fracture    Pt was initially seen and admitted 11/25  and sent to [place] [facility], with  follow-up to see Dr. Loja. Pt with repeat imaging with worsening of fracture.  Per Dr. Loja wrist would not heal properly without surgery, and advised for admission to [facility] for surgical intervention.      12/12: OR for left wrist ORIF- ORIF aborted due to skin compromise from splint, repeat closed reduction performed in OR with replacement of sugartong splint  12/16: OR for left hip periprosthetic ORIF, given 3u PRBC and 1unit FFP intraop for 1L EBL  12/17: hip drain removed  12/18: Holloway removed, fluid restricted for hyponatremia, 500cc NS bolus given for mild tachycardia, lovenox resumed post op   12/19: hyponatremia/tachycardia improved, pending PHYLLIS placement   12/20: you were having loose stools, so a stool sample was collected for C. Difficile testing due to your history.   12/22: Vancomycin started for + C. Difficile and Infectious Disease Consulted   12/23: continued to monitor symptoms  12/24: continued to monitor symptoms   12/25: continued to monitor symptoms  12/26: Dressing changed to new Aquacel, this may removed 12/30/24 and can remove staples on 12/30/24    ________________  Final ID Recs: Pulsed Vancomycin Regimen  125 mg every 6 hours to complete 14 days (10 days left starting 12/26), then  125 mg every 12 hours for 7 days, then  125 mg daily for 7 days, then,   125 mg every other day for 7 days, then  125 mg every third day for 14 days.          Admitted on 12/11/24  79y Female with L distal radius and L hip periprosthetic fx 11/24, now s/p closed reduction of L distal radius in OR 12/12, s/p Left Hip Periprosthetic ORIF 12/16  Attending: Dr. Loja:   Tala-op Antibiotics  Pain control  DVT prophylaxis  OOB/Physical Therapy/Occupational Therapy   Medicine Consult for co management     Hospital course:    79-year-old with prior history of Parkinson's, hypertension and mechanical fall on 11/24 resulting in L DRF and L periprosthetic hip fracture    Pt was initially seen and admitted 11/25  and sent to [place] [facility], with  follow-up to see Dr. Loja. Pt with repeat imaging with worsening of fracture.  Per Dr. Loja wrist would not heal properly without surgery, and advised for admission to [facility] for surgical intervention.      12/12: OR for left wrist ORIF- ORIF aborted due to skin compromise from splint, repeat closed reduction performed in OR with replacement of sugartong splint  12/16: OR for left hip periprosthetic ORIF, given 3u PRBC and 1unit FFP intraop for 1L EBL  12/17: hip drain removed  12/18: Holloway removed, fluid restricted for hyponatremia, 500cc NS bolus given for mild tachycardia, lovenox resumed post op   12/19: hyponatremia/tachycardia improved, pending PHYLLIS placement   12/20: you were having loose stools, so a stool sample was collected for C. Difficile testing due to your history.   12/22: Vancomycin started for + C. Difficile and Infectious Disease Consulted   12/23: continued to monitor symptoms  12/24: continued to monitor symptoms   12/25: continued to monitor symptoms  12/26: Dressing changed to new Aquacel, this may removed 12/30/24 and can remove staples on 12/30/24    ____________________________________  Final ID Recs: Pulsed Vancomycin Regimen  125 mg every 6 hours to complete 14 days (10 days left starting 12/26), then  125 mg every 12 hours for 7 days, then  125 mg daily for 7 days, then,   125 mg every other day for 7 days, then  125 mg every third day for 14 days.       C. Diff advised that your visitors wash hands frequently, do not provide any food for others until you have been free of loose stool for 1 weeks.

## 2024-12-19 NOTE — DISCHARGE NOTE PROVIDER - NSDCCPCAREPLAN_GEN_ALL_CORE_FT
PRINCIPAL DISCHARGE DIAGNOSIS  Diagnosis: Wrist fracture  Assessment and Plan of Treatment:       SECONDARY DISCHARGE DIAGNOSES  Diagnosis: Fractured hip  Assessment and Plan of Treatment:

## 2024-12-19 NOTE — PROGRESS NOTE ADULT - SUBJECTIVE AND OBJECTIVE BOX
Patient is a 79y old  Female who presents with a chief complaint of WRIST FRACTURE (16 Dec 2024 13:49).    Patient seen and examined today. She reports some post op pain for which pain medications help. She denies headache, vomiting, chest pain, dyspnea, hemoptysis, abdominal pain, fever, dizziness.     Allergies    penicillin (Swelling)  sulfa drugs (Nausea)  codeine (Nausea)    Last Bowel Movement: 18-Dec-2024 (12-18-24 @ 20:30)    MEDICATIONS  (STANDING):  carbidopa 25 milliGRAM(s) Oral <User Schedule>  carbidopa 23.75 mG/levodopa 95 mG ER 2 Capsule(s) Oral <User Schedule>  enoxaparin Injectable 30 milliGRAM(s) SubCutaneous every 24 hours  erythromycin    ethylsuccinate Suspension 40 mG/mL 400 milliGRAM(s) Oral every 8 hours  HYDROmorphone  Injectable 0.4 milliGRAM(s) IV Push once  influenza  Vaccine (HIGH DOSE) 0.5 milliLiter(s) IntraMuscular once  polyethylene glycol 3350 17 Gram(s) Oral at bedtime  povidone iodine 10% Nasal Swab 1 Application(s) Both Nostrils once  selegiline Oral Tab/Cap 5 milliGRAM(s) Oral <User Schedule>  senna 2 Tablet(s) Oral at bedtime  sodium chloride 0.9% Bolus 500 milliLiter(s) IV Bolus once    MEDICATIONS  (PRN):  acetaminophen     Tablet .. 1000 milliGRAM(s) Oral every 8 hours PRN Mild Pain (1 - 3)  HYDROmorphone  Injectable 0.5 milliGRAM(s) IV Push every 3 hours PRN Breakthrough pain  HYDROmorphone  Injectable 0.5 milliGRAM(s) IV Push every 15 minutes PRN breakthrough pain in the PACU  magnesium hydroxide Suspension 30 milliLiter(s) Oral daily PRN Constipation  ondansetron    Tablet 4 milliGRAM(s) Oral every 6 hours PRN Nausea and/or Vomiting    Vital Signs Last 24 Hrs  T(C): 36.7 (12-19-24 @ 10:18), Max: 37.2 (12-18-24 @ 14:36)  T(F): 98 (12-19-24 @ 10:18), Max: 99 (12-18-24 @ 20:30)  HR: 90 (12-19-24 @ 10:18) (90 - 104)  BP: 132/78 (12-19-24 @ 10:18) (130/76 - 155/78)  BP(mean): --  RR: 18 (12-19-24 @ 10:18) (18 - 19)  SpO2: 95% (12-19-24 @ 10:18) (94% - 97%)    I&O's Summary    18 Dec 2024 07:01  -  19 Dec 2024 07:00  --------------------------------------------------------  IN: 500 mL / OUT: 800 mL / NET: -300 mL    Oxygen Saturation Index= Unable to calculate   [Based on FiO2 = Unknown, SpO2 = 95(12/19/2024 10:18), MAP = Unknown]    PHYSICAL EXAM:  GENERAL: NAD  HEAD: Normocephalic  EYES: EOMI, conjunctiva and sclera clear  ENMT: Moist mucous membranes  NECK: Supple, No JVD  NERVOUS SYSTEM:  Alert and oriented x 3  CHEST/LUNG: Clear to auscultation bilaterally  HEART: HR 88 bpm manually taken; Regular rhythm; Normal S1 and S2  ABDOMEN: Soft, Nontender, Bowel sounds present  EXTREMITIES:  immobilized left UE; left hip dressing c/d/i; 2+ Peripheral Pulses  PSYCH: Normal mood     Consultant(s) Notes Reviewed:  [x ] YES  [ ] NO  Care Discussed with Consultants/Other Providers [ x] YES  [ ] NO    Investigations:                        9.1    12.76 )-----------( 327      ( 19 Dec 2024 05:30 )             27.5     12-19    135  |  103  |  18  ----------------------------<  110[H]  4.0   |  22  |  1.02    Ca    8.2[L]      19 Dec 2024 05:30    TPro  5.3[L]  /  Alb  2.6[L]  /  TBili  0.4  /  DBili  x   /  AST  22  /  ALT  <5[L]  /  AlkPhos  148[H]  12-17

## 2024-12-19 NOTE — DISCHARGE NOTE PROVIDER - NSDCFUADDINST_GEN_ALL_CORE_FT
s/p closed reduction of L distal radius in OR 12/12, s/p Left Hip Periprosthetic ORIF 12/16    ACTIVITY:   - non weight bearing left upper extremity/ protected weight bearing left lower extremity assistive device. No strenuous activity, heavy lifting, driving or returning to work until cleared by MD.   - Apply a cold compress to the surgical site several times daily to reduce pain and swelling. For icing, twenty-minute sessions followed by an hour off is recommended. You should ice as frequently as possible. Ice should NEVER be placed directly on the skin. Wearing compression stockings during the first week after surgery can help reduce swelling in your knee, calf and foot, but is not required.      (POSTERIOR HIP REPLACEMENTS)   Hip precautions:   The following precautions will remain in effect for 6 weeks following surgery:   Do not cross your knees.   Do not flex your hip (i.e., bring your knee toward your chest) beyond 90 degrees.   Use a raised toilet seat. Do not use low chairs or couches.   Sleep with a pillow between your knees.   Do not reach down to  items on the floor.         DRESSING/SHOWERING:   Recommend sponge bathing until your wrist splint is removed  Left upper extremity: keep splint clean/dry/intact, do not remove until seen by Dr. Loja   (AQUACEL – brown gel dressing)   - Left hip dressing is water resistant. Do not soak in bathtubs. Remove dressing after postop day 7, then leave incision open to air. You may do this yourself (simply peel it off), or you may ask for assistance from your visiting nurse. Keep your incision clean and dry. Do not pick at your incision. Do not apply creams, ointments or oils to your incision until cleared by your surgeon. Do not soak your incision in sitting water (ie tubs, pools, lakes, etc.) until cleared by your surgeon. Do not scrub the incision – instead, allow soap and water to flow over the incision and then pat it dry with a clean towel.     MEDICATION/ANTICOAGULATION:   -You have been prescribed lovenox, as a preventative to help prevent postoperative blood clots. Please take this medication as prescribed.    - You have been prescribed medications for pain:   - Tylenol for mild to moderate pain. Do not exceed 3,000mg daily.   - For more severe pain, take Tylenol with the addition of narcotic pain medication. Take this medication as prescribed. This medication may cause drowsiness or dizziness. Do not operate machinery. This medication may cause constipation.   - For any additional medications, follow instructions on the bottle.    -Try to have regular bowel movements. Take stool softener or laxative if necessary. You may wish to take Miralax daily until you have regular bowel movements.    - If you have been prescribed Aspirin or an anti-inflammatory, please take prilosec (omeprazole) once a day, before breakfast, until no longer taking Aspirin or anti-inflammatory. This will help protect your stomach.   - If you have a pain management physician, please follow-up with them postoperatively.    - If you experience any negative side effects of your medications, please call your surgeon's office to discuss.     FOLLOW-UP:   - Call to schedule an appt with Dr. Loja for follow up.   - Please follow-up with your primary care physician or any other specialist you see postoperatively, if needed.    - Contact your doctor or go to the emergency room if you experience: fever greater than 101.5, chills, chest pain, difficulty breathing, redness or excessive drainage around the incision, other concerns. s/p closed reduction of L distal radius in OR 12/12, s/p Left Hip Periprosthetic ORIF 12/16    ACTIVITY:   - non weight bearing left upper extremity/ protected weight bearing left lower extremity assistive device. No strenuous activity, heavy lifting, driving or returning to work until cleared by MD.   - Apply a cold compress to the surgical site several times daily to reduce pain and swelling. For icing, twenty-minute sessions followed by an hour off is recommended. You should ice as frequently as possible. Ice should NEVER be placed directly on the skin. Wearing compression stockings during the first week after surgery can help reduce swelling in your knee, calf and foot, but is not required.      (POSTERIOR HIP REPLACEMENTS)   Hip precautions:   The following precautions will remain in effect for 6 weeks following surgery:   Do not cross your knees.   Do not flex your hip (i.e., bring your knee toward your chest) beyond 90 degrees.   Use a raised toilet seat. Do not use low chairs or couches.   Sleep with a pillow between your knees.   Do not reach down to  items on the floor.       DRESSING/SHOWERING:   Recommend sponge bathing until your wrist splint is removed  Left upper extremity: keep splint clean/dry/intact, do not remove until seen by Dr. Loja   (AQUACEL – brown gel dressing)   - Left hip dressing is water resistant. Do not soak in bathtubs. Remove dressing after postop day 7, then leave incision open to air. You may do this yourself (simply peel it off), or you may ask for assistance from your visiting nurse. Keep your incision clean and dry. Do not pick at your incision. Do not apply creams, ointments or oils to your incision until cleared by your surgeon. Do not soak your incision in sitting water (ie tubs, pools, lakes, etc.) until cleared by your surgeon. Do not scrub the incision – instead, allow soap and water to flow over the incision and then pat it dry with a clean towel.     MEDICATION/ANTICOAGULATION:   -You have been prescribed lovenox, as a preventative to help prevent postoperative blood clots. Please take this medication as prescribed.    - You have been prescribed medications for pain:   - Tylenol for mild to moderate pain. Do not exceed 3,000mg daily.   - For more severe pain, take Tylenol with the addition of narcotic pain medication. Take this medication as prescribed. This medication may cause drowsiness or dizziness. Do not operate machinery. This medication may cause constipation.   - For any additional medications, follow instructions on the bottle.    -Try to have regular bowel movements. Take stool softener or laxative if necessary. You may wish to take Miralax daily until you have regular bowel movements.    - If you have been prescribed Aspirin or an anti-inflammatory, please take prilosec (omeprazole) once a day, before breakfast, until no longer taking Aspirin or anti-inflammatory. This will help protect your stomach.   - If you have a pain management physician, please follow-up with them postoperatively.    - If you experience any negative side effects of your medications, please call your surgeon's office to discuss.     FOLLOW-UP:   - Call to schedule an appt with Dr. Loja for follow up.   - Please follow-up with your primary care physician or any other specialist you see postoperatively, if needed.    - Contact your doctor or go to the emergency room if you experience: fever greater than 101.5, chills, chest pain, difficulty breathing, redness or excessive drainage around the incision, other concerns. s/p closed reduction of L distal radius in OR 12/12, s/p Left Hip Periprosthetic ORIF 12/16    ACTIVITY:   - non weight bearing left upper extremity/ protected weight bearing left lower extremity assistive device. No strenuous activity, heavy lifting, driving or returning to work until cleared by MD.   - Apply a cold compress to the surgical site several times daily to reduce pain and swelling. For icing, twenty-minute sessions followed by an hour off is recommended. You should ice as frequently as possible. Ice should NEVER be placed directly on the skin. Wearing compression stockings during the first week after surgery can help reduce swelling in your knee, calf and foot, but is not required.      (POSTERIOR HIP REPLACEMENTS)   Hip precautions:   The following precautions will remain in effect for 6 weeks following surgery:   Do not cross your knees.   Do not flex your hip (i.e., bring your knee toward your chest) beyond 90 degrees.   Use a raised toilet seat. Do not use low chairs or couches.   Sleep with a pillow between your knees.   Do not reach down to  items on the floor.     DRESSING/SHOWERING:   Recommend sponge bathing until your wrist splint is removed  Left upper extremity: keep splint clean/dry/intact, do not remove until seen by Dr. Loja   (AQUACEL – brown gel dressing): May remove 12/30/24 and staple may be removed on 12/30/24.   - Left hip dressing is water resistant. Do not soak in bathtubs. Remove dressing after postop day 7, then leave incision open to air. You may do this yourself (simply peel it off), or you may ask for assistance from your visiting nurse. Keep your incision clean and dry. Do not pick at your incision. Do not apply creams, ointments or oils to your incision until cleared by your surgeon. Do not soak your incision in sitting water (ie tubs, pools, lakes, etc.) until cleared by your surgeon. Do not scrub the incision – instead, allow soap and water to flow over the incision and then pat it dry with a clean towel.     MEDICATION/ANTICOAGULATION:   -You have been prescribed lovenox, as a preventative to help prevent postoperative blood clots. Please take this medication as prescribed.    - You have been prescribed medications for pain:   - Tylenol for mild to moderate pain. Do not exceed 3,000mg daily.   - For more severe pain, take Tylenol with the addition of narcotic pain medication. Take this medication as prescribed. This medication may cause drowsiness or dizziness. Do not operate machinery. This medication may cause constipation.   - For any additional medications, follow instructions on the bottle.    -Try to have regular bowel movements. Take stool softener or laxative if necessary. You may wish to take Miralax daily until you have regular bowel movements.    - If you have been prescribed Aspirin or an anti-inflammatory, please take prilosec (omeprazole) once a day, before breakfast, until no longer taking Aspirin or anti-inflammatory. This will help protect your stomach.   - If you have a pain management physician, please follow-up with them postoperatively.    - If you experience any negative side effects of your medications, please call your surgeon's office to discuss.     FOLLOW-UP:   - Call to schedule an appt with Dr. Loja for follow up.   - Please follow-up with your primary care physician or any other specialist you see postoperatively, if needed.    - Contact your doctor or go to the emergency room if you experience: fever greater than 101.5, chills, chest pain, difficulty breathing, redness or excessive drainage around the incision, other concerns.

## 2024-12-19 NOTE — DISCHARGE NOTE PROVIDER - NSDCCPTREATMENT_GEN_ALL_CORE_FT
PRINCIPAL PROCEDURE  Procedure: ORIF, fracture, periprosthetic, hip  Findings and Treatment:       SECONDARY PROCEDURE  Procedure: Closed reduction, fracture, radius, distal  Findings and Treatment:

## 2024-12-19 NOTE — DISCHARGE NOTE PROVIDER - DETAILS OF MALNUTRITION DIAGNOSIS/DIAGNOSES
This patient has been assessed with a concern for Malnutrition and was treated during this hospitalization for the following Nutrition diagnosis/diagnoses:     -  12/17/2024: Mild protein-calorie malnutrition

## 2024-12-19 NOTE — DISCHARGE NOTE PROVIDER - NSDCMRMEDTOKEN_GEN_ALL_CORE_FT
carbidopa 25 mg oral tablet: 1 tab(s) orally 3 times a day Take at 8:00 am, 1:00 pm, and 5:30 pm  carbidopa-levodopa 23.75 mg-95 mg oral capsule, extended release: 2 cap(s) orally 3 times a day Take at 8:00 am, 1:00 pm, and 5:30 pm  losartan 100 mg oral tablet: 1 tab(s) orally once a day  metoclopramide 10 mg oral tablet: 1 tab(s) orally every 6 hours  selegiline 5 mg oral capsule: 1 cap(s) orally 2 times a day Take at 8:00 am and 1:00 pm   acetaminophen 500 mg oral tablet: 2 tab(s) orally every 8 hours As needed Mild Pain (1 - 3)  carbidopa 25 mg oral tablet: 1 tab(s) orally 3 times a day Take at 8:00 am, 1:00 pm, and 5:30 pm  carbidopa-levodopa 23.75 mg-95 mg oral capsule, extended release: 2 cap(s) orally 3 times a day Take at 8:00 am, 1:00 pm, and 5:30 pm  cholecalciferol oral tablet: 2000 unit(s) orally once a day  enoxaparin: 30 mg Subq inj daily for DVT ppx  losartan 100 mg oral tablet: 1 tab(s) orally once a day  metoclopramide 10 mg oral tablet: 1 tab(s) orally every 6 hours  ondansetron 4 mg oral tablet: 1 tab(s) orally every 6 hours As needed Nausea and/or Vomiting  oxyCODONE 10 mg oral tablet: 1 tab(s) orally every 6 hours As needed Severe Pain (7 - 10)  oxyCODONE 5 mg oral tablet: 1 tab(s) orally every 6 hours As needed Moderate Pain (4 - 6)  selegiline 5 mg oral capsule: 1 cap(s) orally 2 times a day Take at 8:00 am and 1:00 pm   acetaminophen 500 mg oral tablet: 2 tab(s) orally every 8 hours As needed Mild Pain (1 - 3)  carbidopa 25 mg oral tablet: 1 tab(s) orally 3 times a day Take at 8:00 am, 1:00 pm, and 5:30 pm  carbidopa-levodopa 23.75 mg-95 mg oral capsule, extended release: 2 cap(s) orally 3 times a day Take at 8:00 am, 1:00 pm, and 5:30 pm  cholecalciferol oral tablet: 2000 unit(s) orally once a day  enoxaparin: 30 mg Subq inj daily for DVT ppx  lactobacillus acidophilus oral capsule: 1 dose(s) orally once a day  losartan 100 mg oral tablet: 1 tab(s) orally once a day  metoclopramide 10 mg oral tablet: 1 tab(s) orally every 6 hours  ondansetron 4 mg oral tablet: 1 tab(s) orally every 6 hours As needed Nausea and/or Vomiting  oxyCODONE 10 mg oral tablet: 1 tab(s) orally every 6 hours As needed Severe Pain (7 - 10)  oxyCODONE 5 mg oral tablet: 1 tab(s) orally every 6 hours As needed Moderate Pain (4 - 6)  selegiline 5 mg oral capsule: 1 cap(s) orally 2 times a day Take at 8:00 am and 1:00 pm  vancomycin 125 mg oral capsule: 1 cap(s) orally every 12 hours Take 125 mg PO every 12 hours for 7 days   acetaminophen 500 mg oral tablet: 2 tab(s) orally every 8 hours As needed Mild Pain (1 - 3)  carbidopa 25 mg oral tablet: 1 tab(s) orally 3 times a day Take at 8:00 am, 1:00 pm, and 5:30 pm  carbidopa-levodopa 23.75 mg-95 mg oral capsule, extended release: 2 cap(s) orally 3 times a day Take at 8:00 am, 1:00 pm, and 5:30 pm  cholecalciferol oral tablet: 2000 unit(s) orally once a day  enoxaparin 30 mg/0.3 mL injectable solution: 30 milligram(s) subcutaneously once a day  lactobacillus acidophilus oral capsule: 1 dose(s) orally once a day  losartan 100 mg oral tablet: 1 tab(s) orally once a day  metoclopramide 10 mg oral tablet: 1 tab(s) orally every 6 hours  Multiple Vitamins oral tablet: 1 tab(s) orally once a day  ondansetron 4 mg oral tablet: 1 tab(s) orally every 6 hours As needed Nausea and/or Vomiting  oxyCODONE 10 mg oral tablet: 1 tab(s) orally every 6 hours As needed Severe Pain (7 - 10)  oxyCODONE 5 mg oral tablet: 1 tab(s) orally every 6 hours As needed Moderate Pain (4 - 6)  selegiline 5 mg oral capsule: 1 cap(s) orally 2 times a day Take at 8:00 am and 1:00 pm  vancomycin 125 mg oral capsule: 1 cap(s) orally every 6 hours Take 125 mg orally every 6 hours for 14 days (12/22/24 - 1/4/25)  vancomycin 125 mg oral capsule: 1 cap(s) orally every 12 hours Take 125 mg orally every 12 hours for 7 days (1/5/25 - 1/11/25)  vancomycin 125 mg oral capsule: 1 cap(s) orally once a day Take 125 mg orally daily for 7 days (1/12/25 - 1/18/25)  vancomycin 125 mg oral capsule: 1 cap(s) orally every other day Take 125 mg orally every other day for 7 days (1/19/25 - 1/25/25)  vancomycin 125 mg oral capsule: 1 cap(s) orally every 3 days Take 125 mg orally every 3 days for 14 days (1/26/25 - 2/8/25)

## 2024-12-19 NOTE — PROGRESS NOTE ADULT - SUBJECTIVE AND OBJECTIVE BOX
Ortho  Progress Note      Surgery: POD # 3 s/p ORIF left hip periprosthetic fx and closed reduction for left distal radius fx on 12/13     Patient seen and examined at bedside this AM   Pt reports pain controlled  Denies CP, SOB, N/V, numbness/tingling   reports left elbow pain improved today following splint adjustment yesterday     Vital Signs Last 24 Hrs  T(C): 36.7 (12-19-24 @ 10:18), Max: 37 (12-19-24 @ 05:45)  T(F): 98 (12-19-24 @ 10:18), Max: 98.6 (12-19-24 @ 05:45)  HR: 90 (12-19-24 @ 10:18) (90 - 91)  BP: 132/78 (12-19-24 @ 10:18) (132/78 - 155/78)  BP(mean): --  RR: 18 (12-19-24 @ 10:18) (18 - 18)  SpO2: 95% (12-19-24 @ 10:18) (94% - 95%)      General: Pt Alert and oriented, NAD  Extremity: LUE/LLE   Dressing: STS LUE intact, aquacel left hip dry, intact   Bilateral lower extremities warm, well perfused,  pulses symmetric, 2+   Sensation: intact to light touch bilateral LE   Motor: quad/EHL/FHL/TA/GS firing bilaterally  abduction pillow in place       A/P: 79yFemale POD # 3 s/p ORIF left hip periprosthetic fx and closed reduction for left distal radius fx on 12/13     - Labs and vitals reviewed, medically stable, mild tachycardia yesterday- improved today   - hyponatremia: resolved today, fluid restriction yesterday, discontinued today   - s/p 500cc bolus given last night for tachycardia- improved  - continue with pain control PRN  - antibiotics: post op ancef completed  - DVT ppx: c/w lovenox 30mg q24 hrs   - WBS: NWB LUE, NWB LLE   - PT/OT  - continue Incentive Spirometer, bowel regimen, GI ppx   - continue diet: regular  - Dispo: eval'd for Dr Purvi STEVE recommending [facility], placement pending     Ortho Pager [phone] Ortho  Progress Note      Surgery: POD # 3 s/p ORIF left hip periprosthetic fx and closed reduction for left distal radius fx on 12/13     Patient seen and examined at bedside this AM   Pt reports pain controlled  Denies CP, SOB, N/V, numbness/tingling   reports left elbow pain improved today following splint adjustment yesterday     Vital Signs Last 24 Hrs  T(C): 36.7 (12-19-24 @ 10:18), Max: 37 (12-19-24 @ 05:45)  T(F): 98 (12-19-24 @ 10:18), Max: 98.6 (12-19-24 @ 05:45)  HR: 90 (12-19-24 @ 10:18) (90 - 91)  BP: 132/78 (12-19-24 @ 10:18) (132/78 - 155/78)  BP(mean): --  RR: 18 (12-19-24 @ 10:18) (18 - 18)  SpO2: 95% (12-19-24 @ 10:18) (94% - 95%)      General: Pt Alert and oriented, NAD  Extremity: LUE/LLE   Dressing: STS LUE intact, aquacel left hip dry, intact   Bilateral lower extremities warm, well perfused,  pulses symmetric, 2+   Sensation: intact to light touch bilateral LE   Motor: quad/EHL/FHL/TA/GS firing bilaterally  abduction pillow in place       A/P: 79yFemale POD # 3 s/p ORIF left hip periprosthetic fx and closed reduction for left distal radius fx on 12/13     - Labs and vitals reviewed, medically stable, mild tachycardia yesterday- improved today   - hyponatremia: resolved today, fluid restriction yesterday, discontinued today   - s/p 500cc bolus given last night for tachycardia- improved  - continue with pain control PRN  - antibiotics: post op ancef completed  - DVT ppx: c/w lovenox 30mg q24 hrs   - WBS: NWB  LUE, protected WB LLE with posterior hip precautions   - PT/OT  - continue Incentive Spirometer, bowel regimen, GI ppx   - continue diet: regular  - Dispo: eval'd for Dr Purvi STEVE recommending [facility], placement pending     Ortho Pager [phone]

## 2024-12-20 LAB
ANION GAP SERPL CALC-SCNC: 7 MMOL/L — SIGNIFICANT CHANGE UP (ref 5–17)
BUN SERPL-MCNC: 20 MG/DL — SIGNIFICANT CHANGE UP (ref 7–23)
CALCIUM SERPL-MCNC: 8.1 MG/DL — LOW (ref 8.4–10.5)
CHLORIDE SERPL-SCNC: 104 MMOL/L — SIGNIFICANT CHANGE UP (ref 96–108)
CO2 SERPL-SCNC: 23 MMOL/L — SIGNIFICANT CHANGE UP (ref 22–31)
CREAT SERPL-MCNC: 0.97 MG/DL — SIGNIFICANT CHANGE UP (ref 0.5–1.3)
EGFR: 59 ML/MIN/1.73M2 — LOW
GLUCOSE SERPL-MCNC: 97 MG/DL — SIGNIFICANT CHANGE UP (ref 70–99)
HCT VFR BLD CALC: 26.9 % — LOW (ref 34.5–45)
HGB BLD-MCNC: 8.6 G/DL — LOW (ref 11.5–15.5)
MCHC RBC-ENTMCNC: 29.2 PG — SIGNIFICANT CHANGE UP (ref 27–34)
MCHC RBC-ENTMCNC: 32 G/DL — SIGNIFICANT CHANGE UP (ref 32–36)
MCV RBC AUTO: 91.2 FL — SIGNIFICANT CHANGE UP (ref 80–100)
NRBC # BLD: 0 /100 WBCS — SIGNIFICANT CHANGE UP (ref 0–0)
PLATELET # BLD AUTO: 320 K/UL — SIGNIFICANT CHANGE UP (ref 150–400)
POTASSIUM SERPL-MCNC: 3.5 MMOL/L — SIGNIFICANT CHANGE UP (ref 3.5–5.3)
POTASSIUM SERPL-SCNC: 3.5 MMOL/L — SIGNIFICANT CHANGE UP (ref 3.5–5.3)
RBC # BLD: 2.95 M/UL — LOW (ref 3.8–5.2)
RBC # FLD: 14.5 % — SIGNIFICANT CHANGE UP (ref 10.3–14.5)
SODIUM SERPL-SCNC: 134 MMOL/L — LOW (ref 135–145)
WBC # BLD: 15.67 K/UL — HIGH (ref 3.8–10.5)
WBC # FLD AUTO: 15.67 K/UL — HIGH (ref 3.8–10.5)

## 2024-12-20 PROCEDURE — 99233 SBSQ HOSP IP/OBS HIGH 50: CPT

## 2024-12-20 RX ORDER — OXYCODONE HCL 15 MG
5 TABLET ORAL EVERY 6 HOURS
Refills: 0 | Status: DISCONTINUED | OUTPATIENT
Start: 2024-12-20 | End: 2024-12-26

## 2024-12-20 RX ORDER — LOSARTAN POTASSIUM 100 MG/1
100 TABLET, FILM COATED ORAL DAILY
Refills: 0 | Status: DISCONTINUED | OUTPATIENT
Start: 2024-12-20 | End: 2024-12-26

## 2024-12-20 RX ORDER — CHOLECALCIFEROL (VITAMIN D3) 10 MCG
2000 TABLET ORAL DAILY
Refills: 0 | Status: DISCONTINUED | OUTPATIENT
Start: 2024-12-20 | End: 2024-12-26

## 2024-12-20 RX ORDER — OXYCODONE HCL 15 MG
10 TABLET ORAL EVERY 6 HOURS
Refills: 0 | Status: DISCONTINUED | OUTPATIENT
Start: 2024-12-20 | End: 2024-12-26

## 2024-12-20 RX ADMIN — CARBIDOPA 25 MILLIGRAM(S): 25 TABLET ORAL at 12:29

## 2024-12-20 RX ADMIN — ENOXAPARIN SODIUM 30 MILLIGRAM(S): 60 INJECTION INTRAVENOUS; SUBCUTANEOUS at 21:36

## 2024-12-20 RX ADMIN — LEVODOPA AND CARBIDOPA 2 CAPSULE(S): 145; 36.25 CAPSULE, EXTENDED RELEASE ORAL at 12:03

## 2024-12-20 RX ADMIN — Medication 5 MILLIGRAM(S): at 07:47

## 2024-12-20 RX ADMIN — ACETAMINOPHEN 1000 MILLIGRAM(S): 80 SOLUTION/ DROPS ORAL at 22:20

## 2024-12-20 RX ADMIN — Medication 5 MILLIGRAM(S): at 12:02

## 2024-12-20 RX ADMIN — ACETAMINOPHEN 1000 MILLIGRAM(S): 80 SOLUTION/ DROPS ORAL at 23:20

## 2024-12-20 RX ADMIN — Medication 2000 UNIT(S): at 12:02

## 2024-12-20 RX ADMIN — LEVODOPA AND CARBIDOPA 2 CAPSULE(S): 145; 36.25 CAPSULE, EXTENDED RELEASE ORAL at 16:36

## 2024-12-20 RX ADMIN — CARBIDOPA 25 MILLIGRAM(S): 25 TABLET ORAL at 16:36

## 2024-12-20 RX ADMIN — LEVODOPA AND CARBIDOPA 2 CAPSULE(S): 145; 36.25 CAPSULE, EXTENDED RELEASE ORAL at 07:49

## 2024-12-20 RX ADMIN — CARBIDOPA 25 MILLIGRAM(S): 25 TABLET ORAL at 07:47

## 2024-12-20 RX ADMIN — ACETAMINOPHEN 1000 MILLIGRAM(S): 80 SOLUTION/ DROPS ORAL at 11:20

## 2024-12-20 NOTE — PROGRESS NOTE ADULT - SUBJECTIVE AND OBJECTIVE BOX
Ortho Note    Pt  with appropriately controlled pain  Denies CP, SOB, N/V, numbness/tingling     Vital Signs Last 24 Hrs  T(C): 37 (12-19-24 @ 05:45), Max: 37 (12-19-24 @ 05:45)  T(F): 98.6 (12-19-24 @ 05:45), Max: 98.6 (12-19-24 @ 05:45)  HR: 91 (12-19-24 @ 05:45) (91 - 101)  BP: 155/78 (12-19-24 @ 05:45) (146/76 - 155/78)  BP(mean): --  RR: 18 (12-19-24 @ 05:45) (18 - 19)  SpO2: 94% (12-19-24 @ 05:45) (94% - 94%)  I&O's Summary    17 Dec 2024 07:01  -  18 Dec 2024 07:00  --------------------------------------------------------  IN: 0 mL / OUT: 2300 mL / NET: -2300 mL    18 Dec 2024 07:01  -  19 Dec 2024 06:36  --------------------------------------------------------  IN: 500 mL / OUT: 900 mL / NET: -400 mL      General: Pt Alert and oriented, NAD  LUE:   Dressing C/D/I: sugartong splint LUE, carterblock pillow applied   Sensation: SILT  Motor: wiggle fingers    LLE:  SILT  Motor 5/5 TA/GS/EHL/FHL  Pulses 2+ marked                        9.4    15.98 )-----------( 315      ( 18 Dec 2024 05:30 )             28.3     12-18    132[L]  |  102  |  19  ----------------------------<  102[H]  4.4   |  19[L]  |  0.97    Ca    8.0[L]      18 Dec 2024 14:00    TPro  5.3[L]  /  Alb  2.6[L]  /  TBili  0.4  /  DBili  x   /  AST  22  /  ALT  <5[L]  /  AlkPhos  148[H]  12-17    A/P: 79yFemale s/p Left Hip Periprosthetic ORIF by Dr. BRENDEN Loja on 12-16 and REJI ORIF 12/12  - Stable  - Pain Control  - DVT ppx: SCDs, [ ]F/u when to resum Lvx  - Post op abx: Ancef  - WBS: Protected weight bearing  LLE posterior hip precautions, NWB L UE  - per pharm reglan c/i for GI motility rec erythromycin/zofran combo until patient has home domperidone   - 12/18 silva removed [x] TOV, fluid restrict 1.2L, f/u AM labs, CT r/o PE cancelled per med given persistent tachycardia, per med 500cc NS bolus over 5h  Dispo : [facility]    Ortho Pager [phone]

## 2024-12-20 NOTE — PROGRESS NOTE ADULT - ASSESSMENT
Ms. Alma Osorio is a 79/F with Parkinson disease, HTN, prior left hemiarthroplasty in 2021 and a recent admission (11/25/24-11/26/24) after a mechanical fall subsequently found to have L hip periprosthetic fracture and L distal radial fracture who presented back to [facility] per outpatient surgeon request for left distal radius open reduction and internal fixation surgery. She underwent closed reduction of the left distal radius. She is now s/p periprosthetic left hip ORIF.    Recommendations:    #Left distal radius fracture  #Left hip periprosthetic fracture  #Post op status  - Provide adequate analgesia, Incentive spirometry, mobilize with fall precautions, bowel regimen, DVT prophylaxis  - PT/OT    #Diarrhea  - formed stools reported this morning  - Miralax and Senna discontinued per primary team  - has history of C diff per patient report, if with loose/watery stools then send stool for C diff     #Tachycardia likely from volume depletion, resolved  - normal TSH  - if tachycardia recurs and persists despite adequate volume repletion and pain control then please order an EKG and consider chest CTA to r/o PE    #Hyponatremia, improved  - urine Osm 457, no urine Na, likely SIADH 2/2 pain  - fluid restriction of 1.5L/day    #Anemia, acute on chronic iron deficiency anemia  #Thrombocytosis, possibly reactive, improved  - Tsat 16%, ferritin 252, B12 848, haptoglobin 244  - Monitor Hgb  - Transfuse for Hgb < 7  - Ensure Type and Screen available  - can offer ferrous sulfate 325 mg every other day with bowel regimen    #Vit D deficiency  - on Vit D supplementation    #HTN (hypertension)  - on Losartan 100 mg daily, ensure BP parameters  - BP monitoring    #Parkinson disease  - on selegiline 5mg BID, carbidopa levodopa 23.75-95 XR 2 tabs TID, carbidopa 25 1 tab TID, Domperidone (patient reports taking this to address the Parkinson's meds side effects, unsure if she also has gastroparesis)  - patient advised to have someone from home bring Domperidone which is NF in the hospital so she can get it inpatient if possible  - PT/OT    DVT ppx: Lovenox    Feel free to reach out for any questions. Recommendations discussed with primary team.        Ms. Alma Osorio is a 79/F with Parkinson disease, HTN, prior left hemiarthroplasty in 2021 and a recent admission (11/25/24-11/26/24) after a mechanical fall subsequently found to have L hip periprosthetic fracture and L distal radial fracture who presented back to [facility] per outpatient surgeon request for left distal radius open reduction and internal fixation surgery. She underwent closed reduction of the left distal radius. She is now s/p periprosthetic left hip ORIF.    Recommendations:    #Left distal radius fracture  #Left hip periprosthetic fracture  #Post op status  - Provide adequate analgesia, Incentive spirometry, mobilize with fall precautions, bowel regimen, DVT prophylaxis  - PT/OT    #Diarrhea  - formed stools reported this morning  - Miralax and Senna discontinued per primary team  - has history of C diff per patient report, if with loose/watery stools then send stool for C diff     #Tachycardia likely from volume depletion, resolved  - normal TSH  - if tachycardia recurs and persists despite adequate volume repletion and pain control then please order an EKG and consider chest CTA to r/o PE    #Hyponatremia, improved  - urine Osm 457, no urine Na, likely SIADH 2/2 pain  - fluid restriction of 1.5L/day    #Anemia, acute on chronic iron deficiency anemia  #Thrombocytosis, possibly reactive, improved  - Tsat 16%, ferritin 252, B12 848, haptoglobin 244  - Monitor Hgb  - Transfuse for Hgb < 7  - Ensure Type and Screen available  - can offer ferrous sulfate 325 mg every other day with bowel regimen    #Vit D deficiency  - on Vit D supplementation    #HTN (hypertension)  - on Losartan 100 mg daily, ensure BP parameters  - BP monitoring    #Parkinson disease  - on selegiline 5mg BID, carbidopa levodopa 23.75-95 XR 2 tabs TID, carbidopa 25 1 tab TID, Domperidone (patient reports taking this to address the Parkinson's meds side effects, unsure if she also has gastroparesis and erythromycin stopped)  - patient advised to have someone from home bring Domperidone which is NF in the hospital so she can get it inpatient if possible  - PT/OT    DVT ppx: Lovenox    Feel free to reach out for any questions. Recommendations discussed with primary team.

## 2024-12-20 NOTE — PROGRESS NOTE ADULT - SUBJECTIVE AND OBJECTIVE BOX
Patient is a 79y old  Female who presents with a chief complaint of WRIST FRACTURE (16 Dec 2024 13:49).    Patient seen and examined. She has mild wrist and hip post op pain. She reports a few episodes of diarrhea overnight and 1 episode of formed bowel movement this morning without abdominal pain, fever, vomiting or any other associated symptoms.     Allergies    penicillin (Swelling)  sulfa drugs (Nausea)  codeine (Nausea)    Last Bowel Movement: 20-Dec-2024 (12-20-24 @ 11:28)    MEDICATIONS  (STANDING):  carbidopa 25 milliGRAM(s) Oral <User Schedule>  carbidopa 23.75 mG/levodopa 95 mG ER 2 Capsule(s) Oral <User Schedule>  cholecalciferol 2000 Unit(s) Oral daily  enoxaparin Injectable 30 milliGRAM(s) SubCutaneous every 24 hours  influenza  Vaccine (HIGH DOSE) 0.5 milliLiter(s) IntraMuscular once  losartan 100 milliGRAM(s) Oral daily  povidone iodine 10% Nasal Swab 1 Application(s) Both Nostrils once  selegiline Oral Tab/Cap 5 milliGRAM(s) Oral <User Schedule>    MEDICATIONS  (PRN):  acetaminophen     Tablet .. 1000 milliGRAM(s) Oral every 8 hours PRN Mild Pain (1 - 3)  HYDROmorphone  Injectable 0.5 milliGRAM(s) IV Push every 3 hours PRN Breakthrough pain  HYDROmorphone  Injectable 0.5 milliGRAM(s) IV Push every 15 minutes PRN breakthrough pain in the PACU  magnesium hydroxide Suspension 30 milliLiter(s) Oral daily PRN Constipation  ondansetron    Tablet 4 milliGRAM(s) Oral every 6 hours PRN Nausea and/or Vomiting      Vital Signs Last 24 Hrs  T(C): 36.7 (12-20-24 @ 12:46), Max: 36.9 (12-20-24 @ 11:28)  T(F): 98.1 (12-20-24 @ 12:46), Max: 98.5 (12-20-24 @ 11:28)  HR: 87 (12-20-24 @ 12:46) (86 - 98)  BP: 141/71 (12-20-24 @ 12:46) (123/72 - 148/87)  BP(mean): 105 (12-20-24 @ 11:28) (93 - 105)  RR: 18 (12-20-24 @ 12:46) (16 - 18)  SpO2: 97% (12-20-24 @ 12:46) (93% - 100%)    I&O's Summary    19 Dec 2024 07:01  -  20 Dec 2024 07:00  --------------------------------------------------------  IN: 200 mL / OUT: 0 mL / NET: 200 mL      Oxygen Saturation Index= Unable to calculate   [Based on FiO2 = Unknown, SpO2 = 97(12/20/2024 12:46), MAP = Unknown]    PHYSICAL EXAM:  GENERAL: NAD  HEAD:  Atraumatic, Normocephalic  EYES: EOMI, PERRLA, conjunctiva and sclera clear  ENMT: Moist mucous membranes  NECK: Supple, No JVD  NERVOUS SYSTEM:  Alert & Oriented X3, Good concentration; nonfocal exam  CHEST/LUNG: Clear to auscultation bilaterally; No rales, rhonchi, wheezing, or rubs  HEART: Regular rate and rhythm; Normal S1 and S2; No murmurs, rubs, or gallops  ABDOMEN: Soft, Nontender, Nondistended; Bowel sounds present  EXTREMITIES:  2+ Peripheral Pulses, No clubbing, cyanosis, or edema  SKIN: No rashes or lesions    Consultant(s) Notes Reviewed:  [x ] YES  [ ] NO  Care Discussed with Consultants/Other Providers [ x] YES  [ ] NO      Investigations:                        8.6    15.67 )-----------( 320      ( 20 Dec 2024 07:08 )             26.9     12-20    134[L]  |  104  |  20  ----------------------------<  97  3.5   |  23  |  0.97    Ca    8.1[L]      20 Dec 2024 07:08

## 2024-12-21 LAB
ANION GAP SERPL CALC-SCNC: 11 MMOL/L — SIGNIFICANT CHANGE UP (ref 5–17)
BUN SERPL-MCNC: 18 MG/DL — SIGNIFICANT CHANGE UP (ref 7–23)
CALCIUM SERPL-MCNC: 8.1 MG/DL — LOW (ref 8.4–10.5)
CHLORIDE SERPL-SCNC: 104 MMOL/L — SIGNIFICANT CHANGE UP (ref 96–108)
CO2 SERPL-SCNC: 22 MMOL/L — SIGNIFICANT CHANGE UP (ref 22–31)
CREAT SERPL-MCNC: 0.93 MG/DL — SIGNIFICANT CHANGE UP (ref 0.5–1.3)
EGFR: 63 ML/MIN/1.73M2 — SIGNIFICANT CHANGE UP
GLUCOSE SERPL-MCNC: 97 MG/DL — SIGNIFICANT CHANGE UP (ref 70–99)
HCT VFR BLD CALC: 26.6 % — LOW (ref 34.5–45)
HGB BLD-MCNC: 8.5 G/DL — LOW (ref 11.5–15.5)
MCHC RBC-ENTMCNC: 28.8 PG — SIGNIFICANT CHANGE UP (ref 27–34)
MCHC RBC-ENTMCNC: 32 G/DL — SIGNIFICANT CHANGE UP (ref 32–36)
MCV RBC AUTO: 90.2 FL — SIGNIFICANT CHANGE UP (ref 80–100)
NRBC # BLD: 0 /100 WBCS — SIGNIFICANT CHANGE UP (ref 0–0)
PLATELET # BLD AUTO: 328 K/UL — SIGNIFICANT CHANGE UP (ref 150–400)
POTASSIUM SERPL-MCNC: 3.4 MMOL/L — LOW (ref 3.5–5.3)
POTASSIUM SERPL-SCNC: 3.4 MMOL/L — LOW (ref 3.5–5.3)
RBC # BLD: 2.95 M/UL — LOW (ref 3.8–5.2)
RBC # FLD: 14.3 % — SIGNIFICANT CHANGE UP (ref 10.3–14.5)
SODIUM SERPL-SCNC: 137 MMOL/L — SIGNIFICANT CHANGE UP (ref 135–145)
WBC # BLD: 10.18 K/UL — SIGNIFICANT CHANGE UP (ref 3.8–10.5)
WBC # FLD AUTO: 10.18 K/UL — SIGNIFICANT CHANGE UP (ref 3.8–10.5)

## 2024-12-21 PROCEDURE — 99233 SBSQ HOSP IP/OBS HIGH 50: CPT

## 2024-12-21 RX ORDER — LABETALOL HCL 300 MG/1
100 TABLET, FILM COATED ORAL ONCE
Refills: 0 | Status: COMPLETED | OUTPATIENT
Start: 2024-12-21 | End: 2024-12-21

## 2024-12-21 RX ORDER — OXYCODONE HCL 15 MG
1 TABLET ORAL
Qty: 0 | Refills: 0 | DISCHARGE
Start: 2024-12-21

## 2024-12-21 RX ORDER — ENOXAPARIN SODIUM 60 MG/.6ML
0 INJECTION INTRAVENOUS; SUBCUTANEOUS
Qty: 0 | Refills: 0 | DISCHARGE
Start: 2024-12-21

## 2024-12-21 RX ORDER — CHOLECALCIFEROL (VITAMIN D3) 10 MCG
2000 TABLET ORAL
Qty: 0 | Refills: 0 | DISCHARGE
Start: 2024-12-21

## 2024-12-21 RX ORDER — ACETAMINOPHEN 80 MG/.8ML
2 SOLUTION/ DROPS ORAL
Qty: 0 | Refills: 0 | DISCHARGE
Start: 2024-12-21

## 2024-12-21 RX ORDER — ONDANSETRON 4 MG/1
1 TABLET ORAL
Qty: 0 | Refills: 0 | DISCHARGE
Start: 2024-12-21

## 2024-12-21 RX ORDER — POTASSIUM CHLORIDE 600 MG/1
40 TABLET, FILM COATED, EXTENDED RELEASE ORAL ONCE
Refills: 0 | Status: COMPLETED | OUTPATIENT
Start: 2024-12-21 | End: 2024-12-21

## 2024-12-21 RX ADMIN — Medication 5 MILLIGRAM(S): at 09:09

## 2024-12-21 RX ADMIN — LABETALOL HCL 100 MILLIGRAM(S): 300 TABLET, FILM COATED ORAL at 19:41

## 2024-12-21 RX ADMIN — CARBIDOPA 25 MILLIGRAM(S): 25 TABLET ORAL at 09:10

## 2024-12-21 RX ADMIN — Medication 2000 UNIT(S): at 13:08

## 2024-12-21 RX ADMIN — LEVODOPA AND CARBIDOPA 2 CAPSULE(S): 145; 36.25 CAPSULE, EXTENDED RELEASE ORAL at 13:09

## 2024-12-21 RX ADMIN — LEVODOPA AND CARBIDOPA 2 CAPSULE(S): 145; 36.25 CAPSULE, EXTENDED RELEASE ORAL at 17:32

## 2024-12-21 RX ADMIN — CARBIDOPA 25 MILLIGRAM(S): 25 TABLET ORAL at 13:08

## 2024-12-21 RX ADMIN — ACETAMINOPHEN 1000 MILLIGRAM(S): 80 SOLUTION/ DROPS ORAL at 09:53

## 2024-12-21 RX ADMIN — ACETAMINOPHEN 1000 MILLIGRAM(S): 80 SOLUTION/ DROPS ORAL at 10:30

## 2024-12-21 RX ADMIN — LOSARTAN POTASSIUM 100 MILLIGRAM(S): 100 TABLET, FILM COATED ORAL at 06:30

## 2024-12-21 RX ADMIN — ENOXAPARIN SODIUM 30 MILLIGRAM(S): 60 INJECTION INTRAVENOUS; SUBCUTANEOUS at 21:35

## 2024-12-21 RX ADMIN — CARBIDOPA 25 MILLIGRAM(S): 25 TABLET ORAL at 17:31

## 2024-12-21 RX ADMIN — POTASSIUM CHLORIDE 40 MILLIEQUIVALENT(S): 600 TABLET, FILM COATED, EXTENDED RELEASE ORAL at 17:33

## 2024-12-21 RX ADMIN — LEVODOPA AND CARBIDOPA 2 CAPSULE(S): 145; 36.25 CAPSULE, EXTENDED RELEASE ORAL at 09:10

## 2024-12-21 RX ADMIN — Medication 5 MILLIGRAM(S): at 13:08

## 2024-12-21 NOTE — PROGRESS NOTE ADULT - ASSESSMENT
Ms. Alma Osorio is a 79/F with Parkinson disease, HTN, prior left hemiarthroplasty in 2021 and a recent admission (11/25/24-11/26/24) after a mechanical fall subsequently found to have L hip periprosthetic fracture and L distal radial fracture who presented back to [facility] per outpatient surgeon request for left distal radius open reduction and internal fixation surgery. She underwent closed reduction of the left distal radius. She is now s/p periprosthetic left hip ORIF.    Recommendations:    #Left distal radius fracture  #Left hip periprosthetic fracture  #Post op status  - Provide adequate analgesia, Incentive spirometry, mobilize with fall precautions, bowel regimen, DVT prophylaxis  - PT/OT    #Diarrhea, resolved  - no BM today   - if diarrhea recurs and has 3-4 loose stools without laxative use in 24 hours then consider sending stool to test for C diff given history of C diff infection in 2021     #Hypokalemia  - replace and monitor  - can check Mg level     #Hyponatremia, improved  - urine Osm 457, no urine Na, likely SIADH 2/2 pain  - fluid restriction of 1.5L/day  - pain control    #Anemia, acute on chronic iron deficiency anemia  #Thrombocytosis, possibly reactive, improved  - Tsat 16%, ferritin 252, B12 848, haptoglobin 244  - Monitor Hgb  - Transfuse for Hgb < 7  - Ensure Type and Screen available  - can offer ferrous sulfate 325 mg every other day with bowel regimen    #Vit D deficiency  - on Vit D supplementation    #HTN (hypertension)  - on Losartan 100 mg daily, ensure BP parameters  - BP monitoring    #Parkinson disease  - on selegiline 5mg BID, carbidopa levodopa 23.75-95 XR 2 tabs TID, carbidopa 25 1 tab TID, Domperidone (patient reports taking this to address the Parkinson's meds side effects, unsure if she also has gastroparesis and erythromycin stopped)  - patient advised to have someone from home bring Domperidone which is NF in the hospital so she can get it inpatient if possible  - PT/OT    DVT ppx: Lovenox    Feel free to reach out for any questions. Recommendations discussed with primary team.

## 2024-12-21 NOTE — PROGRESS NOTE ADULT - SUBJECTIVE AND OBJECTIVE BOX
Patient is a 79y old  Female who presents with a chief complaint of WRIST FRACTURE (16 Dec 2024 13:49).    Patient seen and examined today. She reports no bowel movement/diarrhea since yesterday. She denies worsening wrist or hip pain. She denies abdominal pain, vomiting, fever, chills, bleeding symptoms. dyspnea.    Allergies    penicillin (Swelling)  sulfa drugs (Nausea)  codeine (Nausea)    Last Bowel Movement: 20-Dec-2024 (12-21-24 @ 08:30)      MEDICATIONS  (STANDING):  carbidopa 25 milliGRAM(s) Oral <User Schedule>  carbidopa 23.75 mG/levodopa 95 mG ER 2 Capsule(s) Oral <User Schedule>  cholecalciferol 2000 Unit(s) Oral daily  enoxaparin Injectable 30 milliGRAM(s) SubCutaneous every 24 hours  influenza  Vaccine (HIGH DOSE) 0.5 milliLiter(s) IntraMuscular once  losartan 100 milliGRAM(s) Oral daily  povidone iodine 10% Nasal Swab 1 Application(s) Both Nostrils once  selegiline Oral Tab/Cap 5 milliGRAM(s) Oral <User Schedule>    MEDICATIONS  (PRN):  acetaminophen     Tablet .. 1000 milliGRAM(s) Oral every 8 hours PRN Mild Pain (1 - 3)  HYDROmorphone  Injectable 0.5 milliGRAM(s) IV Push every 3 hours PRN Breakthrough pain  HYDROmorphone  Injectable 0.5 milliGRAM(s) IV Push every 15 minutes PRN breakthrough pain in the PACU  magnesium hydroxide Suspension 30 milliLiter(s) Oral daily PRN Constipation  ondansetron    Tablet 4 milliGRAM(s) Oral every 6 hours PRN Nausea and/or Vomiting  oxyCODONE    IR 5 milliGRAM(s) Oral every 6 hours PRN Moderate Pain (4 - 6)  oxyCODONE    IR 10 milliGRAM(s) Oral every 6 hours PRN Severe Pain (7 - 10)      Vital Signs Last 24 Hrs  T(C): 36.4 (12-21-24 @ 09:00), Max: 36.8 (12-20-24 @ 19:01)  T(F): 97.6 (12-21-24 @ 09:00), Max: 98.3 (12-20-24 @ 19:01)  HR: 79 (12-21-24 @ 09:00) (79 - 97)  BP: 159/80 (12-21-24 @ 09:00) (133/67 - 159/80)  BP(mean): --  RR: 16 (12-21-24 @ 09:00) (16 - 18)  SpO2: 98% (12-21-24 @ 09:00) (93% - 98%)      I&O's Summary    20 Dec 2024 07:01  -  21 Dec 2024 07:00  --------------------------------------------------------  IN: 0 mL / OUT: 300 mL / NET: -300 mL      Oxygen Saturation Index= Unable to calculate   [Based on FiO2 = Unknown, SpO2 = 98(12/21/2024 09:00), MAP = Unknown]    PHYSICAL EXAM:  GENERAL: NAD  HEAD:  Atraumatic, Normocephalic  EYES: EOMI, conjunctiva and sclera clear  ENMT: Moist mucous membranes  NECK: Supple, No JVD  NERVOUS SYSTEM:  Alert & Oriented X3, Good concentration  CHEST/LUNG: Clear to auscultation bilaterally  HEART: Regular rate and rhythm; Normal S1 and S2  ABDOMEN: Soft, Nontender, Nondistended; Bowel sounds present  EXTREMITIES:  left wrist and left hip dressing c/d/i; 2+ Peripheral Pulses  PSYCH: normal mood and affect    Consultant(s) Notes Reviewed:  [x ] YES  [ ] NO  Care Discussed with Consultants/Other Providers [ x] YES  [ ] NO    Investigations:                        8.5    10.18 )-----------( 328      ( 21 Dec 2024 06:55 )             26.6     12-21    137  |  104  |  18  ----------------------------<  97  3.4[L]   |  22  |  0.93    Ca    8.1[L]      21 Dec 2024 06:55

## 2024-12-21 NOTE — PROGRESS NOTE ADULT - SUBJECTIVE AND OBJECTIVE BOX
Ortho Note    Pt comfortable without complaints, pain controlled  Denies CP, SOB, N/V, numbness/tingling     Vital Signs Last 24 Hrs  T(C): 36.4 (12-21-24 @ 09:00), Max: 36.6 (12-21-24 @ 05:50)  T(F): 97.6 (12-21-24 @ 09:00), Max: 97.8 (12-21-24 @ 05:50)  HR: 79 (12-21-24 @ 09:00) (79 - 81)  BP: 159/80 (12-21-24 @ 09:00) (141/75 - 159/80)  BP(mean): --  RR: 16 (12-21-24 @ 09:00) (16 - 16)  SpO2: 98% (12-21-24 @ 09:00) (94% - 98%)  I&O's Summary    20 Dec 2024 07:01  -  21 Dec 2024 07:00  --------------------------------------------------------  IN: 0 mL / OUT: 300 mL / NET: -300 mL          General: Pt Alert and oriented, NAD  LUE:   Dressing C/D/I: sugartong splint LUE, carterblock pillow applied   Sensation: SILT  Motor: wiggle fingers    LLE:  SILT  Motor 5/5 TA/GS/EHL/FHL  Pulses 2+ marked                        8.5    10.18 )-----------( 328      ( 21 Dec 2024 06:55 )             26.6     12-21    137  |  104  |  18  ----------------------------<  97  3.4[L]   |  22  |  0.93    Ca    8.1[L]      21 Dec 2024 06:55      A/P: 79yFemale s/p Left Hip Periprosthetic ORIF by Dr. BRENDEN Loja on 12-16 and DRF ORIF 12/12  - Stable  - Pain Control  - DVT ppx: SCDs, [ ]F/u when to resum Lvx  - Post op abx: Ancef  - WBS: Protected weight bearing  LLE posterior hip precautions, NWB L UE  -F/u C-dif r/o  Dispo : [facility]    Ortho Pager [phone]

## 2024-12-22 LAB
ANION GAP SERPL CALC-SCNC: 9 MMOL/L — SIGNIFICANT CHANGE UP (ref 5–17)
BUN SERPL-MCNC: 17 MG/DL — SIGNIFICANT CHANGE UP (ref 7–23)
C DIFF GDH STL QL: SIGNIFICANT CHANGE UP
C DIFF GDH STL QL: SIGNIFICANT CHANGE UP
CALCIUM SERPL-MCNC: 8.1 MG/DL — LOW (ref 8.4–10.5)
CHLORIDE SERPL-SCNC: 104 MMOL/L — SIGNIFICANT CHANGE UP (ref 96–108)
CO2 SERPL-SCNC: 21 MMOL/L — LOW (ref 22–31)
CREAT SERPL-MCNC: 0.89 MG/DL — SIGNIFICANT CHANGE UP (ref 0.5–1.3)
EGFR: 66 ML/MIN/1.73M2 — SIGNIFICANT CHANGE UP
GLUCOSE SERPL-MCNC: 143 MG/DL — HIGH (ref 70–99)
HCT VFR BLD CALC: 26.7 % — LOW (ref 34.5–45)
HGB BLD-MCNC: 8.8 G/DL — LOW (ref 11.5–15.5)
MCHC RBC-ENTMCNC: 28.9 PG — SIGNIFICANT CHANGE UP (ref 27–34)
MCHC RBC-ENTMCNC: 33 G/DL — SIGNIFICANT CHANGE UP (ref 32–36)
MCV RBC AUTO: 87.5 FL — SIGNIFICANT CHANGE UP (ref 80–100)
NRBC # BLD: 0 /100 WBCS — SIGNIFICANT CHANGE UP (ref 0–0)
PLATELET # BLD AUTO: 364 K/UL — SIGNIFICANT CHANGE UP (ref 150–400)
POTASSIUM SERPL-MCNC: 3.9 MMOL/L — SIGNIFICANT CHANGE UP (ref 3.5–5.3)
POTASSIUM SERPL-SCNC: 3.9 MMOL/L — SIGNIFICANT CHANGE UP (ref 3.5–5.3)
RBC # BLD: 3.05 M/UL — LOW (ref 3.8–5.2)
RBC # FLD: 14.6 % — HIGH (ref 10.3–14.5)
SODIUM SERPL-SCNC: 134 MMOL/L — LOW (ref 135–145)
WBC # BLD: 23.02 K/UL — HIGH (ref 3.8–10.5)
WBC # FLD AUTO: 23.02 K/UL — HIGH (ref 3.8–10.5)

## 2024-12-22 PROCEDURE — 99233 SBSQ HOSP IP/OBS HIGH 50: CPT

## 2024-12-22 RX ORDER — VANCOMYCIN HYDROCHLORIDE 5 G/100ML
125 INJECTION, POWDER, LYOPHILIZED, FOR SOLUTION INTRAVENOUS EVERY 6 HOURS
Refills: 0 | Status: DISCONTINUED | OUTPATIENT
Start: 2024-12-22 | End: 2024-12-26

## 2024-12-22 RX ADMIN — ACETAMINOPHEN 1000 MILLIGRAM(S): 80 SOLUTION/ DROPS ORAL at 01:15

## 2024-12-22 RX ADMIN — CARBIDOPA 25 MILLIGRAM(S): 25 TABLET ORAL at 13:06

## 2024-12-22 RX ADMIN — Medication 5 MILLIGRAM(S): at 13:05

## 2024-12-22 RX ADMIN — Medication 5 MILLIGRAM(S): at 09:29

## 2024-12-22 RX ADMIN — LOSARTAN POTASSIUM 100 MILLIGRAM(S): 100 TABLET, FILM COATED ORAL at 05:13

## 2024-12-22 RX ADMIN — ACETAMINOPHEN 1000 MILLIGRAM(S): 80 SOLUTION/ DROPS ORAL at 00:13

## 2024-12-22 RX ADMIN — LEVODOPA AND CARBIDOPA 2 CAPSULE(S): 145; 36.25 CAPSULE, EXTENDED RELEASE ORAL at 13:07

## 2024-12-22 RX ADMIN — LEVODOPA AND CARBIDOPA 2 CAPSULE(S): 145; 36.25 CAPSULE, EXTENDED RELEASE ORAL at 17:31

## 2024-12-22 RX ADMIN — CARBIDOPA 25 MILLIGRAM(S): 25 TABLET ORAL at 09:29

## 2024-12-22 RX ADMIN — CARBIDOPA 25 MILLIGRAM(S): 25 TABLET ORAL at 17:31

## 2024-12-22 RX ADMIN — ENOXAPARIN SODIUM 30 MILLIGRAM(S): 60 INJECTION INTRAVENOUS; SUBCUTANEOUS at 21:26

## 2024-12-22 RX ADMIN — Medication 2000 UNIT(S): at 13:06

## 2024-12-22 RX ADMIN — LEVODOPA AND CARBIDOPA 2 CAPSULE(S): 145; 36.25 CAPSULE, EXTENDED RELEASE ORAL at 09:30

## 2024-12-22 RX ADMIN — VANCOMYCIN HYDROCHLORIDE 125 MILLIGRAM(S): 5 INJECTION, POWDER, LYOPHILIZED, FOR SOLUTION INTRAVENOUS at 17:30

## 2024-12-22 NOTE — PROGRESS NOTE ADULT - SUBJECTIVE AND OBJECTIVE BOX
Patient is a 79y old  Female who presents with a chief complaint of WRIST FRACTURE (16 Dec 2024 13:49).    Patient seen and examined today. She denies abdominal pain, vomiting, fever, chills, dyspnea, dizziness, chest pain, bleeding symptoms. I explained to her which she is in insolation for C diff and she expressed understanding. She reports having had a bowel movement today and yesterday and did not have watery stools.    Allergies    penicillin (Swelling)  sulfa drugs (Nausea)  codeine (Nausea)    MEDICATIONS  (STANDING):  carbidopa 25 milliGRAM(s) Oral <User Schedule>  carbidopa 23.75 mG/levodopa 95 mG ER 2 Capsule(s) Oral <User Schedule>  cholecalciferol 2000 Unit(s) Oral daily  enoxaparin Injectable 30 milliGRAM(s) SubCutaneous every 24 hours  influenza  Vaccine (HIGH DOSE) 0.5 milliLiter(s) IntraMuscular once  losartan 100 milliGRAM(s) Oral daily  povidone iodine 10% Nasal Swab 1 Application(s) Both Nostrils once  selegiline Oral Tab/Cap 5 milliGRAM(s) Oral <User Schedule>    MEDICATIONS  (PRN):  acetaminophen     Tablet .. 1000 milliGRAM(s) Oral every 8 hours PRN Mild Pain (1 - 3)  HYDROmorphone  Injectable 0.5 milliGRAM(s) IV Push every 3 hours PRN Breakthrough pain  HYDROmorphone  Injectable 0.5 milliGRAM(s) IV Push every 15 minutes PRN breakthrough pain in the PACU  magnesium hydroxide Suspension 30 milliLiter(s) Oral daily PRN Constipation  ondansetron    Tablet 4 milliGRAM(s) Oral every 6 hours PRN Nausea and/or Vomiting  oxyCODONE    IR 5 milliGRAM(s) Oral every 6 hours PRN Moderate Pain (4 - 6)  oxyCODONE    IR 10 milliGRAM(s) Oral every 6 hours PRN Severe Pain (7 - 10)    Vital Signs Last 24 Hrs  T(C): 37.3 (12-22-24 @ 09:00), Max: 37.5 (12-21-24 @ 19:23)  T(F): 99.2 (12-22-24 @ 09:00), Max: 99.5 (12-21-24 @ 19:23)  HR: 95 (12-22-24 @ 09:00) (63 - 99)  BP: 127/58 (12-22-24 @ 09:00) (114/56 - 171/80)  BP(mean): --  RR: 17 (12-22-24 @ 09:00) (16 - 18)  SpO2: 94% (12-22-24 @ 09:00) (94% - 97%)      I&O's Summary    21 Dec 2024 07:01  -  22 Dec 2024 07:00  --------------------------------------------------------  IN: 0 mL / OUT: 925 mL / NET: -925 mL    22 Dec 2024 07:01  -  22 Dec 2024 10:56  --------------------------------------------------------  IN: 0 mL / OUT: 1 mL / NET: -1 mL      Oxygen Saturation Index= Unable to calculate   [Based on FiO2 = Unknown, SpO2 = 94(12/22/2024 09:00), MAP = Unknown]    PHYSICAL EXAM:  GENERAL: NAD  HEAD:  Atraumatic, Normocephalic  EYES: EOMI, conjunctiva and sclera clear  ENMT: Moist mucous membranes  NECK: Supple, No JVD  NERVOUS SYSTEM:  Alert & Oriented X3, Good concentration  CHEST/LUNG: Clear to auscultation bilaterally  HEART: Regular rate and rhythm; Normal S1 and S2  ABDOMEN: Soft, Nontender, Nondistended; Bowel sounds present  EXTREMITIES:  left wrist and left hip dressing c/d/i; 2+ Peripheral Pulses  PSYCH: normal mood and affect    Consultant(s) Notes Reviewed:  [x ] YES  [ ] NO  Care Discussed with Consultants/Other Providers [ x] YES  [ ] NO      Investigations:                        8.8    23.02 )-----------( 364      ( 22 Dec 2024 05:30 )             26.7     12-22    134[L]  |  104  |  17  ----------------------------<  143[H]  3.9   |  21[L]  |  0.89    Ca    8.1[L]      22 Dec 2024 05:30

## 2024-12-22 NOTE — PROGRESS NOTE ADULT - ASSESSMENT
Ms. Alma Osorio is a 79/F with Parkinson disease, HTN, prior left hemiarthroplasty in 2021 and a recent admission (11/25/24-11/26/24) after a mechanical fall subsequently found to have L hip periprosthetic fracture and L distal radial fracture who presented back to [facility] per outpatient surgeon request for left distal radius open reduction and internal fixation surgery. She underwent closed reduction of the left distal radius. She is now s/p periprosthetic left hip ORIF.    Recommendations:    #Left distal radius fracture  #Left hip periprosthetic fracture  #Post op status  - Provide adequate analgesia, Incentive spirometry, mobilize with fall precautions, bowel regimen, DVT prophylaxis  - PT/OT    #C diff infection, first recurrence  - last C diff in 2021 reportedly  - WBC 23, Tmax 99.5F, no abdominal pain   - per guidelines options to treat first recurrence are the following:  *Fidaxomicin  200 mg orally twice daily for 10 days, OR  200 mg orally twice daily for 5 days, followed by once every other day for 20 days    *Vancomycin in a tapered and pulsed regimen, for example:  125 mg orally 4 times daily for 10 to 14 days, then  125 mg orally 2 times daily for 7 days, then  125 mg orally once daily for 7 days, then  125 mg orally every 2 to 3 days for 2 to 8 weeks    - I spoke with the orthopedics team to ensure treatment started ASAP based on availability in the hospital formulary and to consult ID given severity of past C diff infection and recurrence with a WBC 23    #Hypokalemia, improved  - monitor  - can check Mg level     #Hyponatremia, likely SIADH 2/2 pain  - fluid restriction of 1.5L/day  - pain control    #Anemia, acute on chronic iron deficiency anemia  #Thrombocytosis, possibly reactive, improved  - Tsat 16%, ferritin 252, B12 848, haptoglobin 244  - Monitor Hgb  - Transfuse for Hgb < 7  - Ensure Type and Screen available  - can offer ferrous sulfate 325 mg every other day with bowel regimen on DC    #Vit D deficiency  - on Vit D supplementation    #HTN (hypertension)  - on Losartan 100 mg daily, ensure BP parameters  - BP monitoring    #Parkinson disease  - on selegiline 5mg BID, carbidopa levodopa 23.75-95 XR 2 tabs TID, carbidopa 25 1 tab TID, Domperidone (patient reports taking this to address the Parkinson's meds side effects, unsure if she also has gastroparesis)  - patient advised to have someone from home bring Domperidone which is NF in the hospital so she can get it inpatient if possible  - PT/OT    DVT ppx: Lovenox    Feel free to reach out for any questions. Recommendations discussed with primary team.

## 2024-12-23 LAB
ALBUMIN SERPL ELPH-MCNC: 2.5 G/DL — LOW (ref 3.3–5)
ALP SERPL-CCNC: 152 U/L — HIGH (ref 40–120)
ALT FLD-CCNC: <5 U/L — LOW (ref 10–45)
ANION GAP SERPL CALC-SCNC: 11 MMOL/L — SIGNIFICANT CHANGE UP (ref 5–17)
AST SERPL-CCNC: 15 U/L — SIGNIFICANT CHANGE UP (ref 10–40)
BILIRUB SERPL-MCNC: 0.3 MG/DL — SIGNIFICANT CHANGE UP (ref 0.2–1.2)
BUN SERPL-MCNC: 14 MG/DL — SIGNIFICANT CHANGE UP (ref 7–23)
CALCIUM SERPL-MCNC: 8.2 MG/DL — LOW (ref 8.4–10.5)
CHLORIDE SERPL-SCNC: 104 MMOL/L — SIGNIFICANT CHANGE UP (ref 96–108)
CO2 SERPL-SCNC: 22 MMOL/L — SIGNIFICANT CHANGE UP (ref 22–31)
CREAT SERPL-MCNC: 0.91 MG/DL — SIGNIFICANT CHANGE UP (ref 0.5–1.3)
EGFR: 64 ML/MIN/1.73M2 — SIGNIFICANT CHANGE UP
GLUCOSE SERPL-MCNC: 102 MG/DL — HIGH (ref 70–99)
HCT VFR BLD CALC: 27.6 % — LOW (ref 34.5–45)
HGB BLD-MCNC: 9 G/DL — LOW (ref 11.5–15.5)
MCHC RBC-ENTMCNC: 29 PG — SIGNIFICANT CHANGE UP (ref 27–34)
MCHC RBC-ENTMCNC: 32.6 G/DL — SIGNIFICANT CHANGE UP (ref 32–36)
MCV RBC AUTO: 89 FL — SIGNIFICANT CHANGE UP (ref 80–100)
NRBC # BLD: 0 /100 WBCS — SIGNIFICANT CHANGE UP (ref 0–0)
PLATELET # BLD AUTO: 404 K/UL — HIGH (ref 150–400)
POTASSIUM SERPL-MCNC: 3.9 MMOL/L — SIGNIFICANT CHANGE UP (ref 3.5–5.3)
POTASSIUM SERPL-SCNC: 3.9 MMOL/L — SIGNIFICANT CHANGE UP (ref 3.5–5.3)
PROT SERPL-MCNC: 5.3 G/DL — LOW (ref 6–8.3)
RBC # BLD: 3.1 M/UL — LOW (ref 3.8–5.2)
RBC # FLD: 14.7 % — HIGH (ref 10.3–14.5)
SODIUM SERPL-SCNC: 137 MMOL/L — SIGNIFICANT CHANGE UP (ref 135–145)
WBC # BLD: 16.8 K/UL — HIGH (ref 3.8–10.5)
WBC # FLD AUTO: 16.8 K/UL — HIGH (ref 3.8–10.5)

## 2024-12-23 PROCEDURE — 99222 1ST HOSP IP/OBS MODERATE 55: CPT

## 2024-12-23 PROCEDURE — 99233 SBSQ HOSP IP/OBS HIGH 50: CPT

## 2024-12-23 RX ORDER — LACTOBACILLUS ACIDOPHILUS/PECT 75 MM-100
1 CAPSULE ORAL DAILY
Refills: 0 | Status: DISCONTINUED | OUTPATIENT
Start: 2024-12-23 | End: 2024-12-26

## 2024-12-23 RX ORDER — B COMPLEX, C NO.20/FOLIC ACID 1 MG
1 CAPSULE ORAL DAILY
Refills: 0 | Status: DISCONTINUED | OUTPATIENT
Start: 2024-12-23 | End: 2024-12-26

## 2024-12-23 RX ADMIN — ACETAMINOPHEN 1000 MILLIGRAM(S): 80 SOLUTION/ DROPS ORAL at 12:12

## 2024-12-23 RX ADMIN — Medication 5 MILLIGRAM(S): at 13:51

## 2024-12-23 RX ADMIN — LOSARTAN POTASSIUM 100 MILLIGRAM(S): 100 TABLET, FILM COATED ORAL at 05:55

## 2024-12-23 RX ADMIN — VANCOMYCIN HYDROCHLORIDE 125 MILLIGRAM(S): 5 INJECTION, POWDER, LYOPHILIZED, FOR SOLUTION INTRAVENOUS at 00:22

## 2024-12-23 RX ADMIN — CARBIDOPA 25 MILLIGRAM(S): 25 TABLET ORAL at 17:29

## 2024-12-23 RX ADMIN — VANCOMYCIN HYDROCHLORIDE 125 MILLIGRAM(S): 5 INJECTION, POWDER, LYOPHILIZED, FOR SOLUTION INTRAVENOUS at 11:02

## 2024-12-23 RX ADMIN — LEVODOPA AND CARBIDOPA 2 CAPSULE(S): 145; 36.25 CAPSULE, EXTENDED RELEASE ORAL at 07:21

## 2024-12-23 RX ADMIN — VANCOMYCIN HYDROCHLORIDE 125 MILLIGRAM(S): 5 INJECTION, POWDER, LYOPHILIZED, FOR SOLUTION INTRAVENOUS at 23:39

## 2024-12-23 RX ADMIN — VANCOMYCIN HYDROCHLORIDE 125 MILLIGRAM(S): 5 INJECTION, POWDER, LYOPHILIZED, FOR SOLUTION INTRAVENOUS at 17:29

## 2024-12-23 RX ADMIN — Medication 2000 UNIT(S): at 11:02

## 2024-12-23 RX ADMIN — ENOXAPARIN SODIUM 30 MILLIGRAM(S): 60 INJECTION INTRAVENOUS; SUBCUTANEOUS at 22:30

## 2024-12-23 RX ADMIN — VANCOMYCIN HYDROCHLORIDE 125 MILLIGRAM(S): 5 INJECTION, POWDER, LYOPHILIZED, FOR SOLUTION INTRAVENOUS at 05:56

## 2024-12-23 RX ADMIN — CARBIDOPA 25 MILLIGRAM(S): 25 TABLET ORAL at 07:20

## 2024-12-23 RX ADMIN — LEVODOPA AND CARBIDOPA 2 CAPSULE(S): 145; 36.25 CAPSULE, EXTENDED RELEASE ORAL at 17:30

## 2024-12-23 RX ADMIN — CARBIDOPA 25 MILLIGRAM(S): 25 TABLET ORAL at 13:50

## 2024-12-23 RX ADMIN — LEVODOPA AND CARBIDOPA 2 CAPSULE(S): 145; 36.25 CAPSULE, EXTENDED RELEASE ORAL at 13:50

## 2024-12-23 RX ADMIN — ACETAMINOPHEN 1000 MILLIGRAM(S): 80 SOLUTION/ DROPS ORAL at 11:12

## 2024-12-23 RX ADMIN — Medication 5 MILLIGRAM(S): at 07:20

## 2024-12-23 NOTE — PROGRESS NOTE ADULT - SUBJECTIVE AND OBJECTIVE BOX
Ortho Note    Pt comfortable without complaints, pain controlled  Denies CP, SOB, N/V, numbness/tingling     Vital Signs Last 24 Hrs  T(C): 36.6 (12-23-24 @ 05:45), Max: 36.6 (12-23-24 @ 05:45)  T(F): 97.9 (12-23-24 @ 05:45), Max: 97.9 (12-23-24 @ 05:45)  HR: 86 (12-23-24 @ 05:45) (86 - 86)  BP: 147/74 (12-23-24 @ 05:45) (147/74 - 147/74)  BP(mean): --  RR: 18 (12-23-24 @ 05:45) (18 - 18)  SpO2: 95% (12-23-24 @ 05:45) (95% - 95%)  I&O's Summary    22 Dec 2024 07:01  -  23 Dec 2024 07:00  --------------------------------------------------------  IN: 0 mL / OUT: 452 mL / NET: -452 mL          General: Pt Alert and oriented, NAD  LUE:   Dressing C/D/I: sugartong splint LUE, carterblock pillow applied   Sensation: SILT  Motor: wiggle fingers    LLE:  SILT  Motor 5/5 TA/GS/EHL/FHL  Pulses 2+ marked                          9.0    16.80 )-----------( 404      ( 23 Dec 2024 05:30 )             27.6     12-23    137  |  104  |  14  ----------------------------<  102[H]  3.9   |  22  |  0.91    Ca    8.2[L]      23 Dec 2024 05:30    TPro  5.3[L]  /  Alb  2.5[L]  /  TBili  0.3  /  DBili  x   /  AST  15  /  ALT  <5[L]  /  AlkPhos  152[H]  12-23      A/P: 79yFemale s/p Left Hip Periprosthetic ORIF by Dr. BRENDEN Loja on 12-16 and DRF ORIF 12/12  - Stable  - Pain Control  - DVT ppx: SCDs, [ ]F/u when to resum Lvx  - Post op abx: Ancef  - WBS: Protected weight bearing  LLE posterior hip precautions, NWB L UE  - C-diff postiive - started on Vancomycin  - [ ]ID consult  Dispo : [facility]    Ortho Pager [phone]

## 2024-12-23 NOTE — CONSULT NOTE ADULT - ASSESSMENT
79-year-old with prior history of Parkinson's (dx 2015), hypertension and mechanical fall on 11/24 resulting in L DRF and L periprosthetic hip fracture presenting for scheduled ORIF of L distal radius and L hip s/p L wrist ORIF 12/12 and L hip periprosthetic ORIF 12/16. She received periop cefazolin. She developed diarrhea on 12/19 and tested positive for c. diff toxin 12/20. She does have hx of c. diff s/p course of fidaxomicin in 2021. She is on PO vanc and diarrhea is improving - has not had for 24 hours. She is afebrile, WBC 16 (23).     Suggest:  -trend WBC, add diff to CBC   -monitor diarrhea   -continue PO vancomycin 125mg Q6     Team 2 will follow you.    Case d/w primary team.  Final recommendation pending attending note.    Missy Silveira, Infectious Diseases PA  Please reach out for any questions 9 am-5pm.   For evenings and weekends, please call the ID physician on call.

## 2024-12-23 NOTE — PROGRESS NOTE ADULT - SUBJECTIVE AND OBJECTIVE BOX
Patient is a 79y old  Female who presents with a chief complaint of WRIST FRACTURE (16 Dec 2024 13:49).    Patient seen and examined today. She denies abdominal pain, fever, vomiting. She denies having had diarrhea overnight.     Allergies    penicillin (Swelling)  sulfa drugs (Nausea)  codeine (Nausea)    Last Bowel Movement: 22-Dec-2024 (12-22-24 @ 20:45)    MEDICATIONS  (STANDING):  carbidopa 25 milliGRAM(s) Oral <User Schedule>  carbidopa 23.75 mG/levodopa 95 mG ER 2 Capsule(s) Oral <User Schedule>  cholecalciferol 2000 Unit(s) Oral daily  enoxaparin Injectable 30 milliGRAM(s) SubCutaneous every 24 hours  influenza  Vaccine (HIGH DOSE) 0.5 milliLiter(s) IntraMuscular once  losartan 100 milliGRAM(s) Oral daily  povidone iodine 10% Nasal Swab 1 Application(s) Both Nostrils once  selegiline Oral Tab/Cap 5 milliGRAM(s) Oral <User Schedule>  vancomycin    Solution 125 milliGRAM(s) Oral every 6 hours    MEDICATIONS  (PRN):  acetaminophen     Tablet .. 1000 milliGRAM(s) Oral every 8 hours PRN Mild Pain (1 - 3)  HYDROmorphone  Injectable 0.5 milliGRAM(s) IV Push every 3 hours PRN Breakthrough pain  HYDROmorphone  Injectable 0.5 milliGRAM(s) IV Push every 15 minutes PRN breakthrough pain in the PACU  magnesium hydroxide Suspension 30 milliLiter(s) Oral daily PRN Constipation  ondansetron    Tablet 4 milliGRAM(s) Oral every 6 hours PRN Nausea and/or Vomiting  oxyCODONE    IR 5 milliGRAM(s) Oral every 6 hours PRN Moderate Pain (4 - 6)  oxyCODONE    IR 10 milliGRAM(s) Oral every 6 hours PRN Severe Pain (7 - 10)    Vital Signs Last 24 Hrs  T(C): 36.6 (12-23-24 @ 05:45), Max: 37.6 (12-22-24 @ 13:00)  T(F): 97.9 (12-23-24 @ 05:45), Max: 99.6 (12-22-24 @ 13:00)  HR: 86 (12-23-24 @ 05:45) (86 - 100)  BP: 147/74 (12-23-24 @ 05:45) (114/73 - 154/74)  BP(mean): --  RR: 18 (12-23-24 @ 05:45) (16 - 18)  SpO2: 95% (12-23-24 @ 05:45) (94% - 98%)      I&O's Summary    22 Dec 2024 07:01  -  23 Dec 2024 07:00  --------------------------------------------------------  IN: 0 mL / OUT: 452 mL / NET: -452 mL      Oxygen Saturation Index= Unable to calculate   [Based on FiO2 = Unknown, SpO2 = 95(12/23/2024 05:45), MAP = Unknown]    PHYSICAL EXAM:  GENERAL: NAD  HEAD:  Atraumatic, Normocephalic  EYES: EOMI, conjunctiva and sclera clear  ENMT: Moist mucous membranes  NECK: Supple, No JVD  NERVOUS SYSTEM:  Alert & Oriented X3, Good concentration  CHEST/LUNG: Clear to auscultation bilaterally  HEART: Regular rate and rhythm; Normal S1 and S2  ABDOMEN: Soft, Nontender, Nondistended; Bowel sounds present  EXTREMITIES:  left wrist and left hip dressing c/d/i; 2+ Peripheral Pulses  PSYCH: normal mood and affect    Consultant(s) Notes Reviewed:  [x ] YES  [ ] NO  Care Discussed with Consultants/Other Providers [ x] YES  [ ] NO      Investigations:                        9.0    16.80 )-----------( 404      ( 23 Dec 2024 05:30 )             27.6     12-23    137  |  104  |  14  ----------------------------<  102[H]  3.9   |  22  |  0.91    Ca    8.2[L]      23 Dec 2024 05:30    TPro  5.3[L]  /  Alb  2.5[L]  /  TBili  0.3  /  DBili  x   /  AST  15  /  ALT  <5[L]  /  AlkPhos  152[H]  12-23

## 2024-12-23 NOTE — CONSULT NOTE ADULT - CONSULT REASON
c. diff
Pre op clearance for Left distal radius open reduction and internal fixation surgery
left distal radius fracture, left hip periprosthetic fracture

## 2024-12-23 NOTE — PROGRESS NOTE ADULT - SUBJECTIVE AND OBJECTIVE BOX
Ortho Note    Pt comfortable without complaints, pain controlled. Pt stated that she has not had a loose bowel movement today. Tolerating diet well.   Denies CP, SOB, N/V, numbness/tingling     Vital Signs Last 24 Hrs  T(C): 36.6 (12-23-24 @ 10:30), Max: 36.6 (12-23-24 @ 10:30)  T(F): 97.9 (12-23-24 @ 10:30), Max: 97.9 (12-23-24 @ 10:30)  HR: 98 (12-23-24 @ 10:30) (98 - 98)  BP: 139/72 (12-23-24 @ 10:30) (139/72 - 139/72)  BP(mean): --  RR: 16 (12-23-24 @ 10:30) (16 - 16)  SpO2: 97% (12-23-24 @ 10:30) (97% - 97%)  I&O's Summary    22 Dec 2024 07:01  -  23 Dec 2024 07:00  --------------------------------------------------------  IN: 0 mL / OUT: 452 mL / NET: -452 mL        General: Pt Alert and oriented, NAD    LLE:  DSG C/D/I - Aquacel   Pulses: 2+ DP skin warm, dry, well perfused b/l  Sensation: SILT L3-S1 b/l  Motor: EHL/FHL/TA/GS 5/5 b/l    LUE:   DSG: Sugar-tong splint C/D/I and gemma block in place  Pulses: 2+ radial. skin warm, dry, well perfused   Sensation: SILT to all 5 digits  Motor: wiggling fingers                          9.0    16.80 )-----------( 404      ( 23 Dec 2024 05:30 )             27.6     12-23    137  |  104  |  14  ----------------------------<  102[H]  3.9   |  22  |  0.91    Ca    8.2[L]      23 Dec 2024 05:30    TPro  5.3[L]  /  Alb  2.5[L]  /  TBili  0.3  /  DBili  x   /  AST  15  /  ALT  <5[L]  /  AlkPhos  152[H]  12-23      A/P: 79yFemale POD# s/p Left Hip Periprosthetic ORIF by Dr. BRENDEN Loja on 12-16 and DRDOMINIQUE ORIF 12/12  - Stable. Continue to appreciate medicine recommendations  - Continue Vancomycin for C-diff treatment   - Consult ID for recommendations   - Aquacel dressing changed with new Aquacel   - Pain Control  - DVT ppx: Lvx, SCDs  - IS, EHOB for meals  - PT, WBS: NWB to LUE in splint and gemma block. WBAT to LLE  - Add on multivitamin and probiotic per nutrition  - Dispo: [facility] pending medical optimization     Ortho Pager [phone] Ortho Note    Pt comfortable without complaints, pain controlled. Pt stated that she has not had a loose bowel movement today. Tolerating diet well.   Denies CP, SOB, N/V, numbness/tingling     Vital Signs Last 24 Hrs  T(C): 36.6 (12-23-24 @ 10:30), Max: 36.6 (12-23-24 @ 10:30)  T(F): 97.9 (12-23-24 @ 10:30), Max: 97.9 (12-23-24 @ 10:30)  HR: 98 (12-23-24 @ 10:30) (98 - 98)  BP: 139/72 (12-23-24 @ 10:30) (139/72 - 139/72)  BP(mean): --  RR: 16 (12-23-24 @ 10:30) (16 - 16)  SpO2: 97% (12-23-24 @ 10:30) (97% - 97%)  I&O's Summary    22 Dec 2024 07:01  -  23 Dec 2024 07:00  --------------------------------------------------------  IN: 0 mL / OUT: 452 mL / NET: -452 mL        General: Pt Alert and oriented, NAD    LLE:  DSG C/D/I - Aquacel   Pulses: 2+ DP skin warm, dry, well perfused b/l  Sensation: SILT L3-S1 b/l  Motor: EHL/FHL/TA/GS 5/5 b/l    LUE:   DSG: Sugar-tong splint C/D/I and gemma block in place  Pulses: 2+ radial. skin warm, dry, well perfused   Sensation: SILT to all 5 digits  Motor: wiggling fingers                          9.0    16.80 )-----------( 404      ( 23 Dec 2024 05:30 )             27.6     12-23    137  |  104  |  14  ----------------------------<  102[H]  3.9   |  22  |  0.91    Ca    8.2[L]      23 Dec 2024 05:30    TPro  5.3[L]  /  Alb  2.5[L]  /  TBili  0.3  /  DBili  x   /  AST  15  /  ALT  <5[L]  /  AlkPhos  152[H]  12-23      A/P: 79yFemale POD# s/p Left Hip Periprosthetic ORIF by Dr. BRENDEN Loja on 12-16 and DRDOMINIQUE ORIF 12/12  - Stable. Continue to appreciate medicine recommendations  - Continue Vancomycin for C-diff treatment   - Consult ID for recommendations   - Aquacel dressing changed - left C/D/I  - Pain Control  - DVT ppx: Lvx, SCDs  - IS, EHOB for meals  - PT, WBS: NWB to LUE in splint and gemma block. Protected WB to LLE with PHP  - Add on multivitamin and probiotic per nutrition  - Dispo: [facility] pending medical optimization     Ortho Pager [phone]

## 2024-12-23 NOTE — CONSULT NOTE ADULT - REASON FOR ADMISSION
L distal radius ORIF and L hip periprosthetic ORIF
Left distal radius open reduction and internal fixation surgery

## 2024-12-23 NOTE — CONSULT NOTE ADULT - NS ATTEND AMEND GEN_ALL_CORE FT
78 yo F with HTN, Parkinson’s disease, s/p L hemiarthroplasty (2021), h/o C diff 2021 admitted 12/11 with L distal radial fracture.  ID consult for recurrent C diff.  She had been admitted on 11/25 following fall.  She was found to have L distal radial fracture and a periprosthetic fracture of the proximal femur in the trochanteric and subtrochanteric region.  She underwent closed reduction of radial fracture by Ortho, no intervention for hip at that time.  She was discharged to [place] on 11/26. Repeat imaging on 12/11 showed angulation and impaction at site of fracture at the distal radius.  The femur fracture was now comminuted with increased displacement of fracture fragments.  She was admitted for further management.  She was afebrile, VSS, labs with WBC 14.3 without shift, Hg 8.8, Hct 28.0, BUN 25, Cr 1.14.  On 12/12, she underwent closed reduction of distal radial fracture, was found to have L ulcer to dorsal 2/3 carpal-metacarpal joint with non-erythematous ulcer/abrasion.  On 12/16, she underwent ORIF of periprosthetic hip fracture.  She received cefazolin intraoperatively and was then given two additional doses.  She became tachycardic on 12/17 (HR 90s -100s).  Overnight into 12/20, she began having diarrhea.  Bowel regimen was d/aura.  Her diarrhea was noted to have resolved on 12/21.  On 12/22, she had Tm 99.6 with WBC 23K, no diff sent. Stool dated 12/20 was positive for C diff by toxin.  She was started on vancomycin.  Per MsJostin Jo, her prior episode of C diff was at time of hip arthroplasty and she was very severely ill, treated with the “expensive medicine” – likely fidaxomicin.  No fever, chills, abd pain.  On exam, she is frail appearing, oriented, has normoactive BS, is nontender, has LUE wrapped, dressing L femur c/d/i.  Imp:  2nd episode of C diff, likely related to perioperative cefazolin.  She has not had diarrhea since stopping bowel regimen but had marked leukocytosis and stool from 12/20 was positive for toxin – would treat. Would continue to trend WBC with diff, document stool output, continue vancomycin as above.  Will follow with you, team 2.

## 2024-12-23 NOTE — CONSULT NOTE ADULT - SUBJECTIVE AND OBJECTIVE BOX
INFECTIOUS DISEASES INITIAL CONSULT NOTE    HPI:  79-year-old with prior history of Parkinson's (dx 2015), hypertension and mechanical fall on 11/24 resulting in L DRF and L periprosthetic hip fracture presenting for scheduled ORIF of L distal radius and L hip. ID consulted for C.diff.   Pt was initially seen and admitted 11/25 after fall and was found to have L DRF and L periprosthetic hip fracture. She was then sent to [place] rehab, with follow-up to see Dr. Loja. Pt had repeat imaging with worsening of fracture. Plan at that time was for admission for L distal radius ORIF and L hip surgery at a later date. Patient at baseline ambulates without assistive device. Ambulates with walker since injury. Upon arrival, afebrile with WBC 14 (no shift). She is s/p L wrist ORIF 12/12 and L hip periprosthetic ORIF 12/16 -- received periop cefazolin. On 12/19, she had 6 episodes of watery stool. She had been on senna and miralax which was stopped 12/20. C diff was sent 12/20 and toxin positive. She was started on PO vanco. She denies fevers, chills, N/V, abd pain, dysuria. She has not had diarrhea in the last 24 hours - has one pasty BM today and 2 pasty stools yesterday. She is also s/p erythromycin suspension for gastroparesis 12/18-12/19. WBC 23 on 12/22; 16 today. Has had C.diff before in 2021 - states she was admitted to [facility]- she took course of fidaxomicin with resolution (her son had wanted to pay for the more expensive treatment at that time.            PAST MEDICAL & SURGICAL HISTORY:  Parkinson disease      Hypertension            Review of Systems:   Constitutional, eyes, ENT, cardiovascular, respiratory, gastrointestinal, genitourinary, integumentary, neurological, psychiatric and heme/lymph are otherwise negative other than noted above       ANTIBIOTICS:  MEDICATIONS  (STANDING):  carbidopa 25 milliGRAM(s) Oral <User Schedule>  carbidopa 23.75 mG/levodopa 95 mG ER 2 Capsule(s) Oral <User Schedule>  cholecalciferol 2000 Unit(s) Oral daily  enoxaparin Injectable 30 milliGRAM(s) SubCutaneous every 24 hours  influenza  Vaccine (HIGH DOSE) 0.5 milliLiter(s) IntraMuscular once  lactobacillus acidophilus 1 Tablet(s) Oral daily  losartan 100 milliGRAM(s) Oral daily  multivitamin 1 Tablet(s) Oral daily  povidone iodine 10% Nasal Swab 1 Application(s) Both Nostrils once  selegiline Oral Tab/Cap 5 milliGRAM(s) Oral <User Schedule>  vancomycin    Solution 125 milliGRAM(s) Oral every 6 hours    MEDICATIONS  (PRN):  acetaminophen     Tablet .. 1000 milliGRAM(s) Oral every 8 hours PRN Mild Pain (1 - 3)  HYDROmorphone  Injectable 0.5 milliGRAM(s) IV Push every 3 hours PRN Breakthrough pain  HYDROmorphone  Injectable 0.5 milliGRAM(s) IV Push every 15 minutes PRN breakthrough pain in the PACU  magnesium hydroxide Suspension 30 milliLiter(s) Oral daily PRN Constipation  ondansetron    Tablet 4 milliGRAM(s) Oral every 6 hours PRN Nausea and/or Vomiting  oxyCODONE    IR 5 milliGRAM(s) Oral every 6 hours PRN Moderate Pain (4 - 6)  oxyCODONE    IR 10 milliGRAM(s) Oral every 6 hours PRN Severe Pain (7 - 10)      Allergies    penicillin -nausea   sulfa drugs (Nausea)  codeine (Nausea)    Intolerances        SOCIAL HISTORY:  -lives in [place]   -denies tobacco, etoh, recreational drug use   -denies pets.   -traveled to [facility] in the fall this year       FAMILY HISTORY:   no FH leading to current infection    Vital Signs Last 24 Hrs  T(C): 36.7 (23 Dec 2024 14:20), Max: 37.2 (22 Dec 2024 18:00)  T(F): 98.1 (23 Dec 2024 14:20), Max: 99 (22 Dec 2024 18:00)  HR: 98 (23 Dec 2024 14:20) (86 - 100)  BP: 143/70 (23 Dec 2024 14:20) (114/73 - 154/74)  BP(mean): --  RR: 15 (23 Dec 2024 14:20) (15 - 18)  SpO2: 98% (23 Dec 2024 14:20) (94% - 98%)    Parameters below as of 23 Dec 2024 14:20  Patient On (Oxygen Delivery Method): room air        12-22-24 @ 07:01  -  12-23-24 @ 07:00  --------------------------------------------------------  IN: 0 mL / OUT: 452 mL / NET: -452 mL        PHYSICAL EXAM:  Constitutional: alert, NAD  Eyes: the sclera and conjunctiva were normal.   ENT: the ears and nose were normal in appearance.   Neck: the appearance of the neck was normal and the neck was supple.   Pulmonary: no respiratory distress and lungs were clear to auscultation bilaterally.   Heart: heart rate was normal and rhythm regular, normal S1 and S2  Vascular:. there was no peripheral edema  Abdomen: normal bowel sounds, soft, non-tender  Neurological: no focal deficits.   Psychiatric: the affect was normal  Extremities: LUE wrapped - dressing c/d/i.       LABS:                        9.0    16.80 )-----------( 404      ( 23 Dec 2024 05:30 )             27.6     12-23    137  |  104  |  14  ----------------------------<  102[H]  3.9   |  22  |  0.91    Ca    8.2[L]      23 Dec 2024 05:30    TPro  5.3[L]  /  Alb  2.5[L]  /  TBili  0.3  /  DBili  x   /  AST  15  /  ALT  <5[L]  /  AlkPhos  152[H]  12-23      Urinalysis Basic - ( 23 Dec 2024 05:30 )    Color: x / Appearance: x / SG: x / pH: x  Gluc: 102 mg/dL / Ketone: x  / Bili: x / Urobili: x   Blood: x / Protein: x / Nitrite: x   Leuk Esterase: x / RBC: x / WBC x   Sq Epi: x / Non Sq Epi: x / Bacteria: x        MICROBIOLOGY:    Urinalysis with Rflx Culture (collected 12-18-24 @ 09:11)      RADIOLOGY & ADDITIONAL STUDIES:   INFECTIOUS DISEASES INITIAL CONSULT NOTE    HPI:  79-year-old with prior history of Parkinson's (dx 2015), hypertension and mechanical fall on 11/24 resulting in L DRF and L periprosthetic hip fracture presenting for scheduled ORIF of L distal radius and L hip. ID consulted for C.diff.   Pt was initially seen and admitted 11/25 after fall and was found to have L DRF and L periprosthetic hip fracture. She was then sent to [place] rehab, with follow-up to see Dr. Loja. Pt had repeat imaging with worsening of fracture. Plan at that time was for admission for L distal radius ORIF and L hip surgery at a later date. Patient at baseline ambulates without assistive device. Ambulates with walker since injury. Upon arrival, afebrile with WBC 14 (no shift). She is s/p L wrist ORIF 12/12 and L hip periprosthetic ORIF 12/16 -- received periop cefazolin. On 12/19, she had 6 episodes of watery stool. She had been on senna and miralax which was stopped 12/20. C diff was sent 12/20 and toxin positive. She was started on PO vanco. She denies fevers, chills, N/V, abd pain, dysuria. She has not had diarrhea in the last 24 hours - has one pasty BM today and 2 pasty stools yesterday. She is also s/p erythromycin suspension for gastroparesis 12/18-12/19. WBC 23 on 12/22; 16 today. Has had C.diff before in 2021 - states she was admitted to [facility]- she took course of fidaxomicin with resolution (her son had wanted to pay for the more expensive treatment at that time.            PAST MEDICAL & SURGICAL HISTORY:  Parkinson disease      Hypertension            Review of Systems:   Constitutional, eyes, ENT, cardiovascular, respiratory, gastrointestinal, genitourinary, integumentary, neurological, psychiatric and heme/lymph are otherwise negative other than noted above       ANTIBIOTICS:  MEDICATIONS  (STANDING):  carbidopa 25 milliGRAM(s) Oral <User Schedule>  carbidopa 23.75 mG/levodopa 95 mG ER 2 Capsule(s) Oral <User Schedule>  cholecalciferol 2000 Unit(s) Oral daily  enoxaparin Injectable 30 milliGRAM(s) SubCutaneous every 24 hours  influenza  Vaccine (HIGH DOSE) 0.5 milliLiter(s) IntraMuscular once  lactobacillus acidophilus 1 Tablet(s) Oral daily  losartan 100 milliGRAM(s) Oral daily  multivitamin 1 Tablet(s) Oral daily  povidone iodine 10% Nasal Swab 1 Application(s) Both Nostrils once  selegiline Oral Tab/Cap 5 milliGRAM(s) Oral <User Schedule>  vancomycin    Solution 125 milliGRAM(s) Oral every 6 hours    MEDICATIONS  (PRN):  acetaminophen     Tablet .. 1000 milliGRAM(s) Oral every 8 hours PRN Mild Pain (1 - 3)  HYDROmorphone  Injectable 0.5 milliGRAM(s) IV Push every 3 hours PRN Breakthrough pain  HYDROmorphone  Injectable 0.5 milliGRAM(s) IV Push every 15 minutes PRN breakthrough pain in the PACU  magnesium hydroxide Suspension 30 milliLiter(s) Oral daily PRN Constipation  ondansetron    Tablet 4 milliGRAM(s) Oral every 6 hours PRN Nausea and/or Vomiting  oxyCODONE    IR 5 milliGRAM(s) Oral every 6 hours PRN Moderate Pain (4 - 6)  oxyCODONE    IR 10 milliGRAM(s) Oral every 6 hours PRN Severe Pain (7 - 10)      Allergies    penicillin -nausea   sulfa drugs (Nausea)  codeine (Nausea)    Intolerances        SOCIAL HISTORY:  -lives in [place];  , two children  -denies tobacco, etoh, recreational drug use   -denies pets.   -traveled to [facility] in the fall this year   - worked as teacher and       FAMILY HISTORY:   no FH leading to current infection    Vital Signs Last 24 Hrs  T(C): 36.7 (23 Dec 2024 14:20), Max: 37.2 (22 Dec 2024 18:00)  T(F): 98.1 (23 Dec 2024 14:20), Max: 99 (22 Dec 2024 18:00)  HR: 98 (23 Dec 2024 14:20) (86 - 100)  BP: 143/70 (23 Dec 2024 14:20) (114/73 - 154/74)  BP(mean): --  RR: 15 (23 Dec 2024 14:20) (15 - 18)  SpO2: 98% (23 Dec 2024 14:20) (94% - 98%)    Parameters below as of 23 Dec 2024 14:20  Patient On (Oxygen Delivery Method): room air        12-22-24 @ 07:01  -  12-23-24 @ 07:00  --------------------------------------------------------  IN: 0 mL / OUT: 452 mL / NET: -452 mL        PHYSICAL EXAM:  Constitutional: alert, NAD  Eyes: the sclera and conjunctiva were normal.   ENT: the ears and nose were normal in appearance.   Neck: the appearance of the neck was normal and the neck was supple.   Pulmonary: no respiratory distress and lungs were clear to auscultation bilaterally.   Heart: heart rate was normal and rhythm regular, normal S1 and S2  Vascular:. there was no peripheral edema  Abdomen: normal bowel sounds, soft, non-tender  Neurological: no focal deficits.   Psychiatric: the affect was normal  Extremities: LUE wrapped - dressing c/d/i.       LABS:                        9.0    16.80 )-----------( 404      ( 23 Dec 2024 05:30 )             27.6     12-23    137  |  104  |  14  ----------------------------<  102[H]  3.9   |  22  |  0.91    Ca    8.2[L]      23 Dec 2024 05:30    TPro  5.3[L]  /  Alb  2.5[L]  /  TBili  0.3  /  DBili  x   /  AST  15  /  ALT  <5[L]  /  AlkPhos  152[H]  12-23      Urinalysis Basic - ( 23 Dec 2024 05:30 )    Color: x / Appearance: x / SG: x / pH: x  Gluc: 102 mg/dL / Ketone: x  / Bili: x / Urobili: x   Blood: x / Protein: x / Nitrite: x   Leuk Esterase: x / RBC: x / WBC x   Sq Epi: x / Non Sq Epi: x / Bacteria: x        MICROBIOLOGY:    Urinalysis with Rflx Culture (collected 12-18-24 @ 09:11)      RADIOLOGY & ADDITIONAL STUDIES:

## 2024-12-23 NOTE — CHART NOTE - NSCHARTNOTEFT_GEN_A_CORE
Admitting Diagnosis:   Patient is a 79y old  Female who presents with a chief complaint of WRIST FRACTURE     (16 Dec 2024 13:49)      PAST MEDICAL & SURGICAL HISTORY:  Parkinson disease      Hypertension      Current Nutrition Order:  Diet, Regular:   Supplement Feeding Modality:  Oral  Ensure Plus High Protein Cans or Servings Per Day:  1       Frequency:  Daily (12-19-24 @ 16:15) [Active]     PO Intake: Good (%) [   ]  Fair (50-75%) [ x ] Poor (<25%) [   ]    GI Issues: denies nausea/ vomiting/ constipation, reports was having diarrhea and ? tested positive for C. Diff but denies any episodes of diarrhea overnight or this morning.     Pain: 0/10 as per flowsheet    Skin Integrity: surgical incisions; no pressure injuries documented, jim 20, 1+ left hand edema documented         12-22-24 @ 07:01  -  12-23-24 @ 07:00  --------------------------------------------------------  IN: 0 mL / OUT: 452 mL / NET: -452 mL        Labs:   12-23    137  |  104  |  14  ----------------------------<  102[H]  3.9   |  22  |  0.91    Ca    8.2[L]      23 Dec 2024 05:30    TPro  5.3[L]  /  Alb  2.5[L]  /  TBili  0.3  /  DBili  x   /  AST  15  /  ALT  <5[L]  /  AlkPhos  152[H]  12-23    CAPILLARY BLOOD GLUCOSE          Medications:  MEDICATIONS  (STANDING):  carbidopa 25 milliGRAM(s) Oral <User Schedule>  carbidopa 23.75 mG/levodopa 95 mG ER 2 Capsule(s) Oral <User Schedule>  cholecalciferol 2000 Unit(s) Oral daily  enoxaparin Injectable 30 milliGRAM(s) SubCutaneous every 24 hours  influenza  Vaccine (HIGH DOSE) 0.5 milliLiter(s) IntraMuscular once  losartan 100 milliGRAM(s) Oral daily  povidone iodine 10% Nasal Swab 1 Application(s) Both Nostrils once  selegiline Oral Tab/Cap 5 milliGRAM(s) Oral <User Schedule>  vancomycin    Solution 125 milliGRAM(s) Oral every 6 hours    MEDICATIONS  (PRN):  acetaminophen     Tablet .. 1000 milliGRAM(s) Oral every 8 hours PRN Mild Pain (1 - 3)  HYDROmorphone  Injectable 0.5 milliGRAM(s) IV Push every 3 hours PRN Breakthrough pain  HYDROmorphone  Injectable 0.5 milliGRAM(s) IV Push every 15 minutes PRN breakthrough pain in the PACU  magnesium hydroxide Suspension 30 milliLiter(s) Oral daily PRN Constipation  ondansetron    Tablet 4 milliGRAM(s) Oral every 6 hours PRN Nausea and/or Vomiting  oxyCODONE    IR 5 milliGRAM(s) Oral every 6 hours PRN Moderate Pain (4 - 6)  oxyCODONE    IR 10 milliGRAM(s) Oral every 6 hours PRN Severe Pain (7 - 10)      Height for BMI (FEET)	5 Feet  Height for BMI (INCHES)	6 Inch(s)  Height for BMI (CENTIMETERS)	167.64 Centimeter(s)  Weight for BMI (lbs)	130 lb  Weight for BMI (kg)	59 kg  Body Mass Index	20.9  Ideal body weight 130 lb / 59.1 kg (100%)     Weight Change: No new wt obtained since 12/16. Recommend obtain weekly wt to track / trend wt changes.     [Mild Protein Calorie Malnutrition: Risk Assessment Completed ]  - PO intake: consuming </= 75% estimated nutrient needs x5 days  - NFPE: moderate muscle/fat wasting    Estimated energy needs:   Estimated Energy Needs Weight (lbs)	130 lb  Estimated Energy Needs Weight (kg)	58.9 kg  Estimated Energy Needs From (nely/kg)	25  Estimated Energy Needs To (nely/kg)	30  Estimated Energy Needs Calculated From (nely/kg)	1472  Estimated Energy Needs Calculated To (nely/kg)	1767    Estimated Protein Needs Weight (lbs)	130 lb  Estimated Protein Needs Weight (kg)	58.9 kg  Estimated Protein Needs From (g/kg)	1.2  Estimated Protein Needs To (g/kg)	1.4  Estimated Protein Needs Calculated From (g/kg)	70.68  Estimated Protein Needs Calculated To (g/kg)	82.46    Estimated Fluid Needs Weight (lbs)	130 lb  Estimated Fluid Needs Weight (kg)	58.9 kg  Estimated Fluid Needs From (ml/kg)	30  Estimated Fluid Needs To (ml/kg)	35  Estimated Fluid Needs Calculated From (ml/kg)	1767  Estimated Fluid Needs Calculated To (ml/kg)	2061    Other Calculations:	Based on dosing wt 130 pounds as pt between 8012-% ideal body weight. Needs adjusted for wrist / hip fracture, post-op healing, advanced age.    Subjective: "79-year-old with prior history of Parkinson's, hypertension and mechanical fall on 11/24 resulting in L DRF and L periprosthetic hip fracture."    Patient seen at bedside on 9WO for nutrition follow up. Current diet order: regular. Reports decreased appetite due to complains of dry mouth, observed breakfast of scrambled eggs and Ukrainian toast ~50% consumed this morning, likely meeting 50-75% estimated nutrient needs at this time, endorses drinking ~1 Ensure plus high protein / day (provides 350 kcal, 20g protein/ shake) - RD provided nutrition tips for complains of dry moth and added extra condiments / sauces/ gravies to each meal on tray ticket; RD also encouraged pt to continue to consume at least 1 Ensure per day to optimize nutrient intake, pt receptive. Denies nausea/vomiting/constipation, complains of persistent diarrhea x past few days and states ? C. Diff due to varying C. Diff results as per pt - denies diarrhea overnight / this morning. Labs: serum glucose 102 mg/dL( WNL), alkaline phosphatase elevated. Meds reviewed as above. RD will continue to follow, see nutrition recommendations below.     Previous Nutrition Diagnosis: Pt meets criteria for mild malnutrition in context of acute illness related to decreased ability to meet increased nutrient needs due to limited mobility associated with decreased PO intake status post wrist and hip repair surgery as evidenced by consuming </= 75% estimated nutrient needs x5 days and moderate muscle/fat wasting.    Active [  x ]  Resolved [   ]    Goal: Pt will consume >/= 75% of estimated nutrient needs     Recommendations:  1. Continue with regular diet to maximize nutrient intake   >> Continue with ensure plus high protein 1x/day for now to optimize PO intake (provides 350 kcal, 20g protein/ shake)  **Pt requires feeding assistance, nursing aware**  2. once feasible, encourage and monitor PO intake, honor preferences as able   >> Consistently meet >75% of estimated needs during admission   3. Monitor wt trends, GI function, skin integrity  >>obtain weekly wt to track / trend wt changes   4. Monitor lytes, renal indices, blood glucose, LFTs    5. Pain and bowel regimen per team   6. Anti-emetic PRN to promote PO intake once feasible   7. Consider adding MVI daily to ensure 100% RDA met - communicated with team   8. Consider adding probiotic due to complaints of previous diarrhea / ? C. Diff    Education: RD continued to encourage PO intake during meals & reviewed importance, emphasized maximizing food preferences, provided tips for dry mouth including consuming cold beverages, using sucking candies to stimulate saliva, and adding condiments / sauces / gravies to each meal, pt receptive and willing to trial.     Risk Level: High [ x  ] Moderate [   ] Low [   ]

## 2024-12-23 NOTE — PROGRESS NOTE ADULT - ASSESSMENT
Ms. Alma Osorio is a 79/F with Parkinson disease, HTN, prior left hemiarthroplasty in 2021 and a recent admission (11/25/24-11/26/24) after a mechanical fall subsequently found to have L hip periprosthetic fracture and L distal radial fracture who presented back to [facility] per outpatient surgeon request for left distal radius open reduction and internal fixation surgery. She underwent closed reduction of the left distal radius. She is now s/p periprosthetic left hip ORIF.    Recommendations:    #Left distal radius fracture  #Left hip periprosthetic fracture  #Post op status  - Provide adequate analgesia, Incentive spirometry, mobilize with fall precautions, bowel regimen, DVT prophylaxis  - PT/OT    #C diff infection, first recurrence  - last C diff in 2021 reportedly  - WBC 16 (from 23 on 12/22) Tmax 99.6F, no abdominal pain   - per guidelines options to treat first recurrence are the following:  *Fidaxomicin  200 mg orally twice daily for 10 days, OR  200 mg orally twice daily for 5 days, followed by once every other day for 20 days    *Vancomycin in a tapered and pulsed regimen, for example:  125 mg orally 4 times daily for 10 to 14 days, then  125 mg orally 2 times daily for 7 days, then  125 mg orally once daily for 7 days, then  125 mg orally every 2 to 3 days for 2 to 8 weeks  - patient currently on PO Vancomycin  - ID consult  - isolation precautions per policy    #Hypokalemia, improved  - monitor  - can check Mg level     #Hyponatremia, likely SIADH 2/2 pain, improved  - fluid restriction of 1.5L/day  - pain control    #Anemia, acute on chronic iron deficiency anemia  #Thrombocytosis, possibly reactive, improved  - Tsat 16%, ferritin 252, B12 848, haptoglobin 244  - Monitor Hgb  - Transfuse for Hgb < 7  - Ensure Type and Screen available  - can offer ferrous sulfate 325 mg every other day with bowel regimen on DC    #Vit D deficiency  #Alkaline phosphatase elevation  - on Vit D supplementation  - can send GGT if AlkPhos continues to trend up; TBil normal    #HTN (hypertension)  - on Losartan 100 mg daily, ensure BP parameters  - BP monitoring    #Parkinson disease  - on selegiline 5mg BID, carbidopa levodopa 23.75-95 XR 2 tabs TID, carbidopa 25 1 tab TID, Domperidone (patient reports taking this to address the Parkinson's meds side effects, unsure if she also has gastroparesis)  - patient advised to have someone from home bring Domperidone which is NF in the hospital so she can get it inpatient if possible  - PT/OT    DVT ppx: Lovenox    Feel free to reach out for any questions. Recommendations discussed with primary team.

## 2024-12-24 LAB
ANION GAP SERPL CALC-SCNC: 9 MMOL/L — SIGNIFICANT CHANGE UP (ref 5–17)
BASOPHILS # BLD AUTO: 0 K/UL — SIGNIFICANT CHANGE UP (ref 0–0.2)
BASOPHILS NFR BLD AUTO: 0 % — SIGNIFICANT CHANGE UP (ref 0–2)
BUN SERPL-MCNC: 16 MG/DL — SIGNIFICANT CHANGE UP (ref 7–23)
CALCIUM SERPL-MCNC: 7.8 MG/DL — LOW (ref 8.4–10.5)
CHLORIDE SERPL-SCNC: 106 MMOL/L — SIGNIFICANT CHANGE UP (ref 96–108)
CO2 SERPL-SCNC: 22 MMOL/L — SIGNIFICANT CHANGE UP (ref 22–31)
CREAT SERPL-MCNC: 0.91 MG/DL — SIGNIFICANT CHANGE UP (ref 0.5–1.3)
EGFR: 64 ML/MIN/1.73M2 — SIGNIFICANT CHANGE UP
EOSINOPHIL # BLD AUTO: 0.3 K/UL — SIGNIFICANT CHANGE UP (ref 0–0.5)
EOSINOPHIL NFR BLD AUTO: 1.8 % — SIGNIFICANT CHANGE UP (ref 0–6)
GLUCOSE SERPL-MCNC: 103 MG/DL — HIGH (ref 70–99)
HCT VFR BLD CALC: 27.2 % — LOW (ref 34.5–45)
HGB BLD-MCNC: 8.7 G/DL — LOW (ref 11.5–15.5)
LYMPHOCYTES # BLD AUTO: 1.46 K/UL — SIGNIFICANT CHANGE UP (ref 1–3.3)
LYMPHOCYTES # BLD AUTO: 8.9 % — LOW (ref 13–44)
MCHC RBC-ENTMCNC: 28.2 PG — SIGNIFICANT CHANGE UP (ref 27–34)
MCHC RBC-ENTMCNC: 32 G/DL — SIGNIFICANT CHANGE UP (ref 32–36)
MCV RBC AUTO: 88.3 FL — SIGNIFICANT CHANGE UP (ref 80–100)
MONOCYTES # BLD AUTO: 0.15 K/UL — SIGNIFICANT CHANGE UP (ref 0–0.9)
MONOCYTES NFR BLD AUTO: 0.9 % — LOW (ref 2–14)
NEUTROPHILS # BLD AUTO: 13.65 K/UL — HIGH (ref 1.8–7.4)
NEUTROPHILS NFR BLD AUTO: 83 % — HIGH (ref 43–77)
PLATELET # BLD AUTO: 404 K/UL — HIGH (ref 150–400)
POTASSIUM SERPL-MCNC: 4 MMOL/L — SIGNIFICANT CHANGE UP (ref 3.5–5.3)
POTASSIUM SERPL-SCNC: 4 MMOL/L — SIGNIFICANT CHANGE UP (ref 3.5–5.3)
RBC # BLD: 3.08 M/UL — LOW (ref 3.8–5.2)
RBC # FLD: 14.8 % — HIGH (ref 10.3–14.5)
SODIUM SERPL-SCNC: 137 MMOL/L — SIGNIFICANT CHANGE UP (ref 135–145)
WBC # BLD: 16.44 K/UL — HIGH (ref 3.8–10.5)
WBC # FLD AUTO: 16.44 K/UL — HIGH (ref 3.8–10.5)

## 2024-12-24 PROCEDURE — 93010 ELECTROCARDIOGRAM REPORT: CPT

## 2024-12-24 PROCEDURE — 99233 SBSQ HOSP IP/OBS HIGH 50: CPT

## 2024-12-24 PROCEDURE — 99232 SBSQ HOSP IP/OBS MODERATE 35: CPT

## 2024-12-24 RX ADMIN — LEVODOPA AND CARBIDOPA 2 CAPSULE(S): 145; 36.25 CAPSULE, EXTENDED RELEASE ORAL at 13:00

## 2024-12-24 RX ADMIN — VANCOMYCIN HYDROCHLORIDE 125 MILLIGRAM(S): 5 INJECTION, POWDER, LYOPHILIZED, FOR SOLUTION INTRAVENOUS at 06:11

## 2024-12-24 RX ADMIN — LOSARTAN POTASSIUM 100 MILLIGRAM(S): 100 TABLET, FILM COATED ORAL at 06:11

## 2024-12-24 RX ADMIN — VANCOMYCIN HYDROCHLORIDE 125 MILLIGRAM(S): 5 INJECTION, POWDER, LYOPHILIZED, FOR SOLUTION INTRAVENOUS at 12:56

## 2024-12-24 RX ADMIN — VANCOMYCIN HYDROCHLORIDE 125 MILLIGRAM(S): 5 INJECTION, POWDER, LYOPHILIZED, FOR SOLUTION INTRAVENOUS at 18:24

## 2024-12-24 RX ADMIN — ENOXAPARIN SODIUM 30 MILLIGRAM(S): 60 INJECTION INTRAVENOUS; SUBCUTANEOUS at 21:05

## 2024-12-24 RX ADMIN — LEVODOPA AND CARBIDOPA 2 CAPSULE(S): 145; 36.25 CAPSULE, EXTENDED RELEASE ORAL at 18:40

## 2024-12-24 RX ADMIN — LEVODOPA AND CARBIDOPA 2 CAPSULE(S): 145; 36.25 CAPSULE, EXTENDED RELEASE ORAL at 07:40

## 2024-12-24 RX ADMIN — ACETAMINOPHEN 1000 MILLIGRAM(S): 80 SOLUTION/ DROPS ORAL at 19:38

## 2024-12-24 RX ADMIN — Medication 1 TABLET(S): at 12:55

## 2024-12-24 RX ADMIN — Medication 5 MILLIGRAM(S): at 07:36

## 2024-12-24 RX ADMIN — CARBIDOPA 25 MILLIGRAM(S): 25 TABLET ORAL at 18:24

## 2024-12-24 RX ADMIN — Medication 5 MILLIGRAM(S): at 12:57

## 2024-12-24 RX ADMIN — CARBIDOPA 25 MILLIGRAM(S): 25 TABLET ORAL at 12:56

## 2024-12-24 RX ADMIN — CARBIDOPA 25 MILLIGRAM(S): 25 TABLET ORAL at 07:37

## 2024-12-24 RX ADMIN — Medication 2000 UNIT(S): at 12:56

## 2024-12-24 NOTE — PROGRESS NOTE ADULT - ASSESSMENT
Ms. Alma Osorio is a 79/F with Parkinson disease, HTN, prior left hemiarthroplasty in 2021 and a recent admission (11/25/24-11/26/24) after a mechanical fall subsequently found to have L hip periprosthetic fracture and L distal radial fracture who presented back to [facility] per outpatient surgeon request for left distal radius open reduction and internal fixation surgery. She underwent closed reduction of the left distal radius. She is now s/p periprosthetic left hip ORIF.    Recommendations:    #Left distal radius fracture  #Left hip periprosthetic fracture  #Post op status  - pain controlled with PRN Tylenol for mild pain, Oxycodone for moderate to severe pain  - Provide adequate analgesia, Incentive spirometry, mobilize with fall precautions, bowel regimen, DVT prophylaxis  - PT/OT    #C diff infection, first recurrence  - last C diff in 2021 reportedly  - ID following   - WBC still 16 (from 23 on 12/22) Tmax 98.7FF, no abdominal pain   - per guidelines options to treat first recurrence are the following:  *Fidaxomicin  200 mg orally twice daily for 10 days, OR  200 mg orally twice daily for 5 days, followed by once every other day for 20 days    *Vancomycin in a tapered and pulsed regimen, for example:  125 mg orally 4 times daily for 10 to 14 days, then  125 mg orally 2 times daily for 7 days, then  125 mg orally once daily for 7 days, then  125 mg orally every 2 to 3 days for 2 to 8 weeks  - patient currently on PO Vancomycin  - ID following  - isolation precautions per policy    #Hypokalemia, improved  - monitor  - can check Mg level     #Hyponatremia, likely SIADH 2/2 pain, improved  - fluid restriction of 1.5L/day  - pain control    #Anemia, acute on chronic iron deficiency anemia  #Thrombocytosis, possibly reactive, improved  - Tsat 16%, ferritin 252, B12 848, haptoglobin 244  - Monitor Hgb  - Transfuse for Hgb < 7  - Ensure Type and Screen available  - can offer ferrous sulfate 325 mg every other day with bowel regimen on DC  - I spoke with hematology team (Dr. Zarate one of the fellows) and requested review of peripheral blood smear because of reported smudge cells and myelocytes and metamyelocytes and per our conversation it was more consistent with the ongoing infectious process and not concerning for blood malignancy    #Vit D deficiency  #Alkaline phosphatase elevation  - on Vit D supplementation  - can send GGT if AlkPhos continues to trend up; TBil normal    #HTN (hypertension)  - on Losartan 100 mg daily, ensure BP parameters  - BP monitoring    #Parkinson disease  - on selegiline 5mg BID, carbidopa levodopa 23.75-95 XR 2 tabs TID, carbidopa 25 1 tab TID, Domperidone (patient reports taking this to address the Parkinson's meds side effects, unsure if she also has gastroparesis)  - patient advised to have someone from home bring Domperidone which is NF in the hospital so she can get it inpatient if possible  - PT/OT    DVT ppx: Lovenox  Dispo: [facility]    Feel free to reach out for any questions. Recommendations discussed with primary team.

## 2024-12-24 NOTE — PROGRESS NOTE ADULT - SUBJECTIVE AND OBJECTIVE BOX
Ortho Note    Pt comfortable without complaints, pain controlled. Diarrhea improving  Denies CP, SOB, N/V, numbness/tingling     Vital Signs Last 24 Hrs  T(C): 37.1 (24 Dec 2024 05:30), Max: 37.1 (24 Dec 2024 05:30)  T(F): 98.7 (24 Dec 2024 05:30), Max: 98.7 (24 Dec 2024 05:30)  HR: 86 (24 Dec 2024 06:44) (85 - 98)  BP: 166/65 (24 Dec 2024 06:44) (138/69 - 168/73)  BP(mean): --  RR: 17 (24 Dec 2024 06:44) (15 - 18)  SpO2: 99% (24 Dec 2024 06:44) (97% - 99%)    Parameters below as of 24 Dec 2024 06:44  Patient On (Oxygen Delivery Method): room air      General: Pt Alert and oriented, NAD  LUE:   Dressing C/D/I: sugartong splint LUE, carterblock pillow applied   Sensation: SILT  Motor: wiggle fingers    LLE:  SILT  Motor 5/5 TA/GS/EHL/FHL  Pulses 2+ marked      A/P: 79yFemale s/p Left Hip Periprosthetic ORIF by Dr. BRENDEN Loja on 12-16 and DRF ORIF 12/12  - Stable  - Pain Control  - DVT ppx: SCDs, LVX  - Post op abx: Ancef  - WBS: Protected weight bearing  LLE posterior hip precautions, NWB L UE  - C-diff postiive - started on Vancomycin  - [x]ID consult  Dispo : [facility]    Ortho Pager [phone]

## 2024-12-24 NOTE — PROGRESS NOTE ADULT - SUBJECTIVE AND OBJECTIVE BOX
Patient is a 79y old  Female who presents with a chief complaint of wrist fracture (24 Dec 2024 11:55).    Patient seen and examined today. She denies significant wrist and hip pain at rest. She has not had any diarrhea recurrence. She denies abdominal pain, vomiting, fever, chills, dysuria, chest pain, dyspnea.    Allergies    penicillin (Swelling)  sulfa drugs (Nausea)  codeine (Nausea)    Last Bowel Movement: 22-Dec-2024 (12-23-24 @ 21:00)    MEDICATIONS  (STANDING):  carbidopa 25 milliGRAM(s) Oral <User Schedule>  carbidopa 23.75 mG/levodopa 95 mG ER 2 Capsule(s) Oral <User Schedule>  cholecalciferol 2000 Unit(s) Oral daily  enoxaparin Injectable 30 milliGRAM(s) SubCutaneous every 24 hours  influenza  Vaccine (HIGH DOSE) 0.5 milliLiter(s) IntraMuscular once  lactobacillus acidophilus 1 Tablet(s) Oral daily  losartan 100 milliGRAM(s) Oral daily  multivitamin 1 Tablet(s) Oral daily  povidone iodine 10% Nasal Swab 1 Application(s) Both Nostrils once  selegiline Oral Tab/Cap 5 milliGRAM(s) Oral <User Schedule>  vancomycin    Solution 125 milliGRAM(s) Oral every 6 hours    MEDICATIONS  (PRN):  acetaminophen     Tablet .. 1000 milliGRAM(s) Oral every 8 hours PRN Mild Pain (1 - 3)  magnesium hydroxide Suspension 30 milliLiter(s) Oral daily PRN Constipation  ondansetron    Tablet 4 milliGRAM(s) Oral every 6 hours PRN Nausea and/or Vomiting  oxyCODONE    IR 5 milliGRAM(s) Oral every 6 hours PRN Moderate Pain (4 - 6)  oxyCODONE    IR 10 milliGRAM(s) Oral every 6 hours PRN Severe Pain (7 - 10)      Vital Signs Last 24 Hrs  T(C): 36.9 (12-24-24 @ 12:16), Max: 37.1 (12-24-24 @ 05:30)  T(F): 98.5 (12-24-24 @ 12:16), Max: 98.7 (12-24-24 @ 05:30)  HR: 95 (12-24-24 @ 12:16) (85 - 95)  BP: 130/78 (12-24-24 @ 12:16) (130/78 - 168/73)  BP(mean): --  RR: 18 (12-24-24 @ 12:16) (17 - 18)  SpO2: 99% (12-24-24 @ 12:16) (97% - 99%)      I&O's Summary    23 Dec 2024 07:01  -  24 Dec 2024 07:00  --------------------------------------------------------  IN: 730 mL / OUT: 450 mL / NET: 280 mL      Oxygen Saturation Index= Unable to calculate   [Based on FiO2 = Unknown, SpO2 = 99(12/24/2024 12:16), MAP = Unknown]    PHYSICAL EXAM:  GENERAL: NAD  HEAD:  Atraumatic, Normocephalic  EYES: EOMI, conjunctiva and sclera clear  ENMT: Moist mucous membranes  NECK: Supple, No JVD  NERVOUS SYSTEM:  Alert & Oriented X3, Good concentration  CHEST/LUNG: Clear to auscultation bilaterally  HEART: Regular rate and rhythm; Normal S1 and S2  ABDOMEN: Soft, Nontender, Nondistended; Bowel sounds present  EXTREMITIES:  left wrist and left hip dressing c/d/i; 2+ Peripheral Pulses  PSYCH: normal mood and affect    Consultant(s) Notes Reviewed:  [x ] YES  [ ] NO  Care Discussed with Consultants/Other Providers [ x] YES  [ ] NO    Investigations:                        8.7    16.44 )-----------( 404      ( 24 Dec 2024 05:30 )             27.2     12-24    137  |  106  |  16  ----------------------------<  103[H]  4.0   |  22  |  0.91    Ca    7.8[L]      24 Dec 2024 05:30    TPro  5.3[L]  /  Alb  2.5[L]  /  TBili  0.3  /  DBili  x   /  AST  15  /  ALT  <5[L]  /  AlkPhos  152[H]  12-23

## 2024-12-24 NOTE — PROGRESS NOTE ADULT - SUBJECTIVE AND OBJECTIVE BOX
INFECTIOUS DISEASES CONSULT FOLLOW-UP NOTE    INTERVAL HPI/OVERNIGHT EVENTS:    Pt seen and examined at bedside. PRAVEEN. Afebrile. Denies BM today. Had 1 "pasty" BM yesterday.       ROS:   Constitutional, eyes, ENT, cardiovascular, respiratory, gastrointestinal, genitourinary, integumentary, neurological, psychiatric and heme/lymph are otherwise negative other than noted above       ANTIBIOTICS/RELEVANT:    MEDICATIONS  (STANDING):  carbidopa 25 milliGRAM(s) Oral <User Schedule>  carbidopa 23.75 mG/levodopa 95 mG ER 2 Capsule(s) Oral <User Schedule>  cholecalciferol 2000 Unit(s) Oral daily  enoxaparin Injectable 30 milliGRAM(s) SubCutaneous every 24 hours  influenza  Vaccine (HIGH DOSE) 0.5 milliLiter(s) IntraMuscular once  lactobacillus acidophilus 1 Tablet(s) Oral daily  losartan 100 milliGRAM(s) Oral daily  multivitamin 1 Tablet(s) Oral daily  povidone iodine 10% Nasal Swab 1 Application(s) Both Nostrils once  selegiline Oral Tab/Cap 5 milliGRAM(s) Oral <User Schedule>  vancomycin    Solution 125 milliGRAM(s) Oral every 6 hours    MEDICATIONS  (PRN):  acetaminophen     Tablet .. 1000 milliGRAM(s) Oral every 8 hours PRN Mild Pain (1 - 3)  magnesium hydroxide Suspension 30 milliLiter(s) Oral daily PRN Constipation  ondansetron    Tablet 4 milliGRAM(s) Oral every 6 hours PRN Nausea and/or Vomiting  oxyCODONE    IR 5 milliGRAM(s) Oral every 6 hours PRN Moderate Pain (4 - 6)  oxyCODONE    IR 10 milliGRAM(s) Oral every 6 hours PRN Severe Pain (7 - 10)        Vital Signs Last 24 Hrs  T(C): 37.1 (24 Dec 2024 05:30), Max: 37.1 (24 Dec 2024 05:30)  T(F): 98.7 (24 Dec 2024 05:30), Max: 98.7 (24 Dec 2024 05:30)  HR: 86 (24 Dec 2024 06:44) (85 - 98)  BP: 166/65 (24 Dec 2024 06:44) (138/69 - 168/73)  BP(mean): --  RR: 17 (24 Dec 2024 06:44) (15 - 18)  SpO2: 99% (24 Dec 2024 06:44) (97% - 99%)    Parameters below as of 24 Dec 2024 06:44  Patient On (Oxygen Delivery Method): room air        12-23-24 @ 07:01  -  12-24-24 @ 07:00  --------------------------------------------------------  IN: 730 mL / OUT: 450 mL / NET: 280 mL      PHYSICAL EXAM:  Constitutional: alert, NAD  Eyes: the sclera and conjunctiva were normal.   ENT: the ears and nose were normal in appearance.   Neck: the appearance of the neck was normal and the neck was supple.   Pulmonary: no respiratory distress and lungs were clear to auscultation bilaterally.   Heart: heart rate was normal and rhythm regular, normal S1 and S2  Vascular:. there was no peripheral edema  Abdomen: normal bowel sounds, soft, non-tender  Neurological: no focal deficits.   Psychiatric: the affect was normal  Extremities: LUE wrapped - dressing c/d/i.       LABS:                        8.7    16.44 )-----------( 404      ( 24 Dec 2024 05:30 )             27.2     12-24    137  |  106  |  16  ----------------------------<  103[H]  4.0   |  22  |  0.91    Ca    7.8[L]      24 Dec 2024 05:30    TPro  5.3[L]  /  Alb  2.5[L]  /  TBili  0.3  /  DBili  x   /  AST  15  /  ALT  <5[L]  /  AlkPhos  152[H]  12-23      Urinalysis Basic - ( 24 Dec 2024 05:30 )    Color: x / Appearance: x / SG: x / pH: x  Gluc: 103 mg/dL / Ketone: x  / Bili: x / Urobili: x   Blood: x / Protein: x / Nitrite: x   Leuk Esterase: x / RBC: x / WBC x   Sq Epi: x / Non Sq Epi: x / Bacteria: x        MICROBIOLOGY:    Urinalysis with Rflx Culture (collected 12-18-24 @ 09:11)        RADIOLOGY & ADDITIONAL STUDIES:  Reviewed

## 2024-12-24 NOTE — PROGRESS NOTE ADULT - ASSESSMENT
79-year-old with prior history of Parkinson's (dx 2015), hypertension and mechanical fall on 11/24 resulting in L DRF and L periprosthetic hip fracture presenting for scheduled ORIF of L distal radius and L hip s/p L wrist ORIF 12/12 and L hip periprosthetic ORIF 12/16. She received periop cefazolin. She developed diarrhea on 12/19 and tested positive for c. diff toxin 12/20. She does have hx of c. diff s/p course of fidaxomicin in 2021. She is on PO vanc and diarrhea is improving - has not had for 24 hours.      79-year-old with prior history of Parkinson's (dx 2015), hypertension and mechanical fall on 11/24 resulting in L DRF and L periprosthetic hip fracture presenting for scheduled ORIF of L distal radius and L hip s/p L wrist ORIF 12/12 and L hip periprosthetic ORIF 12/16. She received periop cefazolin. She developed diarrhea on 12/19 and tested positive for c. diff toxin 12/20. She does have hx of c. diff s/p course of fidaxomicin in 2021. Diarrhea has now resolved. WBC 16.44 with shift. (16.80)     Suggest:  -trend WBC, add diff to CBC daily   -monitor for diarrhea   -continue PO vancomycin 125mg Q6  -Tentative regimen for PO vanc as outlined below:       - pulsed vancomycin regimen        125 mG every 6 hours for 10 – 14 days, then        125 mG every 12 hours for 7 days, then        125 mG once per day for 7 days, then         125 mG every other day for 7 days, then        125 mG every third day for 14 days    Team 2 will follow you. Dr. Morgan will cover Dola Day. Dr. Rowe will resume care 12/26   Case d/w primary team.  Final recommendation pending attending note.    Missy Silveira, Infectious Diseases PA  Please reach out for any questions 9 am-5pm.   For evenings and weekends, please call the ID physician on call.     79-year-old with prior history of Parkinson's (dx 2015), hypertension and mechanical fall on 11/24 resulting in L DRF and L periprosthetic hip fracture presenting for scheduled ORIF of L distal radius and L hip s/p L wrist ORIF 12/12 and L hip periprosthetic ORIF 12/16. She received periop cefazolin. She developed diarrhea on 12/19 and tested positive for c. diff toxin 12/20. She does have hx of c. diff s/p course of fidaxomicin in 2021. Diarrhea has now resolved. WBC 16.44 with shift. (16.80)     Suggest:  -trend WBC, add diff to CBC daily   -monitor for diarrhea   -continue PO vancomycin 125mg Q6  -Tentative regimen for PO vanc as outlined below:       - pulsed vancomycin regimen        125 mG every 6 hours to complete 14 days, then        125 mG every 12 hours for 7 days, then        125 mG once per day for 7 days, then         125 mG every other day for 7 days, then        125 mG every third day for 14 days    Team 2 will follow you. Dr. Morgan will cover Rosalio Day. Dr. Rowe will resume care 12/26   Case d/w primary team.  Final recommendation pending attending note.    Missy Silveira, Infectious Diseases PA  Please reach out for any questions 9 am-5pm.   For evenings and weekends, please call the ID physician on call.

## 2024-12-24 NOTE — CHART NOTE - NSCHARTNOTEFT_GEN_A_CORE
PBS reviewed with the attending--moderate poikilocytosis, moderate acanthocytosis, occasional schistocytes. No bands were visualized. Findings were consistent with infection.

## 2024-12-24 NOTE — PROGRESS NOTE ADULT - NS ATTEND AMEND GEN_ALL_CORE FT
As above
2nd episode of C diff, diarrhea very mild, resolved.  ROS ~negative.  Afebrile, still with WBC 16.4 with 83% PMNs, 3.6 meta, 1.8 myelo.  Would continue to monitor temp/WBC/stool frequency.  Tentative plan as above for vancomycin po pulse-taper.  Dr Morgan will cover from tonight through 12/25, I will resume care 12/26, team 2.

## 2024-12-25 LAB
ANION GAP SERPL CALC-SCNC: 9 MMOL/L — SIGNIFICANT CHANGE UP (ref 5–17)
BASOPHILS # BLD AUTO: 0.12 K/UL — SIGNIFICANT CHANGE UP (ref 0–0.2)
BASOPHILS NFR BLD AUTO: 0.9 % — SIGNIFICANT CHANGE UP (ref 0–2)
BUN SERPL-MCNC: 16 MG/DL — SIGNIFICANT CHANGE UP (ref 7–23)
CALCIUM SERPL-MCNC: 8.1 MG/DL — LOW (ref 8.4–10.5)
CHLORIDE SERPL-SCNC: 104 MMOL/L — SIGNIFICANT CHANGE UP (ref 96–108)
CO2 SERPL-SCNC: 26 MMOL/L — SIGNIFICANT CHANGE UP (ref 22–31)
CREAT SERPL-MCNC: 0.89 MG/DL — SIGNIFICANT CHANGE UP (ref 0.5–1.3)
EGFR: 66 ML/MIN/1.73M2 — SIGNIFICANT CHANGE UP
EOSINOPHIL # BLD AUTO: 0.23 K/UL — SIGNIFICANT CHANGE UP (ref 0–0.5)
EOSINOPHIL NFR BLD AUTO: 1.8 % — SIGNIFICANT CHANGE UP (ref 0–6)
GLUCOSE SERPL-MCNC: 104 MG/DL — HIGH (ref 70–99)
HCT VFR BLD CALC: 29.9 % — LOW (ref 34.5–45)
HGB BLD-MCNC: 9.5 G/DL — LOW (ref 11.5–15.5)
LYMPHOCYTES # BLD AUTO: 17.9 % — SIGNIFICANT CHANGE UP (ref 13–44)
LYMPHOCYTES # BLD AUTO: 2.29 K/UL — SIGNIFICANT CHANGE UP (ref 1–3.3)
MCHC RBC-ENTMCNC: 27.9 PG — SIGNIFICANT CHANGE UP (ref 27–34)
MCHC RBC-ENTMCNC: 31.8 G/DL — LOW (ref 32–36)
MCV RBC AUTO: 87.9 FL — SIGNIFICANT CHANGE UP (ref 80–100)
MONOCYTES # BLD AUTO: 0.68 K/UL — SIGNIFICANT CHANGE UP (ref 0–0.9)
MONOCYTES NFR BLD AUTO: 5.3 % — SIGNIFICANT CHANGE UP (ref 2–14)
NEUTROPHILS # BLD AUTO: 8.69 K/UL — HIGH (ref 1.8–7.4)
NEUTROPHILS NFR BLD AUTO: 67.8 % — SIGNIFICANT CHANGE UP (ref 43–77)
PLATELET # BLD AUTO: 436 K/UL — HIGH (ref 150–400)
POTASSIUM SERPL-MCNC: 3.9 MMOL/L — SIGNIFICANT CHANGE UP (ref 3.5–5.3)
POTASSIUM SERPL-SCNC: 3.9 MMOL/L — SIGNIFICANT CHANGE UP (ref 3.5–5.3)
RBC # BLD: 3.4 M/UL — LOW (ref 3.8–5.2)
RBC # FLD: 14.8 % — HIGH (ref 10.3–14.5)
SODIUM SERPL-SCNC: 139 MMOL/L — SIGNIFICANT CHANGE UP (ref 135–145)
WBC # BLD: 12.82 K/UL — HIGH (ref 3.8–10.5)
WBC # FLD AUTO: 12.82 K/UL — HIGH (ref 3.8–10.5)

## 2024-12-25 PROCEDURE — 99232 SBSQ HOSP IP/OBS MODERATE 35: CPT

## 2024-12-25 PROCEDURE — 99233 SBSQ HOSP IP/OBS HIGH 50: CPT

## 2024-12-25 RX ADMIN — LOSARTAN POTASSIUM 100 MILLIGRAM(S): 100 TABLET, FILM COATED ORAL at 06:16

## 2024-12-25 RX ADMIN — Medication 1 TABLET(S): at 13:01

## 2024-12-25 RX ADMIN — Medication 2000 UNIT(S): at 13:02

## 2024-12-25 RX ADMIN — VANCOMYCIN HYDROCHLORIDE 125 MILLIGRAM(S): 5 INJECTION, POWDER, LYOPHILIZED, FOR SOLUTION INTRAVENOUS at 00:23

## 2024-12-25 RX ADMIN — VANCOMYCIN HYDROCHLORIDE 125 MILLIGRAM(S): 5 INJECTION, POWDER, LYOPHILIZED, FOR SOLUTION INTRAVENOUS at 13:00

## 2024-12-25 RX ADMIN — ACETAMINOPHEN 1000 MILLIGRAM(S): 80 SOLUTION/ DROPS ORAL at 21:16

## 2024-12-25 RX ADMIN — CARBIDOPA 25 MILLIGRAM(S): 25 TABLET ORAL at 13:02

## 2024-12-25 RX ADMIN — ACETAMINOPHEN 1000 MILLIGRAM(S): 80 SOLUTION/ DROPS ORAL at 22:00

## 2024-12-25 RX ADMIN — Medication 5 MILLIGRAM(S): at 13:00

## 2024-12-25 RX ADMIN — VANCOMYCIN HYDROCHLORIDE 125 MILLIGRAM(S): 5 INJECTION, POWDER, LYOPHILIZED, FOR SOLUTION INTRAVENOUS at 06:15

## 2024-12-25 RX ADMIN — LEVODOPA AND CARBIDOPA 2 CAPSULE(S): 145; 36.25 CAPSULE, EXTENDED RELEASE ORAL at 17:14

## 2024-12-25 RX ADMIN — CARBIDOPA 25 MILLIGRAM(S): 25 TABLET ORAL at 07:15

## 2024-12-25 RX ADMIN — Medication 5 MILLIGRAM(S): at 07:15

## 2024-12-25 RX ADMIN — CARBIDOPA 25 MILLIGRAM(S): 25 TABLET ORAL at 17:14

## 2024-12-25 RX ADMIN — LEVODOPA AND CARBIDOPA 2 CAPSULE(S): 145; 36.25 CAPSULE, EXTENDED RELEASE ORAL at 07:15

## 2024-12-25 RX ADMIN — VANCOMYCIN HYDROCHLORIDE 125 MILLIGRAM(S): 5 INJECTION, POWDER, LYOPHILIZED, FOR SOLUTION INTRAVENOUS at 17:13

## 2024-12-25 RX ADMIN — ENOXAPARIN SODIUM 30 MILLIGRAM(S): 60 INJECTION INTRAVENOUS; SUBCUTANEOUS at 21:22

## 2024-12-25 RX ADMIN — LEVODOPA AND CARBIDOPA 2 CAPSULE(S): 145; 36.25 CAPSULE, EXTENDED RELEASE ORAL at 13:00

## 2024-12-25 NOTE — PROGRESS NOTE ADULT - SUBJECTIVE AND OBJECTIVE BOX
OVERNIGHT EVENTS: NAEO    SUBJECTIVE / INTERVAL HPI: Patient seen and examined at bedside. Patient denying chest pain, SOB, palpitations, cough. Patient denies fever, chills, HA, Dizziness, N/V. Denies acute complaints. Diarrhea improving .  Remaining ROS negative       PHYSICAL EXAM:    GENERAL: NAD  HEAD:  Atraumatic, Normocephalic  EYES: EOMI, conjunctiva and sclera clear  ENMT: Moist mucous membranes  NECK: Supple, No JVD  NERVOUS SYSTEM:  Alert & Oriented X3, Good concentration  CHEST/LUNG: Clear to auscultation bilaterally  HEART: Regular rate and rhythm; Normal S1 and S2  ABDOMEN: Soft, Nontender, Nondistended; Bowel sounds present  EXTREMITIES:  left wrist and left hip dressing c/d/i; 2+ Peripheral Pulses  PSYCH: normal mood    VITAL SIGNS:  Vital Signs Last 24 Hrs  T(C): 36.8 (25 Dec 2024 13:00), Max: 37.6 (24 Dec 2024 21:17)  T(F): 98.3 (25 Dec 2024 13:00), Max: 99.7 (24 Dec 2024 21:17)  HR: 80 (25 Dec 2024 13:00) (78 - 97)  BP: 150/82 (25 Dec 2024 13:00) (139/72 - 153/87)  BP(mean): --  RR: 18 (25 Dec 2024 13:00) (17 - 19)  SpO2: 98% (25 Dec 2024 13:00) (93% - 98%)    Parameters below as of 25 Dec 2024 13:00  Patient On (Oxygen Delivery Method): room air          MEDICATIONS:  MEDICATIONS  (STANDING):  carbidopa 25 milliGRAM(s) Oral <User Schedule>  carbidopa 23.75 mG/levodopa 95 mG ER 2 Capsule(s) Oral <User Schedule>  cholecalciferol 2000 Unit(s) Oral daily  enoxaparin Injectable 30 milliGRAM(s) SubCutaneous every 24 hours  influenza  Vaccine (HIGH DOSE) 0.5 milliLiter(s) IntraMuscular once  lactobacillus acidophilus 1 Tablet(s) Oral daily  losartan 100 milliGRAM(s) Oral daily  multivitamin 1 Tablet(s) Oral daily  povidone iodine 10% Nasal Swab 1 Application(s) Both Nostrils once  selegiline Oral Tab/Cap 5 milliGRAM(s) Oral <User Schedule>  vancomycin    Solution 125 milliGRAM(s) Oral every 6 hours    MEDICATIONS  (PRN):  acetaminophen     Tablet .. 1000 milliGRAM(s) Oral every 8 hours PRN Mild Pain (1 - 3)  magnesium hydroxide Suspension 30 milliLiter(s) Oral daily PRN Constipation  ondansetron    Tablet 4 milliGRAM(s) Oral every 6 hours PRN Nausea and/or Vomiting  oxyCODONE    IR 5 milliGRAM(s) Oral every 6 hours PRN Moderate Pain (4 - 6)  oxyCODONE    IR 10 milliGRAM(s) Oral every 6 hours PRN Severe Pain (7 - 10)      ALLERGIES:  Allergies    penicillin (Swelling)  sulfa drugs (Nausea)  codeine (Nausea)    Intolerances        LABS:                        9.5    12.82 )-----------( 436      ( 25 Dec 2024 08:00 )             29.9     12-25    139  |  104  |  16  ----------------------------<  104[H]  3.9   |  26  |  0.89    Ca    8.1[L]      25 Dec 2024 08:00        Urinalysis Basic - ( 25 Dec 2024 08:00 )    Color: x / Appearance: x / SG: x / pH: x  Gluc: 104 mg/dL / Ketone: x  / Bili: x / Urobili: x   Blood: x / Protein: x / Nitrite: x   Leuk Esterase: x / RBC: x / WBC x   Sq Epi: x / Non Sq Epi: x / Bacteria: x      CAPILLARY BLOOD GLUCOSE          RADIOLOGY & ADDITIONAL TESTS: Reviewed.

## 2024-12-25 NOTE — PROGRESS NOTE ADULT - ASSESSMENT
Ms. Alma Osorio is a 79/F with Parkinson disease, HTN, prior left hemiarthroplasty in 2021 and a recent admission (11/25/24-11/26/24) after a mechanical fall subsequently found to have L hip periprosthetic fracture and L distal radial fracture who presented back to [facility] per outpatient surgeon request for left distal radius open reduction and internal fixation surgery. She underwent closed reduction of the left distal radius. She is now s/p periprosthetic left hip ORIF.    Recommendations:    #Left distal radius fracture  #Left hip periprosthetic fracture  #Post op status  - pain controlled with PRN Tylenol for mild pain, Oxycodone for moderate to severe pain  - Provide adequate analgesia, Incentive spirometry, mobilize with fall precautions, bowel regimen, DVT prophylaxis  - PT/OT    #C diff infection, first recurrence  - last C diff in 2021 reportedly  - ID following   - WBC 12 from 16. Tmax 98.7FF, no abdominal pain   - per guidelines options to treat first recurrence are the following:  *Fidaxomicin  200 mg orally twice daily for 10 days, OR  200 mg orally twice daily for 5 days, followed by once every other day for 20 days    *Vancomycin in a tapered and pulsed regimen, for example:  125 mg orally 4 times daily for 10 to 14 days, then  125 mg orally 2 times daily for 7 days, then  125 mg orally once daily for 7 days, then  125 mg orally every 2 to 3 days for 2 to 8 weeks  - patient currently on PO Vancomycin  - ID following  - isolation precautions per policy    #Hypokalemia, improved  - monitor  - can check Mg level     #Hyponatremia, likely SIADH 2/2 pain, improved  - fluid restriction of 1.5L/day  - pain control    #Anemia, acute on chronic iron deficiency anemia  #Thrombocytosis, possibly reactive, improved  - Tsat 16%, ferritin 252, B12 848, haptoglobin 244  - Monitor Hgb  - Transfuse for Hgb < 7  - Ensure Type and Screen available  - can offer ferrous sulfate 325 mg every other day with bowel regimen on DC    #Vit D deficiency  #Alkaline phosphatase elevation  - on Vit D supplementation  - can send GGT if AlkPhos continues to trend up; TBil normal    #HTN (hypertension)  - on Losartan 100 mg daily, ensure BP parameters  - BP monitoring    #Parkinson disease  - on selegiline 5mg BID, carbidopa levodopa 23.75-95 XR 2 tabs TID, carbidopa 25 1 tab TID, Domperidone (patient reports taking this to address the Parkinson's meds side effects, unsure if she also has gastroparesis)  - patient advised to have someone from home bring Domperidone which is NF in the hospital so she can get it inpatient if possible  - PT/OT    DVT ppx: Lovenox  Dispo: [facility]    Feel free to reach out for any questions. Recommendations discussed with primary team.

## 2024-12-25 NOTE — PROGRESS NOTE ADULT - SUBJECTIVE AND OBJECTIVE BOX
Ortho Note    Pt comfortable without complaints, pain controlled  Denies CP, SOB, N/V, numbness/tingling     Vital Signs Last 24 Hrs  T(C): 37 (12-25-24 @ 09:00), Max: 37 (12-25-24 @ 09:00)  T(F): 98.6 (12-25-24 @ 09:00), Max: 98.6 (12-25-24 @ 09:00)  HR: 82 (12-25-24 @ 09:00) (78 - 82)  BP: 152/88 (12-25-24 @ 09:00) (150/85 - 152/88)  BP(mean): --  RR: 18 (12-25-24 @ 09:00) (17 - 18)  SpO2: 95% (12-25-24 @ 09:00) (94% - 95%)  I&O's Summary    24 Dec 2024 07:01  -  25 Dec 2024 07:00  --------------------------------------------------------  IN: 0 mL / OUT: 900 mL / NET: -900 mL        General: Pt Alert and oriented, NAD  LUE:   Dressing C/D/I: sugartong splint LUE, carterblock pillow applied   Sensation: SILT  Motor: wiggle fingers    LLE:  SILT  Motor 5/5 TA/GS/EHL/FHL  Pulses 2+ marked                          9.5    12.82 )-----------( 436      ( 25 Dec 2024 08:00 )             29.9     12-25    139  |  104  |  16  ----------------------------<  104[H]  3.9   |  26  |  0.89    Ca    8.1[L]      25 Dec 2024 08:00        A/P: 79yFemale s/p Left Hip Periprosthetic ORIF by Dr. BRENDEN Loja on 12-16 and DRF ORIF 12/12  - Stable  - Pain Control  - DVT ppx: SCDs, LVX  - Post op abx: Ancef  - WBS: Protected weight bearing  LLE posterior hip precautions, NWB L UE  - C-diff postiive - started on Vancomycin  - [x]ID consult  Dispo : [facility]    Ortho Pager [phone]

## 2024-12-25 NOTE — PROGRESS NOTE ADULT - SUBJECTIVE AND OBJECTIVE BOX
INFECTIOUS DISEASES CONSULT FOLLOW-UP NOTE    INTERVAL HPI/OVERNIGHT EVENTS:  Afebrile, WBC down to 13k  No BMs/24h    ROS:   Constitutional, eyes, ENT, cardiovascular, respiratory, gastrointestinal, genitourinary, integumentary, neurological, psychiatric and heme/lymph are otherwise negative other than noted above       ANTIBIOTICS/RELEVANT:    MEDICATIONS  (STANDING):  carbidopa 25 milliGRAM(s) Oral <User Schedule>  carbidopa 23.75 mG/levodopa 95 mG ER 2 Capsule(s) Oral <User Schedule>  cholecalciferol 2000 Unit(s) Oral daily  enoxaparin Injectable 30 milliGRAM(s) SubCutaneous every 24 hours  influenza  Vaccine (HIGH DOSE) 0.5 milliLiter(s) IntraMuscular once  lactobacillus acidophilus 1 Tablet(s) Oral daily  losartan 100 milliGRAM(s) Oral daily  multivitamin 1 Tablet(s) Oral daily  povidone iodine 10% Nasal Swab 1 Application(s) Both Nostrils once  selegiline Oral Tab/Cap 5 milliGRAM(s) Oral <User Schedule>  vancomycin    Solution 125 milliGRAM(s) Oral every 6 hours    MEDICATIONS  (PRN):  acetaminophen     Tablet .. 1000 milliGRAM(s) Oral every 8 hours PRN Mild Pain (1 - 3)  magnesium hydroxide Suspension 30 milliLiter(s) Oral daily PRN Constipation  ondansetron    Tablet 4 milliGRAM(s) Oral every 6 hours PRN Nausea and/or Vomiting  oxyCODONE    IR 5 milliGRAM(s) Oral every 6 hours PRN Moderate Pain (4 - 6)  oxyCODONE    IR 10 milliGRAM(s) Oral every 6 hours PRN Severe Pain (7 - 10)        Vital Signs Last 24 Hrs  T(C): 36.4 (25 Dec 2024 18:08), Max: 37.6 (24 Dec 2024 21:17)  T(F): 97.6 (25 Dec 2024 18:08), Max: 99.7 (24 Dec 2024 21:17)  HR: 95 (25 Dec 2024 18:08) (78 - 97)  BP: 124/64 (25 Dec 2024 18:08) (124/64 - 153/87)  BP(mean): --  RR: 17 (25 Dec 2024 18:08) (17 - 19)  SpO2: 100% (25 Dec 2024 18:08) (93% - 100%)    Parameters below as of 25 Dec 2024 18:08  Patient On (Oxygen Delivery Method): room air        12-24-24 @ 07:01  -  12-25-24 @ 07:00  --------------------------------------------------------  IN: 0 mL / OUT: 900 mL / NET: -900 mL    12-25-24 @ 07:01  -  12-25-24 @ 18:14  --------------------------------------------------------  IN: 0 mL / OUT: 500 mL / NET: -500 mL      PHYSICAL EXAM:  Constitutional: alert, NAD  Pulmonary: no respiratory distress  Heart: heart rate was normal and rhythm regular  Abdomen: soft, non-tender  Neurological: no focal deficits.   Psychiatric: the affect was normal  Extremities: LUE wrapped - dressing c/d/i.         LABS:                        9.5    12.82 )-----------( 436      ( 25 Dec 2024 08:00 )             29.9     12-25    139  |  104  |  16  ----------------------------<  104[H]  3.9   |  26  |  0.89    Ca    8.1[L]      25 Dec 2024 08:00        Urinalysis Basic - ( 25 Dec 2024 08:00 )    Color: x / Appearance: x / SG: x / pH: x  Gluc: 104 mg/dL / Ketone: x  / Bili: x / Urobili: x   Blood: x / Protein: x / Nitrite: x   Leuk Esterase: x / RBC: x / WBC x   Sq Epi: x / Non Sq Epi: x / Bacteria: x        MICROBIOLOGY:  Reviewed    RADIOLOGY & ADDITIONAL STUDIES:  Reviewed

## 2024-12-25 NOTE — PROGRESS NOTE ADULT - ASSESSMENT
# 2nd episode of CDIFF - mild. Diarrhea resolved.  - Continue vancomycin 125mg PO q6h. Plan is for vanc PO pulse taper as below:        125 mG every 6 hours to complete 14 days, then        125 mG every 12 hours for 7 days, then        125 mG once per day for 7 days, then         125 mG every other day for 7 days, then        125 mG every third day for 14 days    ID Team 2 will follow. I will cover today and Dr. Rowe will resume care 12/26.

## 2024-12-26 ENCOUNTER — TRANSCRIPTION ENCOUNTER (OUTPATIENT)
Age: 79
End: 2024-12-26

## 2024-12-26 VITALS
DIASTOLIC BLOOD PRESSURE: 76 MMHG | HEART RATE: 80 BPM | SYSTOLIC BLOOD PRESSURE: 147 MMHG | TEMPERATURE: 98 F | OXYGEN SATURATION: 97 % | RESPIRATION RATE: 17 BRPM

## 2024-12-26 LAB
ANION GAP SERPL CALC-SCNC: 10 MMOL/L — SIGNIFICANT CHANGE UP (ref 5–17)
BUN SERPL-MCNC: 18 MG/DL — SIGNIFICANT CHANGE UP (ref 7–23)
CALCIUM SERPL-MCNC: 8.3 MG/DL — LOW (ref 8.4–10.5)
CHLORIDE SERPL-SCNC: 104 MMOL/L — SIGNIFICANT CHANGE UP (ref 96–108)
CO2 SERPL-SCNC: 23 MMOL/L — SIGNIFICANT CHANGE UP (ref 22–31)
CREAT SERPL-MCNC: 1.01 MG/DL — SIGNIFICANT CHANGE UP (ref 0.5–1.3)
EGFR: 57 ML/MIN/1.73M2 — LOW
GLUCOSE SERPL-MCNC: 102 MG/DL — HIGH (ref 70–99)
HCT VFR BLD CALC: 29.4 % — LOW (ref 34.5–45)
HGB BLD-MCNC: 9.5 G/DL — LOW (ref 11.5–15.5)
MCHC RBC-ENTMCNC: 29 PG — SIGNIFICANT CHANGE UP (ref 27–34)
MCHC RBC-ENTMCNC: 32.3 G/DL — SIGNIFICANT CHANGE UP (ref 32–36)
MCV RBC AUTO: 89.6 FL — SIGNIFICANT CHANGE UP (ref 80–100)
NRBC # BLD: 0 /100 WBCS — SIGNIFICANT CHANGE UP (ref 0–0)
PLATELET # BLD AUTO: 515 K/UL — HIGH (ref 150–400)
POTASSIUM SERPL-MCNC: 3.9 MMOL/L — SIGNIFICANT CHANGE UP (ref 3.5–5.3)
POTASSIUM SERPL-SCNC: 3.9 MMOL/L — SIGNIFICANT CHANGE UP (ref 3.5–5.3)
RBC # BLD: 3.28 M/UL — LOW (ref 3.8–5.2)
RBC # FLD: 14.9 % — HIGH (ref 10.3–14.5)
SODIUM SERPL-SCNC: 137 MMOL/L — SIGNIFICANT CHANGE UP (ref 135–145)
WBC # BLD: 12.99 K/UL — HIGH (ref 3.8–10.5)
WBC # FLD AUTO: 12.99 K/UL — HIGH (ref 3.8–10.5)

## 2024-12-26 PROCEDURE — 97168 OT RE-EVAL EST PLAN CARE: CPT

## 2024-12-26 PROCEDURE — 82306 VITAMIN D 25 HYDROXY: CPT

## 2024-12-26 PROCEDURE — 83550 IRON BINDING TEST: CPT

## 2024-12-26 PROCEDURE — 97161 PT EVAL LOW COMPLEX 20 MIN: CPT

## 2024-12-26 PROCEDURE — 72170 X-RAY EXAM OF PELVIS: CPT

## 2024-12-26 PROCEDURE — 86923 COMPATIBILITY TEST ELECTRIC: CPT

## 2024-12-26 PROCEDURE — 85610 PROTHROMBIN TIME: CPT

## 2024-12-26 PROCEDURE — C1713: CPT

## 2024-12-26 PROCEDURE — 85025 COMPLETE CBC W/AUTO DIFF WBC: CPT

## 2024-12-26 PROCEDURE — P9016: CPT

## 2024-12-26 PROCEDURE — 36415 COLL VENOUS BLD VENIPUNCTURE: CPT

## 2024-12-26 PROCEDURE — 82330 ASSAY OF CALCIUM: CPT

## 2024-12-26 PROCEDURE — 97164 PT RE-EVAL EST PLAN CARE: CPT

## 2024-12-26 PROCEDURE — C1889: CPT

## 2024-12-26 PROCEDURE — 83970 ASSAY OF PARATHORMONE: CPT

## 2024-12-26 PROCEDURE — 87086 URINE CULTURE/COLONY COUNT: CPT

## 2024-12-26 PROCEDURE — 86900 BLOOD TYPING SEROLOGIC ABO: CPT

## 2024-12-26 PROCEDURE — 85027 COMPLETE CBC AUTOMATED: CPT

## 2024-12-26 PROCEDURE — 84132 ASSAY OF SERUM POTASSIUM: CPT

## 2024-12-26 PROCEDURE — 99232 SBSQ HOSP IP/OBS MODERATE 35: CPT

## 2024-12-26 PROCEDURE — 97110 THERAPEUTIC EXERCISES: CPT

## 2024-12-26 PROCEDURE — 99285 EMERGENCY DEPT VISIT HI MDM: CPT | Mod: 25

## 2024-12-26 PROCEDURE — 71045 X-RAY EXAM CHEST 1 VIEW: CPT

## 2024-12-26 PROCEDURE — 83930 ASSAY OF BLOOD OSMOLALITY: CPT

## 2024-12-26 PROCEDURE — 73110 X-RAY EXAM OF WRIST: CPT

## 2024-12-26 PROCEDURE — 83010 ASSAY OF HAPTOGLOBIN QUANT: CPT

## 2024-12-26 PROCEDURE — 85730 THROMBOPLASTIN TIME PARTIAL: CPT

## 2024-12-26 PROCEDURE — 97530 THERAPEUTIC ACTIVITIES: CPT

## 2024-12-26 PROCEDURE — 80053 COMPREHEN METABOLIC PANEL: CPT

## 2024-12-26 PROCEDURE — 82607 VITAMIN B-12: CPT

## 2024-12-26 PROCEDURE — 83540 ASSAY OF IRON: CPT

## 2024-12-26 PROCEDURE — 76000 FLUOROSCOPY <1 HR PHYS/QHP: CPT

## 2024-12-26 PROCEDURE — 87186 SC STD MICRODIL/AGAR DIL: CPT

## 2024-12-26 PROCEDURE — 87449 NOS EACH ORGANISM AG IA: CPT

## 2024-12-26 PROCEDURE — 84300 ASSAY OF URINE SODIUM: CPT

## 2024-12-26 PROCEDURE — 81001 URINALYSIS AUTO W/SCOPE: CPT

## 2024-12-26 PROCEDURE — P9059: CPT

## 2024-12-26 PROCEDURE — 84295 ASSAY OF SERUM SODIUM: CPT

## 2024-12-26 PROCEDURE — 82728 ASSAY OF FERRITIN: CPT

## 2024-12-26 PROCEDURE — 83935 ASSAY OF URINE OSMOLALITY: CPT

## 2024-12-26 PROCEDURE — 87077 CULTURE AEROBIC IDENTIFY: CPT

## 2024-12-26 PROCEDURE — 36430 TRANSFUSION BLD/BLD COMPNT: CPT

## 2024-12-26 PROCEDURE — 97165 OT EVAL LOW COMPLEX 30 MIN: CPT

## 2024-12-26 PROCEDURE — 99233 SBSQ HOSP IP/OBS HIGH 50: CPT

## 2024-12-26 PROCEDURE — 82947 ASSAY GLUCOSE BLOOD QUANT: CPT

## 2024-12-26 PROCEDURE — 97116 GAIT TRAINING THERAPY: CPT

## 2024-12-26 PROCEDURE — 73502 X-RAY EXAM HIP UNI 2-3 VIEWS: CPT

## 2024-12-26 PROCEDURE — 73552 X-RAY EXAM OF FEMUR 2/>: CPT

## 2024-12-26 PROCEDURE — 85045 AUTOMATED RETICULOCYTE COUNT: CPT

## 2024-12-26 PROCEDURE — 84443 ASSAY THYROID STIM HORMONE: CPT

## 2024-12-26 PROCEDURE — 86901 BLOOD TYPING SEROLOGIC RH(D): CPT

## 2024-12-26 PROCEDURE — 87324 CLOSTRIDIUM AG IA: CPT

## 2024-12-26 PROCEDURE — 82805 BLOOD GASES W/O2 SATURATION: CPT

## 2024-12-26 PROCEDURE — 93005 ELECTROCARDIOGRAM TRACING: CPT

## 2024-12-26 PROCEDURE — 80048 BASIC METABOLIC PNL TOTAL CA: CPT

## 2024-12-26 PROCEDURE — 86850 RBC ANTIBODY SCREEN: CPT

## 2024-12-26 PROCEDURE — 82310 ASSAY OF CALCIUM: CPT

## 2024-12-26 RX ORDER — VANCOMYCIN HYDROCHLORIDE 5 G/100ML
1 INJECTION, POWDER, LYOPHILIZED, FOR SOLUTION INTRAVENOUS
Qty: 0 | Refills: 0 | DISCHARGE

## 2024-12-26 RX ORDER — ENOXAPARIN SODIUM 60 MG/.6ML
30 INJECTION INTRAVENOUS; SUBCUTANEOUS
Qty: 0 | Refills: 0 | DISCHARGE

## 2024-12-26 RX ORDER — LACTOBACILLUS ACIDOPHILUS/PECT 75 MM-100
1 CAPSULE ORAL
Qty: 0 | Refills: 0 | DISCHARGE
Start: 2024-12-26

## 2024-12-26 RX ORDER — B COMPLEX, C NO.20/FOLIC ACID 1 MG
1 CAPSULE ORAL
Qty: 0 | Refills: 0 | DISCHARGE
Start: 2024-12-26

## 2024-12-26 RX ADMIN — Medication 5 MILLIGRAM(S): at 11:56

## 2024-12-26 RX ADMIN — LOSARTAN POTASSIUM 100 MILLIGRAM(S): 100 TABLET, FILM COATED ORAL at 05:49

## 2024-12-26 RX ADMIN — Medication 5 MILLIGRAM(S): at 07:09

## 2024-12-26 RX ADMIN — VANCOMYCIN HYDROCHLORIDE 125 MILLIGRAM(S): 5 INJECTION, POWDER, LYOPHILIZED, FOR SOLUTION INTRAVENOUS at 05:49

## 2024-12-26 RX ADMIN — ACETAMINOPHEN 1000 MILLIGRAM(S): 80 SOLUTION/ DROPS ORAL at 13:03

## 2024-12-26 RX ADMIN — LEVODOPA AND CARBIDOPA 2 CAPSULE(S): 145; 36.25 CAPSULE, EXTENDED RELEASE ORAL at 07:07

## 2024-12-26 RX ADMIN — VANCOMYCIN HYDROCHLORIDE 125 MILLIGRAM(S): 5 INJECTION, POWDER, LYOPHILIZED, FOR SOLUTION INTRAVENOUS at 00:33

## 2024-12-26 RX ADMIN — CARBIDOPA 25 MILLIGRAM(S): 25 TABLET ORAL at 11:56

## 2024-12-26 RX ADMIN — LEVODOPA AND CARBIDOPA 2 CAPSULE(S): 145; 36.25 CAPSULE, EXTENDED RELEASE ORAL at 11:57

## 2024-12-26 RX ADMIN — Medication 1 TABLET(S): at 11:55

## 2024-12-26 RX ADMIN — Medication 2000 UNIT(S): at 11:55

## 2024-12-26 RX ADMIN — Medication 1 TABLET(S): at 11:56

## 2024-12-26 RX ADMIN — VANCOMYCIN HYDROCHLORIDE 125 MILLIGRAM(S): 5 INJECTION, POWDER, LYOPHILIZED, FOR SOLUTION INTRAVENOUS at 11:56

## 2024-12-26 RX ADMIN — CARBIDOPA 25 MILLIGRAM(S): 25 TABLET ORAL at 07:07

## 2024-12-26 NOTE — DISCHARGE NOTE NURSING/CASE MANAGEMENT/SOCIAL WORK - FINANCIAL ASSISTANCE
[facility] provides services at a reduced cost to those who are determined to be eligible through [facility]’s financial assistance program. Information regarding [facility]’s financial assistance program can be found by going to [email or web address] or by calling [phone].

## 2024-12-26 NOTE — DISCHARGE NOTE NURSING/CASE MANAGEMENT/SOCIAL WORK - NSDCPEFALRISK_GEN_ALL_CORE
For information on Fall & Injury Prevention, visit: [email or web address] OR  [email or web address] OR  https://www.cdc.gov/steadi/patient.html

## 2024-12-26 NOTE — PROGRESS NOTE ADULT - SUBJECTIVE AND OBJECTIVE BOX
Ortho Note    Pt seen and examined on morning rounds. Pt comfortable without complaints, pain controlled.  Denies CP, SOB, N/V, numbness/tingling     Vital Signs Last 24 Hrs  T(C): 36.6 (12-26-24 @ 05:46), Max: 36.6 (12-26-24 @ 05:46)  T(F): 97.8 (12-26-24 @ 05:46), Max: 97.8 (12-26-24 @ 05:46)  HR: 80 (12-26-24 @ 05:46) (77 - 80)  BP: 158/84 (12-26-24 @ 05:46) (158/80 - 158/84)  BP(mean): --  RR: 17 (12-26-24 @ 05:46) (17 - 18)  SpO2: 97% (12-26-24 @ 05:46) (96% - 97%)  I&O's Summary    24 Dec 2024 07:01  -  25 Dec 2024 07:00  --------------------------------------------------------  IN: 0 mL / OUT: 900 mL / NET: -900 mL    25 Dec 2024 07:01  -  26 Dec 2024 06:58  --------------------------------------------------------  IN: 0 mL / OUT: 800 mL / NET: -800 mL        Physical Exam:  General: Pt Alert and oriented, NAD  LUE:   Dressing C/D/I: sugartong splint LUchanel DUMONT pillow applied   Sensation: SILT  Motor: wiggle fingers    LLE:  SILT  Motor 5/5 TA/GS/EHL/FHL  Pulses 2+ marked                                     9.5    12.99 )-----------( 515      ( 26 Dec 2024 05:30 )             29.4     12-26    137  |  104  |  18  ----------------------------<  102[H]  3.9   |  23  |  1.01    Ca    8.3[L]      26 Dec 2024 05:30        A/P: 79yFemale s/p Left Hip Periprosthetic ORIF by Dr. BRENDEN Loja on 12-16 and REJI ORIF 12/12  - Stable  - Pain Control  - DVT ppx: SCDs, [ ]F/u when to resum Lvx  - Post op abx: Ancef  - WBS: Protected weight bearing  LLE posterior hip precautions, NWB L UE  - per pharm reglan c/i for GI motility rec erythromycin/zofran combo until patient has home domperidone   - continuing with PO vanco for C.diff   Dispo : [facility] Torres, PGY-4  Ortho Pager [phone]

## 2024-12-26 NOTE — PROGRESS NOTE ADULT - ASSESSMENT
2nd episode of C diff, diarrhea very mild, resolved. Improved leukocytosis - now mild.  Suggest:  - Would complete course of vancomycin with pulse/taper as follows:        125 mG every 6 hours to complete 14 days, then        125 mG every 12 hours for 7 days, then        125 mG once per day for 7 days, then         125 mG every other day for 7 days, then        125 mG every third day for 14 days  Please recall if further ID input is desired - team 2.

## 2024-12-26 NOTE — PROGRESS NOTE ADULT - TIME BILLING
Bedside exam and interview   Reviewed vitals, labs   Discussed patient's plan of care with housestaff   Documentation of encounter
Review of hospital course, labs, vitals, medical records.   Bedside exam and interview   Discussed plan of care with primary team ACP and housestaff   Documenting the encounter  Excludes teaching time and/or separately reported services
Managing CDI
Review of hospital course, labs, vitals, medical records.   Bedside exam and interview   Discussed plan of care with primary team ACP and housestaff   Documenting the encounter  Excludes teaching time and/or separately reported services

## 2024-12-26 NOTE — PROGRESS NOTE ADULT - SUBJECTIVE AND OBJECTIVE BOX
OVERNIGHT EVENTS: NAEO    SUBJECTIVE / INTERVAL HPI: Patient seen and examined at bedside. Patient denying chest pain, SOB, palpitations, cough. Patient denies fever, chills, HA, Dizziness, N/V, abdominal pain. Diarrhea has resolved.     Remaining ROS negative       PHYSICAL EXAM:  GENERAL: NAD  HEAD:  Atraumatic, Normocephalic  EYES: EOMI, conjunctiva and sclera clear  ENMT: Moist mucous membranes  NECK: Supple, No JVD  NERVOUS SYSTEM:  Alert & Oriented X3, Good concentration  CHEST/LUNG: Clear to auscultation bilaterally  HEART: Regular rate and rhythm; Normal S1 and S2  ABDOMEN: Soft, Nontender, Nondistended; Bowel sounds present  EXTREMITIES:  left wrist and left hip dressing c/d/i; 2+ Peripheral Pulses  PSYCH: normal mood    VITAL SIGNS:  Vital Signs Last 24 Hrs  T(C): 36.8 (26 Dec 2024 12:00), Max: 36.8 (26 Dec 2024 12:00)  T(F): 98.2 (26 Dec 2024 12:00), Max: 98.2 (26 Dec 2024 12:00)  HR: 80 (26 Dec 2024 12:00) (77 - 95)  BP: 147/76 (26 Dec 2024 12:00) (124/64 - 159/73)  BP(mean): --  RR: 17 (26 Dec 2024 12:00) (17 - 19)  SpO2: 97% (26 Dec 2024 12:00) (93% - 100%)    Parameters below as of 26 Dec 2024 12:00  Patient On (Oxygen Delivery Method): room air          MEDICATIONS:  MEDICATIONS  (STANDING):  carbidopa 25 milliGRAM(s) Oral <User Schedule>  carbidopa 23.75 mG/levodopa 95 mG ER 2 Capsule(s) Oral <User Schedule>  cholecalciferol 2000 Unit(s) Oral daily  enoxaparin Injectable 30 milliGRAM(s) SubCutaneous every 24 hours  influenza  Vaccine (HIGH DOSE) 0.5 milliLiter(s) IntraMuscular once  lactobacillus acidophilus 1 Tablet(s) Oral daily  losartan 100 milliGRAM(s) Oral daily  multivitamin 1 Tablet(s) Oral daily  povidone iodine 10% Nasal Swab 1 Application(s) Both Nostrils once  selegiline Oral Tab/Cap 5 milliGRAM(s) Oral <User Schedule>  vancomycin    Solution 125 milliGRAM(s) Oral every 6 hours    MEDICATIONS  (PRN):  acetaminophen     Tablet .. 1000 milliGRAM(s) Oral every 8 hours PRN Mild Pain (1 - 3)  magnesium hydroxide Suspension 30 milliLiter(s) Oral daily PRN Constipation  ondansetron    Tablet 4 milliGRAM(s) Oral every 6 hours PRN Nausea and/or Vomiting  oxyCODONE    IR 5 milliGRAM(s) Oral every 6 hours PRN Moderate Pain (4 - 6)  oxyCODONE    IR 10 milliGRAM(s) Oral every 6 hours PRN Severe Pain (7 - 10)      ALLERGIES:  Allergies    penicillin (Swelling)  sulfa drugs (Nausea)  codeine (Nausea)    Intolerances        LABS:                        9.5    12.99 )-----------( 515      ( 26 Dec 2024 05:30 )             29.4     12-26    137  |  104  |  18  ----------------------------<  102[H]  3.9   |  23  |  1.01    Ca    8.3[L]      26 Dec 2024 05:30        Urinalysis Basic - ( 26 Dec 2024 05:30 )    Color: x / Appearance: x / SG: x / pH: x  Gluc: 102 mg/dL / Ketone: x  / Bili: x / Urobili: x   Blood: x / Protein: x / Nitrite: x   Leuk Esterase: x / RBC: x / WBC x   Sq Epi: x / Non Sq Epi: x / Bacteria: x      CAPILLARY BLOOD GLUCOSE          RADIOLOGY & ADDITIONAL TESTS: Reviewed.

## 2024-12-26 NOTE — PROGRESS NOTE ADULT - ASSESSMENT
Ms. Alma Osorio is a 79/F with Parkinson disease, HTN, prior left hemiarthroplasty in 2021 and a recent admission (11/25/24-11/26/24) after a mechanical fall subsequently found to have L hip periprosthetic fracture and L distal radial fracture who presented back to [facility] per outpatient surgeon request for left distal radius open reduction and internal fixation surgery. She underwent closed reduction of the left distal radius. She is now s/p periprosthetic left hip ORIF.    Recommendations:    #Left distal radius fracture  #Left hip periprosthetic fracture  #Post op status  - pain controlled with PRN Tylenol for mild pain, Oxycodone for moderate to severe pain  - Provide adequate analgesia, Incentive spirometry, mobilize with fall precautions, bowel regimen, DVT prophylaxis  - PT/OT    #C diff infection, first recurrence  - last C diff in 2021 reportedly  - WBC downtrending. Tmax 98.7FF, no abdominal pain   - per guidelines options to treat first recurrence are the following:  *Fidaxomicin  200 mg orally twice daily for 10 days, OR  200 mg orally twice daily for 5 days, followed by once every other day for 20 days    *Vancomycin in a tapered and pulsed regimen, for example:  125 mg orally 4 times daily for 10 to 14 days, then  125 mg orally 2 times daily for 7 days, then  125 mg orally once daily for 7 days, then  125 mg orally every 2 to 3 days for 2 to 8 weeks  - patient currently on PO Vancomycin  - ID following  - isolation precautions per policy    #Hypokalemia, improved  - monitor  - can check Mg level     #Hyponatremia, likely SIADH 2/2 pain, improved  - fluid restriction of 1.5L/day  - pain control    #Anemia, acute on chronic iron deficiency anemia  #Thrombocytosis, possibly reactive, improved  - Tsat 16%, ferritin 252, B12 848, haptoglobin 244  - Monitor Hgb  - Transfuse for Hgb < 7  - Ensure Type and Screen available  - can offer ferrous sulfate 325 mg every other day with bowel regimen on DC    #Vit D deficiency  #Alkaline phosphatase elevation  - on Vit D supplementation  - can send GGT if AlkPhos continues to trend up; TBil normal    #HTN (hypertension)  - on Losartan 100 mg daily, ensure BP parameters  - BP monitoring    #Parkinson disease  - on selegiline 5mg BID, carbidopa levodopa 23.75-95 XR 2 tabs TID, carbidopa 25 1 tab TID, Domperidone (patient reports taking this to address the Parkinson's meds side effects, unsure if she also has gastroparesis)  - patient advised to have someone from home bring Domperidone which is NF in the hospital so she can get it inpatient if possible  - PT/OT    DVT ppx: Lovenox  Dispo: [facility]    Feel free to reach out for any questions. Recommendations discussed with primary team.

## 2024-12-26 NOTE — PROGRESS NOTE ADULT - NUTRITIONAL ASSESSMENT
This patient has been assessed with a concern for Malnutrition and has been determined to have a diagnosis/diagnoses of Mild protein-calorie malnutrition.    This patient is being managed with:   Diet Regular-  1200mL Fluid Restriction ([id number])  Supplement Feeding Modality:  Oral  Ensure Plus High Protein Cans or Servings Per Day:  1       Frequency:  Daily  Entered: Dec 17 2024  2:47PM  
This patient has been assessed with a concern for Malnutrition and has been determined to have a diagnosis/diagnoses of Mild protein-calorie malnutrition.    This patient is being managed with:   Diet Regular-  1200mL Fluid Restriction ([id number])  Supplement Feeding Modality:  Oral  Ensure Plus High Protein Cans or Servings Per Day:  1       Frequency:  Daily  Entered: Dec 18 2024 10:40AM    This patient has been assessed with a concern for Malnutrition and has been determined to have a diagnosis/diagnoses of Mild protein-calorie malnutrition.    This patient is being managed with:   Diet Regular-  1200mL Fluid Restriction ([id number])  Supplement Feeding Modality:  Oral  Ensure Plus High Protein Cans or Servings Per Day:  1       Frequency:  Daily  Entered: Dec 18 2024 10:40AM  
This patient has been assessed with a concern for Malnutrition and has been determined to have a diagnosis/diagnoses of Mild protein-calorie malnutrition.    This patient is being managed with:   Diet Regular-  1200mL Fluid Restriction ([id number])  Supplement Feeding Modality:  Oral  Ensure Plus High Protein Cans or Servings Per Day:  1       Frequency:  Daily  Entered: Dec 18 2024 10:40AM  
This patient has been assessed with a concern for Malnutrition and has been determined to have a diagnosis/diagnoses of Mild protein-calorie malnutrition.    This patient is being managed with:   Diet Regular-  1200mL Fluid Restriction ([id number])  Supplement Feeding Modality:  Oral  Ensure Plus High Protein Cans or Servings Per Day:  1       Frequency:  Daily  Entered: Dec 18 2024 10:40AM  
This patient has been assessed with a concern for Malnutrition and has been determined to have a diagnosis/diagnoses of Mild protein-calorie malnutrition.    This patient is being managed with:   Diet Regular-  Supplement Feeding Modality:  Oral  Ensure Plus High Protein Cans or Servings Per Day:  1       Frequency:  Daily  Entered: Dec 19 2024  4:16PM  

## 2024-12-26 NOTE — PROGRESS NOTE ADULT - SUBJECTIVE AND OBJECTIVE BOX
INTERVAL HPI/OVERNIGHT EVENTS:  Afebrile, feels ~well.  2/10 pain L hip after PT, now improved.  No LUE pain    CONSTITUTIONAL:  No fever, chills, night sweats  EYES:  No photophobia or visual changes  CARDIOVASCULAR:  No chest pain  RESPIRATORY:  No cough, wheezing, or SOB   GASTROINTESTINAL:  No nausea, vomiting, diarrhea, constipation, or abdominal pain  GENITOURINARY:  No frequency, urgency, dysuria or hematuria  NEUROLOGIC:  No headache, lightheadedness      ANTIBIOTICS/RELEVANT:          Vital Signs Last 24 Hrs  T(C): 36.8 (26 Dec 2024 12:00), Max: 36.8 (26 Dec 2024 12:00)  T(F): 98.2 (26 Dec 2024 12:00), Max: 98.2 (26 Dec 2024 12:00)  HR: 80 (26 Dec 2024 12:00) (77 - 90)  BP: 147/76 (26 Dec 2024 12:00) (146/90 - 159/73)  BP(mean): --  RR: 17 (26 Dec 2024 12:00) (17 - 19)  SpO2: 97% (26 Dec 2024 12:00) (93% - 97%)    Parameters below as of 26 Dec 2024 12:00  Patient On (Oxygen Delivery Method): room air        PHYSICAL EXAM:  Constitutional:  Alert  HEENT:  NC, Sclerae anicteric, conjunctivae clear, PERRL.  No nasal exudate or sinus tenderness;  No buccal mucosal lesions, no pharyngeal erythema or exudate	  Neck:  Supple, no adenopathy  Respiratory:  Clear bilaterally  Cardiovascular:  RRR, S1S2, no murmur appreciated  Gastrointestinal:  Symmetric, normoactive BS, soft, NT, no masses, guarding or rebound.  No HSM  Extremities:  No edema.  LUE in splint, L hip incision dressed      LABS:                        9.5    12.99 )-----------( 515      ( 26 Dec 2024 05:30 )             29.4         12-26    137  |  104  |  18  ----------------------------<  102[H]  3.9   |  23  |  1.01    Ca    8.3[L]      26 Dec 2024 05:30        Urinalysis Basic - ( 26 Dec 2024 05:30 )    Color: x / Appearance: x / SG: x / pH: x  Gluc: 102 mg/dL / Ketone: x  / Bili: x / Urobili: x   Blood: x / Protein: x / Nitrite: x   Leuk Esterase: x / RBC: x / WBC x   Sq Epi: x / Non Sq Epi: x / Bacteria: x        MICROBIOLOGY:        RADIOLOGY & ADDITIONAL STUDIES:

## 2024-12-26 NOTE — DISCHARGE NOTE NURSING/CASE MANAGEMENT/SOCIAL WORK - PATIENT PORTAL LINK FT
You can access the FollowMyHealth Patient Portal offered by [facility] by registering at the following website: [email or web address]. By joining [facility]’s FollowMyHealth portal, you will also be able to view your health information using other applications (apps) compatible with our system.

## 2024-12-26 NOTE — PROGRESS NOTE ADULT - PROVIDER SPECIALTY LIST ADULT
Hospitalist
Internal Medicine
Orthopedics
Hospitalist
Hospitalist
Infectious Disease
Internal Medicine
Orthopedics
Hospitalist
Hospitalist
Infectious Disease
Orthopedics
Hospitalist
Hospitalist
Infectious Disease
Internal Medicine
Hospitalist

## 2025-01-10 DIAGNOSIS — S72.142A DISPLACED INTERTROCHANTERIC FRACTURE OF LEFT FEMUR, INITIAL ENCOUNTER FOR CLOSED FRACTURE: ICD-10-CM

## 2025-01-10 DIAGNOSIS — L98.499 NON-PRESSURE CHRONIC ULCER OF SKIN OF OTHER SITES WITH UNSPECIFIED SEVERITY: ICD-10-CM
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
## 2025-01-10 DIAGNOSIS — Z88.2 ALLERGY STATUS TO SULFONAMIDES: ICD-10-CM
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
## 2025-01-10 DIAGNOSIS — R00.0 TACHYCARDIA, UNSPECIFIED: ICD-10-CM

## 2025-01-10 DIAGNOSIS — D75.839 THROMBOCYTOSIS, UNSPECIFIED: ICD-10-CM

## 2025-01-10 DIAGNOSIS — Z53.09 PROCEDURE AND TREATMENT NOT CARRIED OUT BECAUSE OF OTHER CONTRAINDICATION: ICD-10-CM

## 2025-01-10 DIAGNOSIS — A41.9 SEPSIS, UNSPECIFIED ORGANISM: ICD-10-CM

## 2025-01-10 DIAGNOSIS — M81.0 AGE-RELATED OSTEOPOROSIS WITHOUT CURRENT PATHOLOGICAL FRACTURE: ICD-10-CM

## 2025-01-10 DIAGNOSIS — Z79.899 OTHER LONG TERM (CURRENT) DRUG THERAPY: ICD-10-CM

## 2025-01-10 DIAGNOSIS — Z88.5 ALLERGY STATUS TO NARCOTIC AGENT: ICD-10-CM

## 2025-01-10 DIAGNOSIS — R71.8 OTHER ABNORMALITY OF RED BLOOD CELLS: ICD-10-CM

## 2025-01-10 DIAGNOSIS — E55.9 VITAMIN D DEFICIENCY, UNSPECIFIED: ICD-10-CM

## 2025-01-10 DIAGNOSIS — M97.02XA PERIPROSTHETIC FRACTURE AROUND INTERNAL PROSTHETIC LEFT HIP JOINT, INITIAL ENCOUNTER: ICD-10-CM

## 2025-01-10 DIAGNOSIS — R74.8 ABNORMAL LEVELS OF OTHER SERUM ENZYMES: ICD-10-CM

## 2025-01-10 DIAGNOSIS — Z96.642 PRESENCE OF LEFT ARTIFICIAL HIP JOINT: ICD-10-CM

## 2025-01-10 DIAGNOSIS — E78.6 LIPOPROTEIN DEFICIENCY: ICD-10-CM

## 2025-01-10 DIAGNOSIS — E86.9 VOLUME DEPLETION, UNSPECIFIED: ICD-10-CM

## 2025-01-10 DIAGNOSIS — S52.502A UNSPECIFIED FRACTURE OF THE LOWER END OF LEFT RADIUS, INITIAL ENCOUNTER FOR CLOSED FRACTURE: ICD-10-CM

## 2025-01-10 DIAGNOSIS — E87.6 HYPOKALEMIA: ICD-10-CM

## 2025-01-10 DIAGNOSIS — E22.2 SYNDROME OF INAPPROPRIATE SECRETION OF ANTIDIURETIC HORMONE: ICD-10-CM

## 2025-01-10 DIAGNOSIS — G20.A1 PARKINSON'S DISEASE WITHOUT DYSKINESIA, WITHOUT MENTION OF FLUCTUATIONS: ICD-10-CM

## 2025-01-10 DIAGNOSIS — E83.51 HYPOCALCEMIA: ICD-10-CM

## 2025-01-10 DIAGNOSIS — Z88.0 ALLERGY STATUS TO PENICILLIN: ICD-10-CM

## 2025-01-10 DIAGNOSIS — D50.9 IRON DEFICIENCY ANEMIA, UNSPECIFIED: ICD-10-CM

## 2025-01-10 DIAGNOSIS — S72.22XA DISPLACED SUBTROCHANTERIC FRACTURE OF LEFT FEMUR, INITIAL ENCOUNTER FOR CLOSED FRACTURE: ICD-10-CM

## 2025-01-10 DIAGNOSIS — I10 ESSENTIAL (PRIMARY) HYPERTENSION: ICD-10-CM

## 2025-01-10 DIAGNOSIS — A04.71 ENTEROCOLITIS DUE TO CLOSTRIDIUM DIFFICILE, RECURRENT: ICD-10-CM

## (undated) DEVICE — SUT HEWSON RETRIEVER

## (undated) DEVICE — DRSG COBAN 6"

## (undated) DEVICE — SUT VICRYL 1 36" CT-1 UNDYED

## (undated) DEVICE — SUT STRATAFIX SPIRAL MONOCRYL PLUS 3-0 30CM PS-1 UNDYED

## (undated) DEVICE — SUT ETHIBOND 1 30" CT-1

## (undated) DEVICE — DRAPE LIGHT HANDLE COVER (BLUE)

## (undated) DEVICE — HOOD T5 PEELAWAY

## (undated) DEVICE — DRSG STOCKINETTE IMPERVIOUS XL 12 X 48"

## (undated) DEVICE — SUT STRATAFIX SPIRAL PDS PLUS 2-0 24X24CM CT-1 VIOLET

## (undated) DEVICE — SAW BLADE STRYKER SAGITTAL 25X86.5X1.32MM

## (undated) DEVICE — GOWN XXXL

## (undated) DEVICE — GLV 8.5 PROTEXIS (WHITE)

## (undated) DEVICE — PACK TOTAL HIP